# Patient Record
Sex: FEMALE | Race: WHITE | Employment: FULL TIME | URBAN - NONMETROPOLITAN AREA
[De-identification: names, ages, dates, MRNs, and addresses within clinical notes are randomized per-mention and may not be internally consistent; named-entity substitution may affect disease eponyms.]

---

## 2017-05-18 ENCOUNTER — TELEPHONE (OUTPATIENT)
Dept: NEUROLOGY | Age: 27
End: 2017-05-18

## 2017-05-18 ENCOUNTER — OFFICE VISIT (OUTPATIENT)
Dept: NEUROLOGY | Age: 27
End: 2017-05-18
Payer: COMMERCIAL

## 2017-05-18 VITALS
WEIGHT: 184 LBS | DIASTOLIC BLOOD PRESSURE: 68 MMHG | HEART RATE: 77 BPM | BODY MASS INDEX: 29.57 KG/M2 | OXYGEN SATURATION: 96 % | HEIGHT: 66 IN | SYSTOLIC BLOOD PRESSURE: 103 MMHG

## 2017-05-18 DIAGNOSIS — G44.031 INTRACTABLE EPISODIC PAROXYSMAL HEMICRANIA: Primary | ICD-10-CM

## 2017-05-18 DIAGNOSIS — R90.89 ABNORMAL BRAIN MRI: ICD-10-CM

## 2017-05-18 DIAGNOSIS — G43.019 INTRACTABLE MIGRAINE WITHOUT AURA AND WITHOUT STATUS MIGRAINOSUS: ICD-10-CM

## 2017-05-18 DIAGNOSIS — Z82.49 FAMILY HISTORY OF BRAIN ANEURYSM: ICD-10-CM

## 2017-05-18 PROCEDURE — 99203 OFFICE O/P NEW LOW 30 MIN: CPT | Performed by: PHYSICIAN ASSISTANT

## 2017-05-18 RX ORDER — HYDROXYZINE HYDROCHLORIDE 25 MG/1
TABLET, FILM COATED ORAL
Refills: 3 | COMMUNITY
Start: 2017-04-19 | End: 2019-09-19

## 2017-05-18 RX ORDER — SERTRALINE HYDROCHLORIDE 25 MG/1
TABLET, FILM COATED ORAL
Refills: 6 | COMMUNITY
Start: 2017-05-10 | End: 2017-10-09 | Stop reason: ALTCHOICE

## 2017-06-05 ENCOUNTER — HOSPITAL ENCOUNTER (OUTPATIENT)
Dept: MRI IMAGING | Age: 27
Discharge: HOME | End: 2017-06-05
Payer: COMMERCIAL

## 2017-06-05 DIAGNOSIS — R90.89 ABNORMAL BRAIN MRI: ICD-10-CM

## 2017-06-05 DIAGNOSIS — G44.031 EPISODIC PAROXYSMAL HEMICRANIA, INTRACTABLE: ICD-10-CM

## 2017-06-05 DIAGNOSIS — G43.019 INTRACTABLE MIGRAINE WITHOUT AURA AND WITHOUT STATUS MIGRAINOSUS: ICD-10-CM

## 2017-06-05 DIAGNOSIS — G44.031 INTRACTABLE EPISODIC PAROXYSMAL HEMICRANIA: ICD-10-CM

## 2017-06-05 DIAGNOSIS — G43.019 INTRACTABLE COMMON MIGRAINE: ICD-10-CM

## 2017-06-05 PROCEDURE — 70544 MR ANGIOGRAPHY HEAD W/O DYE: CPT

## 2017-06-05 PROCEDURE — 70547 MR ANGIOGRAPHY NECK W/O DYE: CPT

## 2017-06-09 ENCOUNTER — TELEPHONE (OUTPATIENT)
Dept: NEUROLOGY | Age: 27
End: 2017-06-09

## 2017-06-13 RX ORDER — TOPIRAMATE 100 MG/1
TABLET, FILM COATED ORAL
Qty: 30 TABLET | Refills: 3 | Status: SHIPPED | OUTPATIENT
Start: 2017-06-13 | End: 2018-01-09 | Stop reason: SDUPTHER

## 2017-06-13 RX ORDER — TOPIRAMATE 25 MG/1
TABLET ORAL
Qty: 42 TABLET | Refills: 0 | Status: SHIPPED | OUTPATIENT
Start: 2017-06-13 | End: 2017-10-09 | Stop reason: ALTCHOICE

## 2017-06-28 ENCOUNTER — TELEPHONE (OUTPATIENT)
Dept: NEUROLOGY | Age: 27
End: 2017-06-28

## 2017-08-02 ENCOUNTER — TELEPHONE (OUTPATIENT)
Dept: NEUROLOGY | Age: 27
End: 2017-08-02

## 2017-10-09 ENCOUNTER — OFFICE VISIT (OUTPATIENT)
Dept: NEUROLOGY | Age: 27
End: 2017-10-09
Payer: COMMERCIAL

## 2017-10-09 VITALS
SYSTOLIC BLOOD PRESSURE: 106 MMHG | OXYGEN SATURATION: 96 % | HEART RATE: 92 BPM | WEIGHT: 170 LBS | BODY MASS INDEX: 27.44 KG/M2 | DIASTOLIC BLOOD PRESSURE: 72 MMHG

## 2017-10-09 DIAGNOSIS — G43.019 INTRACTABLE MIGRAINE WITHOUT AURA AND WITHOUT STATUS MIGRAINOSUS: Primary | ICD-10-CM

## 2017-10-09 PROBLEM — Z82.49 FAMILY HISTORY OF BRAIN ANEURYSM: Status: RESOLVED | Noted: 2017-05-18 | Resolved: 2017-10-09

## 2017-10-09 PROBLEM — R90.89 ABNORMAL BRAIN MRI: Status: RESOLVED | Noted: 2017-05-18 | Resolved: 2017-10-09

## 2017-10-09 PROCEDURE — 99213 OFFICE O/P EST LOW 20 MIN: CPT | Performed by: PSYCHIATRY & NEUROLOGY

## 2017-10-09 RX ORDER — SUMATRIPTAN 100 MG/1
100 TABLET, FILM COATED ORAL
Qty: 9 TABLET | Refills: 3 | Status: SHIPPED | OUTPATIENT
Start: 2017-10-09 | End: 2018-05-03 | Stop reason: SDUPTHER

## 2017-10-09 NOTE — PROGRESS NOTES
multiple rotating MIP   reformatted images. Findings: The exam is markedly motion degraded. There is no occlusion of the anterior circulation. No gross high-grade   stenotic lesion of the proximal MCA or WILLIAM is evident. Likely artifact   generates a perceived mild/moderate stenosis in the right A1 segment   WILLIAM. Probable artifact also generates a perceived mild/moderate   stenosis of the right MCA origin. A patent anterior communicating   artery is present. Vertebrobasilar system is patent with no gross high-grade stenosis   allowing for artifact. Both proximal posterior cerebral arteries are   patent. There is a prominent right posterior communicating artery with   hypoplastic P1 right PCA. No patent left posterior communicating   artery is evident. No patent aneurysm is identified although sensitivity is lowered given   degree of motion degradation. Impression   Motion degraded exam. Perceived stenoses of the anterior circulation   are likely artifactual particularly given patient age. No definite   aneurysm. Signed by Dr Raul Akers on 6/5/2017 10:43 AM     Narrative   History:   18-year-old female evaluated for migraines and dizziness. Reference    None available. Technique   Routine nonenhanced cervical MRA with multiple rotating MIP   reformatted images. Findings:   Both common carotid arteries and extracranial internal carotid   arteries demonstrate normal flow signal characteristics. Both   vertebral arteries are unremarkable with antegrade flow. Impression   Negative cervical MRA. Signed by Dr Raul Akers on 6/5/2017 11:10 AM     MRI head [facility] 4/20/2017 was normal.    Assessment:       ICD-10-CM ICD-9-CM    1. Intractable migraine without aura and without status migrainosus G43.019 346.11    Improved with topamax    Plan:   1. Continue the Topamax 100 mg at bedtime  2. PRN Imitrex  3. FU in 6 months.     Electronically signed by Cecil Russell MD on 10/9/2017

## 2018-01-10 RX ORDER — TOPIRAMATE 100 MG/1
TABLET, FILM COATED ORAL
Qty: 30 TABLET | Refills: 3 | Status: SHIPPED | OUTPATIENT
Start: 2018-01-10 | End: 2018-05-03 | Stop reason: SDUPTHER

## 2018-05-03 ENCOUNTER — OFFICE VISIT (OUTPATIENT)
Dept: NEUROLOGY | Age: 28
End: 2018-05-03
Payer: COMMERCIAL

## 2018-05-03 VITALS
SYSTOLIC BLOOD PRESSURE: 106 MMHG | BODY MASS INDEX: 30.37 KG/M2 | OXYGEN SATURATION: 98 % | DIASTOLIC BLOOD PRESSURE: 65 MMHG | HEIGHT: 66 IN | WEIGHT: 189 LBS | HEART RATE: 80 BPM

## 2018-05-03 DIAGNOSIS — G44.041 CHRONIC PAROXYSMAL HEMICRANIA, INTRACTABLE: ICD-10-CM

## 2018-05-03 DIAGNOSIS — G43.019 INTRACTABLE MIGRAINE WITHOUT AURA AND WITHOUT STATUS MIGRAINOSUS: Primary | ICD-10-CM

## 2018-05-03 PROCEDURE — 99213 OFFICE O/P EST LOW 20 MIN: CPT | Performed by: PSYCHIATRY & NEUROLOGY

## 2018-05-03 RX ORDER — LAMOTRIGINE 25 MG/1
50 TABLET ORAL NIGHTLY
Refills: 3 | COMMUNITY
Start: 2018-04-14 | End: 2019-07-29

## 2018-05-03 RX ORDER — AMITRIPTYLINE HYDROCHLORIDE 25 MG/1
50 TABLET, FILM COATED ORAL NIGHTLY
Qty: 60 TABLET | Refills: 5 | Status: SHIPPED | OUTPATIENT
Start: 2018-05-03 | End: 2018-11-19 | Stop reason: CLARIF

## 2018-05-03 RX ORDER — NAPROXEN 500 MG/1
500 TABLET ORAL DAILY PRN
Refills: 2 | COMMUNITY
Start: 2018-02-22 | End: 2020-11-23

## 2018-05-03 RX ORDER — TOPIRAMATE 100 MG/1
TABLET, FILM COATED ORAL
Qty: 30 TABLET | Refills: 5 | Status: SHIPPED | OUTPATIENT
Start: 2018-05-03 | End: 2018-11-19 | Stop reason: SDUPTHER

## 2018-11-19 ENCOUNTER — OFFICE VISIT (OUTPATIENT)
Dept: NEUROLOGY | Age: 28
End: 2018-11-19
Payer: COMMERCIAL

## 2018-11-19 VITALS
HEIGHT: 66 IN | DIASTOLIC BLOOD PRESSURE: 68 MMHG | HEART RATE: 82 BPM | WEIGHT: 185 LBS | SYSTOLIC BLOOD PRESSURE: 103 MMHG | RESPIRATION RATE: 16 BRPM | BODY MASS INDEX: 29.73 KG/M2

## 2018-11-19 DIAGNOSIS — G44.221 CHRONIC TENSION-TYPE HEADACHE, INTRACTABLE: ICD-10-CM

## 2018-11-19 DIAGNOSIS — G44.041 CHRONIC PAROXYSMAL HEMICRANIA, INTRACTABLE: Primary | ICD-10-CM

## 2018-11-19 PROCEDURE — 99213 OFFICE O/P EST LOW 20 MIN: CPT | Performed by: PSYCHIATRY & NEUROLOGY

## 2018-11-19 RX ORDER — TOPIRAMATE 50 MG/1
150 TABLET, FILM COATED ORAL NIGHTLY
Qty: 90 TABLET | Refills: 5 | Status: SHIPPED | OUTPATIENT
Start: 2018-11-19 | End: 2019-12-16

## 2018-11-26 ENCOUNTER — TELEPHONE (OUTPATIENT)
Dept: NEUROLOGY | Age: 28
End: 2018-11-26

## 2019-05-11 ENCOUNTER — APPOINTMENT (OUTPATIENT)
Dept: GENERAL RADIOLOGY | Age: 29
End: 2019-05-11
Payer: COMMERCIAL

## 2019-05-11 ENCOUNTER — HOSPITAL ENCOUNTER (EMERGENCY)
Age: 29
Discharge: HOME | End: 2019-05-11
Payer: COMMERCIAL

## 2019-05-11 ENCOUNTER — APPOINTMENT (OUTPATIENT)
Dept: CT IMAGING | Age: 29
End: 2019-05-11
Payer: COMMERCIAL

## 2019-05-11 VITALS
OXYGEN SATURATION: 96 % | DIASTOLIC BLOOD PRESSURE: 70 MMHG | HEART RATE: 78 BPM | SYSTOLIC BLOOD PRESSURE: 114 MMHG | BODY MASS INDEX: 29.89 KG/M2 | RESPIRATION RATE: 18 BRPM | HEIGHT: 66 IN | TEMPERATURE: 97.7 F | WEIGHT: 186 LBS

## 2019-05-11 DIAGNOSIS — N83.202 CYST OF LEFT OVARY: ICD-10-CM

## 2019-05-11 DIAGNOSIS — R05.9 COUGH: ICD-10-CM

## 2019-05-11 DIAGNOSIS — R10.9 FLANK PAIN: ICD-10-CM

## 2019-05-11 DIAGNOSIS — J40 BRONCHITIS: Primary | ICD-10-CM

## 2019-05-11 LAB
ALBUMIN SERPL-MCNC: 4.3 G/DL (ref 3.5–5.2)
ALP BLD-CCNC: 83 U/L (ref 35–104)
ALT SERPL-CCNC: 18 U/L (ref 5–33)
ANION GAP SERPL CALCULATED.3IONS-SCNC: 12 MMOL/L (ref 7–19)
AST SERPL-CCNC: 17 U/L (ref 5–32)
BASOPHILS ABSOLUTE: 0 K/UL (ref 0–0.2)
BASOPHILS RELATIVE PERCENT: 0.3 % (ref 0–1)
BILIRUB SERPL-MCNC: <0.2 MG/DL (ref 0.2–1.2)
BILIRUBIN URINE: NEGATIVE
BLOOD, URINE: NEGATIVE
BUN BLDV-MCNC: 10 MG/DL (ref 6–20)
CALCIUM SERPL-MCNC: 9.4 MG/DL (ref 8.6–10)
CHLORIDE BLD-SCNC: 103 MMOL/L (ref 98–111)
CLARITY: ABNORMAL
CO2: 24 MMOL/L (ref 22–29)
COLOR: YELLOW
CREAT SERPL-MCNC: 0.6 MG/DL (ref 0.5–0.9)
EOSINOPHILS ABSOLUTE: 0.2 K/UL (ref 0–0.6)
EOSINOPHILS RELATIVE PERCENT: 2.3 % (ref 0–5)
GFR NON-AFRICAN AMERICAN: >60
GLUCOSE BLD-MCNC: 154 MG/DL (ref 74–109)
GLUCOSE URINE: NEGATIVE MG/DL
HCG QUALITATIVE: NEGATIVE
HCT VFR BLD CALC: 39.6 % (ref 37–47)
HEMOGLOBIN: 12.9 G/DL (ref 12–16)
KETONES, URINE: NEGATIVE MG/DL
LACTIC ACID: 1.6 MMOL/L (ref 0.5–1.9)
LEUKOCYTE ESTERASE, URINE: NEGATIVE
LIPASE: 26 U/L (ref 13–60)
LYMPHOCYTES ABSOLUTE: 1.4 K/UL (ref 1.1–4.5)
LYMPHOCYTES RELATIVE PERCENT: 15.1 % (ref 20–40)
MCH RBC QN AUTO: 29.9 PG (ref 27–31)
MCHC RBC AUTO-ENTMCNC: 32.6 G/DL (ref 33–37)
MCV RBC AUTO: 91.9 FL (ref 81–99)
MONOCYTES ABSOLUTE: 0.3 K/UL (ref 0–0.9)
MONOCYTES RELATIVE PERCENT: 3.4 % (ref 0–10)
NEUTROPHILS ABSOLUTE: 7.5 K/UL (ref 1.5–7.5)
NEUTROPHILS RELATIVE PERCENT: 78.6 % (ref 50–65)
NITRITE, URINE: NEGATIVE
PDW BLD-RTO: 13.6 % (ref 11.5–14.5)
PH UA: 6 (ref 5–8)
PLATELET # BLD: 220 K/UL (ref 130–400)
PMV BLD AUTO: 11.6 FL (ref 9.4–12.3)
POTASSIUM SERPL-SCNC: 3.8 MMOL/L (ref 3.5–5)
PROTEIN UA: NEGATIVE MG/DL
RBC # BLD: 4.31 M/UL (ref 4.2–5.4)
SODIUM BLD-SCNC: 139 MMOL/L (ref 136–145)
SPECIFIC GRAVITY UA: 1.03 (ref 1–1.03)
TOTAL PROTEIN: 7.3 G/DL (ref 6.6–8.7)
URINE REFLEX TO CULTURE: ABNORMAL
UROBILINOGEN, URINE: 0.2 E.U./DL
WBC # BLD: 9.5 K/UL (ref 4.8–10.8)

## 2019-05-11 PROCEDURE — 74150 CT ABDOMEN W/O CONTRAST: CPT

## 2019-05-11 PROCEDURE — 6360000002 HC RX W HCPCS: Performed by: NURSE PRACTITIONER

## 2019-05-11 PROCEDURE — 84703 CHORIONIC GONADOTROPIN ASSAY: CPT

## 2019-05-11 PROCEDURE — 81003 URINALYSIS AUTO W/O SCOPE: CPT

## 2019-05-11 PROCEDURE — 99284 EMERGENCY DEPT VISIT MOD MDM: CPT

## 2019-05-11 PROCEDURE — 96374 THER/PROPH/DIAG INJ IV PUSH: CPT

## 2019-05-11 PROCEDURE — 96375 TX/PRO/DX INJ NEW DRUG ADDON: CPT

## 2019-05-11 PROCEDURE — 85025 COMPLETE CBC W/AUTO DIFF WBC: CPT

## 2019-05-11 PROCEDURE — 80053 COMPREHEN METABOLIC PANEL: CPT

## 2019-05-11 PROCEDURE — 2580000003 HC RX 258: Performed by: NURSE PRACTITIONER

## 2019-05-11 PROCEDURE — 99285 EMERGENCY DEPT VISIT HI MDM: CPT | Performed by: NURSE PRACTITIONER

## 2019-05-11 PROCEDURE — 83605 ASSAY OF LACTIC ACID: CPT

## 2019-05-11 PROCEDURE — 83690 ASSAY OF LIPASE: CPT

## 2019-05-11 PROCEDURE — 36415 COLL VENOUS BLD VENIPUNCTURE: CPT

## 2019-05-11 PROCEDURE — 71046 X-RAY EXAM CHEST 2 VIEWS: CPT

## 2019-05-11 RX ORDER — PROMETHAZINE HYDROCHLORIDE 25 MG/ML
6.25 INJECTION, SOLUTION INTRAMUSCULAR; INTRAVENOUS ONCE
Status: COMPLETED | OUTPATIENT
Start: 2019-05-11 | End: 2019-05-11

## 2019-05-11 RX ORDER — 0.9 % SODIUM CHLORIDE 0.9 %
1000 INTRAVENOUS SOLUTION INTRAVENOUS ONCE
Status: COMPLETED | OUTPATIENT
Start: 2019-05-11 | End: 2019-05-11

## 2019-05-11 RX ORDER — DEXTROMETHORPHAN HYDROBROMIDE AND PROMETHAZINE HYDROCHLORIDE 15; 6.25 MG/5ML; MG/5ML
5 SYRUP ORAL 4 TIMES DAILY PRN
Qty: 118 ML | Refills: 0 | Status: SHIPPED | OUTPATIENT
Start: 2019-05-11 | End: 2019-05-18

## 2019-05-11 RX ADMIN — HYDROMORPHONE HYDROCHLORIDE 1 MG: 1 INJECTION, SOLUTION INTRAMUSCULAR; INTRAVENOUS; SUBCUTANEOUS at 09:14

## 2019-05-11 RX ADMIN — SODIUM CHLORIDE 1000 ML: 9 INJECTION, SOLUTION INTRAVENOUS at 09:14

## 2019-05-11 RX ADMIN — PROMETHAZINE HYDROCHLORIDE 6.25 MG: 25 INJECTION INTRAMUSCULAR; INTRAVENOUS at 09:14

## 2019-05-11 ASSESSMENT — ENCOUNTER SYMPTOMS
COUGH: 1
ABDOMINAL PAIN: 1
VOMITING: 1
NAUSEA: 1

## 2019-05-11 ASSESSMENT — PAIN DESCRIPTION - LOCATION: LOCATION: ABDOMEN

## 2019-05-11 ASSESSMENT — PAIN SCALES - GENERAL: PAINLEVEL_OUTOF10: 6

## 2019-05-11 NOTE — ED PROVIDER NOTES
file     Relationship status: Not on file    Intimate partner violence:     Fear of current or ex partner: Not on file     Emotionally abused: Not on file     Physically abused: Not on file     Forced sexual activity: Not on file   Other Topics Concern    Not on file   Social History Narrative    Not on file       SCREENINGS             PHYSICAL EXAM    (up to 7 for level 4, 8 or more for level 5)     ED Triage Vitals   BP Temp Temp src Pulse Resp SpO2 Height Weight   -- -- -- -- -- -- -- --     Vitals:    05/11/19 0816 05/11/19 0900 05/11/19 1300   BP: 122/71 138/74 114/70   Pulse: 86 84 78   Resp: 18 18 18   Temp: 97.7 °F (36.5 °C)     SpO2: 96% 96% 96%   Weight: 186 lb (84.4 kg)     Height: 5' 6\" (1.676 m)           Physical Exam   Constitutional: She is oriented to person, place, and time. She appears well-nourished. HENT:   Head: Normocephalic. Right Ear: External ear normal.   Left Ear: External ear normal.   Mouth/Throat: Oropharynx is clear and moist.   Eyes: Pupils are equal, round, and reactive to light. Conjunctivae and EOM are normal.   Neck: Normal range of motion. Cardiovascular: Normal rate, regular rhythm, normal heart sounds and intact distal pulses. Pulmonary/Chest: Effort normal and breath sounds normal.   Abdominal: Soft. Bowel sounds are normal. There is tenderness (mild ttp right flank/abdomen). Musculoskeletal: Normal range of motion. No lower extremity edema   Neurological: She is alert and oriented to person, place, and time. Skin: Skin is warm and dry. Vitals reviewed.       DIAGNOSTIC RESULTS     EKG: All EKG's are interpreted by the Emergency Department Physician who either signs or Co-signs this chart in the absence of acardiologist.        RADIOLOGY:   Non-plain film images such as CT, Ultrasound andMRI are read by the radiologist. Plain radiographic images are visualized and preliminarily interpreted by the emergency physician with the below findings:        Interpretation per the Radiologist below, if available at the time of this note:    CT KIDNEY WO CONTRAST   Final Result   1. Punctate nonobstructing intrarenal stones. No ureteral stones or   hydronephrosis. Right cortical renal scarring. 2. Follicles identified within the ovaries, the largest in the left   ovary measuring 2 cm. Signed by Dr Alfredito Pascal on 5/11/2019 9:25 AM      XR CHEST STANDARD (2 VW)   Final Result   1. No acute radiographic cardiopulmonary process. Signed by Dr Alfredito Pascal on 5/11/2019 9:03 AM            ED BEDSIDE ULTRASOUND:   Performed by ED Physician - none    LABS:  Labs Reviewed   CBC WITH AUTO DIFFERENTIAL - Abnormal; Notable for the following components:       Result Value    MCHC 32.6 (*)     Neutrophils % 78.6 (*)     Lymphocytes % 15.1 (*)     All other components within normal limits   COMPREHENSIVE METABOLIC PANEL - Abnormal; Notable for the following components:    Glucose 154 (*)     All other components within normal limits   URINE RT REFLEX TO CULTURE - Abnormal; Notable for the following components:    Clarity, UA CLOUDY (*)     All other components within normal limits   HCG, SERUM, QUALITATIVE   LIPASE   LACTIC ACID, PLASMA       All other labs were within normal range or not returned as of this dictation. RE-ASSESSMENT     Symptomatic improvement after fluids and medication. EMERGENCY DEPARTMENT COURSE and DIFFERENTIALDIAGNOSIS/MDM:   Vitals:    Vitals:    05/11/19 0816 05/11/19 0900 05/11/19 1300   BP: 122/71 138/74 114/70   Pulse: 86 84 78   Resp: 18 18 18   Temp: 97.7 °F (36.5 °C)     SpO2: 96% 96% 96%   Weight: 186 lb (84.4 kg)     Height: 5' 6\" (1.676 m)         MDM      CONSULTS:  None    PROCEDURES:  Unless otherwise notedbelow, none     Procedures    FINAL IMPRESSION     1. Bronchitis    2. Cough    3. Flank pain    4.  Cyst of left ovary          DISPOSITION/PLAN   DISPOSITION        [facility]

## 2019-06-18 ENCOUNTER — OFFICE VISIT (OUTPATIENT)
Dept: PSYCHIATRY | Age: 29
End: 2019-06-18
Payer: COMMERCIAL

## 2019-06-18 VITALS
OXYGEN SATURATION: 99 % | HEIGHT: 67 IN | WEIGHT: 173.9 LBS | SYSTOLIC BLOOD PRESSURE: 120 MMHG | BODY MASS INDEX: 27.29 KG/M2 | DIASTOLIC BLOOD PRESSURE: 76 MMHG | HEART RATE: 111 BPM

## 2019-06-18 DIAGNOSIS — F33.2 SEVERE EPISODE OF RECURRENT MAJOR DEPRESSIVE DISORDER, WITHOUT PSYCHOTIC FEATURES (HCC): Primary | ICD-10-CM

## 2019-06-18 PROCEDURE — 90791 PSYCH DIAGNOSTIC EVALUATION: CPT | Performed by: COUNSELOR
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
## 2019-06-24 ENCOUNTER — OFFICE VISIT (OUTPATIENT)
Dept: PSYCHIATRY | Age: 29
End: 2019-06-24
Payer: COMMERCIAL

## 2019-06-24 VITALS
HEART RATE: 113 BPM | BODY MASS INDEX: 27.8 KG/M2 | OXYGEN SATURATION: 100 % | SYSTOLIC BLOOD PRESSURE: 122 MMHG | WEIGHT: 173 LBS | DIASTOLIC BLOOD PRESSURE: 70 MMHG | HEIGHT: 66 IN

## 2019-06-24 DIAGNOSIS — F41.9 ANXIETY: ICD-10-CM

## 2019-06-24 DIAGNOSIS — F33.1 MAJOR DEPRESSIVE DISORDER, RECURRENT EPISODE, MODERATE (HCC): Primary | ICD-10-CM

## 2019-06-24 PROCEDURE — 90832 PSYTX W PT 30 MINUTES: CPT | Performed by: COUNSELOR

## 2019-07-08 ENCOUNTER — OFFICE VISIT (OUTPATIENT)
Dept: PSYCHIATRY | Age: 29
End: 2019-07-08
Payer: COMMERCIAL

## 2019-07-08 VITALS
HEART RATE: 68 BPM | OXYGEN SATURATION: 100 % | RESPIRATION RATE: 18 BRPM | TEMPERATURE: 98.3 F | DIASTOLIC BLOOD PRESSURE: 80 MMHG | SYSTOLIC BLOOD PRESSURE: 116 MMHG

## 2019-07-08 DIAGNOSIS — F41.9 ANXIETY: ICD-10-CM

## 2019-07-08 DIAGNOSIS — F33.1 MAJOR DEPRESSIVE DISORDER, RECURRENT EPISODE, MODERATE (HCC): Primary | ICD-10-CM

## 2019-07-08 PROCEDURE — 90832 PSYTX W PT 30 MINUTES: CPT | Performed by: COUNSELOR

## 2019-07-22 ENCOUNTER — OFFICE VISIT (OUTPATIENT)
Dept: PSYCHIATRY | Age: 29
End: 2019-07-22
Payer: COMMERCIAL

## 2019-07-22 DIAGNOSIS — F41.1 GENERALIZED ANXIETY DISORDER: ICD-10-CM

## 2019-07-22 DIAGNOSIS — F33.1 MAJOR DEPRESSIVE DISORDER, RECURRENT EPISODE, MODERATE (HCC): Primary | ICD-10-CM

## 2019-07-22 PROCEDURE — 90832 PSYTX W PT 30 MINUTES: CPT | Performed by: COUNSELOR

## 2019-07-29 ENCOUNTER — OFFICE VISIT (OUTPATIENT)
Dept: PSYCHIATRY | Age: 29
End: 2019-07-29
Payer: COMMERCIAL

## 2019-07-29 VITALS
OXYGEN SATURATION: 99 % | SYSTOLIC BLOOD PRESSURE: 108 MMHG | BODY MASS INDEX: 27.6 KG/M2 | WEIGHT: 171 LBS | HEART RATE: 98 BPM | DIASTOLIC BLOOD PRESSURE: 72 MMHG

## 2019-07-29 DIAGNOSIS — F31.81 MODERATE DEPRESSED BIPOLAR II DISORDER (HCC): Primary | ICD-10-CM

## 2019-07-29 DIAGNOSIS — F99 INSOMNIA DUE TO OTHER MENTAL DISORDER: ICD-10-CM

## 2019-07-29 DIAGNOSIS — F41.0 GENERALIZED ANXIETY DISORDER WITH PANIC ATTACKS: ICD-10-CM

## 2019-07-29 DIAGNOSIS — F41.1 GENERALIZED ANXIETY DISORDER WITH PANIC ATTACKS: ICD-10-CM

## 2019-07-29 DIAGNOSIS — F51.05 INSOMNIA DUE TO OTHER MENTAL DISORDER: ICD-10-CM

## 2019-07-29 PROCEDURE — 90792 PSYCH DIAG EVAL W/MED SRVCS: CPT | Performed by: NURSE PRACTITIONER

## 2019-07-29 RX ORDER — TRAZODONE HYDROCHLORIDE 50 MG/1
50 TABLET ORAL NIGHTLY
Qty: 30 TABLET | Refills: 0 | Status: SHIPPED | OUTPATIENT
Start: 2019-07-29 | End: 2019-09-19 | Stop reason: SDUPTHER

## 2019-07-29 RX ORDER — LAMOTRIGINE 25 MG/1
TABLET ORAL
Qty: 30 TABLET | Refills: 3 | Status: SHIPPED | OUTPATIENT
Start: 2019-07-29 | End: 2019-09-19

## 2019-07-29 RX ORDER — LAMOTRIGINE 25 MG/1
TABLET ORAL
Qty: 30 TABLET | Refills: 3 | Status: SHIPPED | OUTPATIENT
Start: 2019-07-29 | End: 2019-07-29

## 2019-07-29 RX ORDER — BUPROPION HYDROCHLORIDE 150 MG/1
150 TABLET ORAL EVERY MORNING
Qty: 30 TABLET | Refills: 3 | Status: SHIPPED | OUTPATIENT
Start: 2019-07-29 | End: 2019-07-29

## 2019-07-29 RX ORDER — BUPROPION HYDROCHLORIDE 150 MG/1
150 TABLET ORAL EVERY MORNING
Qty: 30 TABLET | Refills: 3 | Status: SHIPPED | OUTPATIENT
Start: 2019-07-29 | End: 2019-09-24 | Stop reason: SDUPTHER

## 2019-07-29 SDOH — HEALTH STABILITY: MENTAL HEALTH: HOW MANY STANDARD DRINKS CONTAINING ALCOHOL DO YOU HAVE ON A TYPICAL DAY?: 1 OR 2

## 2019-07-29 SDOH — HEALTH STABILITY: MENTAL HEALTH: HOW OFTEN DO YOU HAVE A DRINK CONTAINING ALCOHOL?: MONTHLY OR LESS

## 2019-07-29 NOTE — PROGRESS NOTES
ringing, sinus pressure, nosebleed, nasal discharge, sore throat, oral sores, tooth pain, bleeding gums, hoarse voice, neck pain)      Cardiovascular: (HTN, chest pain, palpitations, leg swelling, leg pain with walking)    Respiratory: (cough, wheezing, snoring, SOB with activity (dyspnea), SOB while lying flat (orthopnea), awakening with severe SOB (paroxysmal nocturnal dyspnea))    Gastrointestinal: (NVD, constipation, abdominal pain, bright red stools, black tarry stools, stool incontinence)     Genitourinary:  (pelvic pain, burning or frequency of urination, urinary urgency, blood in urine incomplete bladder emptying, urinary incontinence, STD; MEN: testicular pain or swelling, erectile dysfunction; WOMEN: LMP, heavy menstrual bleeding (menorrhagia), irregular periods, postmenopausal bleeding, menstrual pain (dymenorrhea, vaginal discharge)    Musculoskeletal: (bone pain/fracture, joint pain or swelling, musle pain)    Integumentary: (rashes, non-healing sores, itching, breast lumps, breast pain, nipple discharge, hair loss)    Neurologic: (HA, muscle weakness, paresthesias (numbness, coldness, crawling or prickling), memory loss, seizure, dizziness)    Psychiatric:  (anxiety, sadness, irritability/anger, insomnia, suicidality)    Endocrine: (heat or cold intolerance, excessive thirst (polydipsia), excessive hunger (polyphagia))    Immune/Allergic: (hives, seasonal or environmental allergies, HIV exposure)    Hematologic/Lymphatic: (lymph node enlargement, easy bleeding or bruising)      Prior to Admission medications    Medication Sig Start Date End Date Taking? Authorizing Provider   traZODone (DESYREL) 50 MG tablet Take 1 tablet by mouth nightly 7/29/19  Yes CHRIS Cordero NP   lamoTRIgine (LAMICTAL) 25 MG tablet Take 1 tab, 25mg, daily for 2 weeks, then increase to 50mg (2 tabs).  Stop taking if you develop a rash. 7/29/19  Yes CHRIS Cordero NP   buPROPion (WELLBUTRIN XL) 150 MG extended Dispense:  30 tablet     Refill:  3      No orders of the defined types were placed in this encounter. 9. Additional comments: Will restart Lamictal as she stopped it before because it wasn't effective but it also wasn't at an effective dose. Will also start Wellbutrin and Trazodone to address mood and sleep. Will follow up in about 6 weeks. 10.Over 50% of the total visit time of   60  minutes was spent on counseling and/or coordination of care of:          1. Moderate depressed bipolar II disorder ([facility] [address]    2. Generalized anxiety disorder with panic attacks    3.  Insomnia due to other mental disorder        RHIANNON Feliz-BC

## 2019-07-29 NOTE — PATIENT INSTRUCTIONS
In[employer]ated disclaims any warranty or liability for your use of this information. Patient Education        bupropion  Pronunciation:  byoo PRO pee on  Brand:  Aplenzin, Buproban, Forfivo XL, Wellbutrin SR, Wellbutrin XL, Zyban, Zyban Advantage Pack  What is the most important information I should know about bupropion? You should not take bupropion if you have seizures or an eating disorder, or if you have suddenly stopped using alcohol, seizure medication, or sedatives. If you take Wellbutrin for depression, do not also take Zyban to quit smoking. Do not use bupropion within 14 days before or 14 days after you have used an MAO inhibitor, such as isocarboxazid, linezolid, methylene blue injection, phenelzine, rasagiline, selegiline, or tranylcypromine. Some young people have thoughts about suicide when first taking an antidepressant. Stay alert to changes in your mood or symptoms. Report any new or worsening symptoms to your doctor. What is bupropion? Bupropion is an antidepressant medication used to treat major depressive disorder and seasonal affective disorder. The Zyban brand of bupropion is used to help people stop smoking by reducing cravings and other withdrawal effects. Bupropion may also be used for purposes not listed in this medication guide. What should I discuss with my healthcare provider before taking bupropion? You should not take bupropion if you are allergic to it, or if you have:  · a seizure disorder;  · an eating disorder such as anorexia or bulimia; or  · if you have suddenly stopped using alcohol, seizure medication, or a sedative such as Xanax, Valium, Fiorinal, Klonopin, and others). Do not use an MAO inhibitor within 14 days before or 14 days after you take bupropion. A dangerous drug interaction could occur. MAO inhibitors include isocarboxazid, linezolid, phenelzine, rasagiline, selegiline, and tranylcypromine.   Do not take bupropion to treat more than one condition at a

## 2019-08-05 ENCOUNTER — OFFICE VISIT (OUTPATIENT)
Dept: PSYCHIATRY | Age: 29
End: 2019-08-05
Payer: COMMERCIAL

## 2019-08-05 DIAGNOSIS — F31.81 MODERATE DEPRESSED BIPOLAR II DISORDER (HCC): Primary | ICD-10-CM

## 2019-08-05 DIAGNOSIS — F41.0 GENERALIZED ANXIETY DISORDER WITH PANIC ATTACKS: ICD-10-CM

## 2019-08-05 DIAGNOSIS — F41.1 GENERALIZED ANXIETY DISORDER WITH PANIC ATTACKS: ICD-10-CM

## 2019-08-05 PROCEDURE — 90832 PSYTX W PT 30 MINUTES: CPT | Performed by: COUNSELOR

## 2019-08-05 NOTE — PROGRESS NOTES
Therapy Progress Note  [facility]  8/5/2019  3:10 PM      Time spent with Patient: 25 minutes  This is patient's fifth  Therapy appointment. Reason for Consult:  depression, anxiety and stress  Referring Provider: No referring provider defined for this encounter. Edgar Jesus ,a 26 y.o. female, for initial evaluation visit. Pt provided informed consent for the behavioral health program. Discussed with patient model of service to include the limits of confidentiality (i.e. abuse reporting, suicide intervention, etc.) and short-term intervention focused approach. Discussed no show and late cancellation policy. Pt indicated understanding. S:  Pt has been diagnosed with Bipolar II D/O and she believes it \"makes sense\". She explains family history and how she believes her mother also has Bipolar II D/O and her father Bipolar 1 D/O. Pt requested info for child psychiatric services so therapist spoke to her about this as well. She reports she's been feeling slightly better since starting medications. Denies SI, HI and AVH at this time.     MSE:    Appearance    alert, cooperative; casually dressed  Appetite normal  Sleep disturbance is sleeping through the night with Trazodone  Fatigue No  Loss of pleasure No  Impulsive behavior No  Speech    normal rate and normal volume  Mood    Anxious  Depressed  Affect    normal affect  Thought Content    cognitive distortions  Thought Process    goal directed  Associations    logical connections  Insight    Good  Judgment    Intact  Orientation    oriented to person, place, time, and general circumstances  Memory    recent and remote memory intact  Attention/Concentration    intact  Morbid ideation No  Suicide Assessment    no suicidal ideation      History:  Social History     Socioeconomic History    Marital status:      Spouse name: Jair Hart Number of children: 2    Years of education: 12    Highest education level: Some college, no degree

## 2019-08-19 ENCOUNTER — OFFICE VISIT (OUTPATIENT)
Dept: PSYCHIATRY | Age: 29
End: 2019-08-19
Payer: COMMERCIAL

## 2019-08-19 DIAGNOSIS — F31.81 MODERATE DEPRESSED BIPOLAR II DISORDER (HCC): Primary | ICD-10-CM

## 2019-08-19 DIAGNOSIS — F41.0 GENERALIZED ANXIETY DISORDER WITH PANIC ATTACKS: ICD-10-CM

## 2019-08-19 DIAGNOSIS — F41.1 GENERALIZED ANXIETY DISORDER WITH PANIC ATTACKS: ICD-10-CM

## 2019-08-19 PROCEDURE — 90832 PSYTX W PT 30 MINUTES: CPT | Performed by: COUNSELOR

## 2019-08-19 NOTE — PROGRESS NOTES
when she was teenager, [employer] year in high school, (cut herself). She says that she called a friend to come and help her clean it up and she didn't report it or say anything to anyone else about it. History of cutting (has not done this in a couple of years, says she has a wonderful  who helps her with this). INPATIENT HOSPITALIZATIONS: denies        REHABILITATION:denies       Medications:   Current Outpatient Medications   Medication Sig Dispense Refill    traZODone (DESYREL) 50 MG tablet Take 1 tablet by mouth nightly 30 tablet 0    lamoTRIgine (LAMICTAL) 25 MG tablet Take 1 tab, 25mg, daily for 2 weeks, then increase to 50mg (2 tabs). Stop taking if you develop a rash. 30 tablet 3    buPROPion (WELLBUTRIN XL) 150 MG extended release tablet Take 1 tablet by mouth every morning 30 tablet 3    topiramate (TOPAMAX) 50 MG tablet Take 3 tablets by mouth nightly 1 q hs 90 tablet 5    naproxen (NAPROSYN) 500 MG tablet Take 500 mg by mouth daily as needed  2    hydrOXYzine (ATARAX) 25 MG tablet 50mg BID PRN  3     No current facility-administered medications for this visit.         Social History:   Social History     Socioeconomic History    Marital status:      Spouse name: Melinda Solano Number of children: 2    Years of education: 15    Highest education level: Some college, no degree   Occupational History    Occupation: Behavioral Tech    Social Needs    Financial resource strain: Not on file    Food insecurity:     Worry: Not on file     Inability: Not on file   [employer] needs:     Medical: Not on file     Non-medical: Not on file   Tobacco Use    Smoking status: Current Every Day Smoker     Packs/day: 0.50     Years: 6.00     Pack years: 3.00     Types: Cigarettes    Smokeless tobacco: Never Used   Substance and Sexual Activity    Alcohol use: Yes     Frequency: Monthly or less     Drinks per session: 1 or 2     Binge frequency: Less than monthly     Comment: rarely    Drug use: No    Sexual activity: Yes     Comment: tubal ligation   Lifestyle    Physical activity:     Days per week: Not on file     Minutes per session: Not on file    Stress: Not on file   Relationships    Social connections:     Talks on phone: Not on file     Gets together: Not on file     Attends Christian service: Not on file     Active member of club or organization: Not on file     Attends meetings of clubs or organizations: Not on file     Relationship status: Not on file    Intimate partner violence:     Fear of current or ex partner: Not on file     Emotionally abused: Not on file     Physically abused: Not on file     Forced sexual activity: Not on file   Other Topics Concern    Not on file   Social History Narrative    PREVIOUS MEDICATION TRIALS    Lamictal    Celexa (says it made her worse)            PREVIOUS PSYCHIATRIC HISTORY    She says that she has been treated for anxiety/depression off and on since age 20. FAMILY PSYCHIATRIC HISTORY    Father, PTSD (combat ), alcoholic, rages (has been sober for the last 6 yrs)    Paternal grandparents are alcoholics    Maternal grandfather, alcoholic    Brother, takes Paxil (doesn't know)    Son, ADHD, (possibly ODD, possibly Bipolar)    Two cousins, bipolar disorder    Mother, anxiety, depression, insomnia, mood swings        PREVIOUS SUICIDE ATTEMPTS: Once when she was teenager, [employer]ait [employer] year in high school, (cut herself). She says that she called a friend to come and help her clean it up and she didn't report it or say anything to anyone else about it. History of cutting (has not done this in a couple of years, says she has a wonderful  who helps her with this). INPATIENT HOSPITALIZATIONS: denies        REHABILITATION:denies       TOBACCO:   reports that she has been smoking cigarettes. She has a 3.00 pack-year smoking history. She has never used smokeless tobacco.  ETOH:   reports that she drinks alcohol.     Family History:   Family History   Problem Relation Age of Onset    Diabetes Maternal Aunt     Heart Failure Maternal Grandmother     Diabetes Paternal Grandfather     High Blood Pressure Paternal Grandfather        Diagnosis:         Diagnosis Date    Anxiety     Headache(784.0)     Headache, paroxysmal hemicrania, episodic     Migraine     Tension headache      Problems with primary support group, Problems related to the social environment, Economic problems and Other psychosocial and environmental problems    Plan:  1. Continue medication management  2. CBT to target depression/anxiety  3.  Discuss behavioral activation     Pt interventions:  Discussed and set plan for behavioral activation, Motivational Interviewing to determine importance and readiness for change, Discussed use of imagery, distractions, relaxation, mood management, communication training, questioning unhelpful thinking, problem-solving, and behavioral activation to manage pain, Supportive techniques, Emphasized self-care as important for managing overall health and CBT to target depression/anxiety      [facility]

## 2019-09-18 ENCOUNTER — OFFICE VISIT (OUTPATIENT)
Dept: PSYCHIATRY | Age: 29
End: 2019-09-18
Payer: COMMERCIAL

## 2019-09-18 DIAGNOSIS — F41.0 GENERALIZED ANXIETY DISORDER WITH PANIC ATTACKS: ICD-10-CM

## 2019-09-18 DIAGNOSIS — F41.1 GENERALIZED ANXIETY DISORDER WITH PANIC ATTACKS: ICD-10-CM

## 2019-09-18 DIAGNOSIS — F31.81 MILD DEPRESSED BIPOLAR II DISORDER (HCC): Primary | ICD-10-CM

## 2019-09-18 PROCEDURE — 90834 PSYTX W PT 45 MINUTES: CPT | Performed by: COUNSELOR

## 2019-09-18 NOTE — PROGRESS NOTES
Occupational History    Occupation: Behavioral Tech    Social Needs    Financial resource strain: Not on file    Food insecurity:     Worry: Not on file     Inability: Not on file   [employer] needs:     Medical: Not on file     Non-medical: Not on file   Tobacco Use    Smoking status: Current Every Day Smoker     Packs/day: 0.50     Years: 6.00     Pack years: 3.00     Types: Cigarettes    Smokeless tobacco: Never Used   Substance and Sexual Activity    Alcohol use: Yes     Frequency: Monthly or less     Drinks per session: 1 or 2     Binge frequency: Less than monthly     Comment: rarely    Drug use: No    Sexual activity: Yes     Comment: tubal ligation   Lifestyle    Physical activity:     Days per week: Not on file     Minutes per session: Not on file    Stress: Not on file   Relationships    Social connections:     Talks on phone: Not on file     Gets together: Not on file     Attends Christianity service: Not on file     Active member of club or organization: Not on file     Attends meetings of clubs or organizations: Not on file     Relationship status: Not on file    Intimate partner violence:     Fear of current or ex partner: Not on file     Emotionally abused: Not on file     Physically abused: Not on file     Forced sexual activity: Not on file   Other Topics Concern    Not on file   Social History Narrative    PREVIOUS MEDICATION TRIALS    Lamictal    Celexa (says it made her worse)            PREVIOUS PSYCHIATRIC HISTORY    She says that she has been treated for anxiety/depression off and on since age 20.              FAMILY PSYCHIATRIC HISTORY    Father, PTSD (combat ), alcoholic, rages (has been sober for the last 6 yrs)    Paternal grandparents are alcoholics    Maternal grandfather, alcoholic    Brother, takes Paxil (doesn't know)    Son, ADHD, (possibly ODD, possibly Bipolar)    Two cousins, bipolar disorder    Mother, anxiety, depression, insomnia, mood swings alcohol. Family History:   Family History   Problem Relation Age of Onset    Diabetes Maternal Aunt     Heart Failure Maternal Grandmother     Diabetes Paternal Grandfather     High Blood Pressure Paternal Grandfather        Diagnosis:        Diagnosis Date    Anxiety     Headache(784.0)     Headache, paroxysmal hemicrania, episodic     Migraine     Tension headache      Problems with primary support group, Housing problems and Other psychosocial and environmental problems    Plan:  1. Continue medication management  2. CBT to target depression/anxiety  3.  Therapeutic goals    Pt interventions:  Trained in strategies for increasing balanced thinking, Discussed and set plan for behavioral activation, Motivational Interviewing to determine importance and readiness for change, Supportive techniques, Emphasized self-care as important for managing overall health, CBT to target depression/anxiety and Identified maladaptive thoughts      [address]

## 2019-09-19 ENCOUNTER — OFFICE VISIT (OUTPATIENT)
Dept: PSYCHIATRY | Age: 29
End: 2019-09-19
Payer: COMMERCIAL

## 2019-09-19 VITALS
OXYGEN SATURATION: 95 % | BODY MASS INDEX: 25.71 KG/M2 | HEART RATE: 97 BPM | TEMPERATURE: 97.6 F | WEIGHT: 160 LBS | HEIGHT: 66 IN | DIASTOLIC BLOOD PRESSURE: 66 MMHG | SYSTOLIC BLOOD PRESSURE: 116 MMHG

## 2019-09-19 DIAGNOSIS — F31.81 BIPOLAR 2 DISORDER (HCC): Primary | ICD-10-CM

## 2019-09-19 DIAGNOSIS — F51.05 INSOMNIA DUE TO OTHER MENTAL DISORDER: ICD-10-CM

## 2019-09-19 DIAGNOSIS — F41.0 GENERALIZED ANXIETY DISORDER WITH PANIC ATTACKS: ICD-10-CM

## 2019-09-19 DIAGNOSIS — F99 INSOMNIA DUE TO OTHER MENTAL DISORDER: ICD-10-CM

## 2019-09-19 DIAGNOSIS — F41.1 GENERALIZED ANXIETY DISORDER WITH PANIC ATTACKS: ICD-10-CM

## 2019-09-19 PROCEDURE — 99213 OFFICE O/P EST LOW 20 MIN: CPT | Performed by: NURSE PRACTITIONER

## 2019-09-19 RX ORDER — TRAZODONE HYDROCHLORIDE 50 MG/1
50 TABLET ORAL NIGHTLY
Qty: 30 TABLET | Refills: 3 | Status: SHIPPED | OUTPATIENT
Start: 2019-09-19 | End: 2019-12-16

## 2019-09-19 RX ORDER — LAMOTRIGINE 25 MG/1
50 TABLET ORAL DAILY
Qty: 60 TABLET | Refills: 3 | Status: SHIPPED | OUTPATIENT
Start: 2019-09-19 | End: 2020-02-17

## 2019-09-19 NOTE — PROGRESS NOTES
a friend to come and help her clean it up and she didn't report it or say anything to anyone else about it. History of cutting (has not done this in a couple of years, says she has a wonderful  who helps her with this). INPATIENT HOSPITALIZATIONS: denies        REHABILITATION:denies       MSE:  Patient is  A & O x 4. Appearance:  well-appearing appropriately dressed for season and age. Cognition:  Recent memory intact , remote memory intact , good fund of knowledge, average  intelligence level. Speech:  normal  Language: Naming: intact;  Word Finding: intact  Conversation no evidence of delusions  Behavior:  Cooperative and Good eye contact  Mood: euthymic  Affect: congruent with mood  Thought Content: negative delusions, negative hallucinations, negative obsessions,  negativehomicidal and negative suicidal   Thought Process: linear, goal directed and coherent  Associations: logical connections  Attention Span and concentration: Normal   Judgement Insight:  normal and appropriate  Gait and Station:normal gait and station   Sleep: avg 6-7 hrs   Appetite: ok      Lab Results   Component Value Date     05/11/2019    K 3.8 05/11/2019     05/11/2019    CO2 24 05/11/2019    BUN 10 05/11/2019    CREATININE 0.6 05/11/2019    GLUCOSE 154 (H) 05/11/2019    CALCIUM 9.4 05/11/2019    PROT 7.3 05/11/2019    LABALBU 4.3 05/11/2019    BILITOT <0.2 05/11/2019    ALKPHOS 83 05/11/2019    AST 17 05/11/2019    ALT 18 05/11/2019    LABGLOM >60 05/11/2019     Lab Results   Component Value Date     05/11/2019    K 3.8 05/11/2019     05/11/2019    CO2 24 05/11/2019    BUN 10 05/11/2019    CREATININE 0.6 05/11/2019    GLUCOSE 154 05/11/2019    CALCIUM 9.4 05/11/2019      No results found for: CHOL  No results found for: TRIG  No results found for: HDL  No results found for: LDLCHOLESTEROL, LDLCALC  No results found for: LABVLDL, VLDL  No results found for: CHOLHDLRATIO  No results found for: LABA1C  No results found for: EAG  No results found for: TSHFT4, TSH  No results found for: VITD25    Assessments Administered:    PHQ9: 4, normal  HAM-A: 11, normal    Assessment:   1. Bipolar 2 disorder ([facility]    2. Insomnia due to other mental disorder    3. Generalized anxiety disorder with panic attacks        Plan:  Continue:  Lamictal, 50mg, daily  Trazodone, 50mg, nightly  Wellbutrin XL, 150mg, daily      Follow up: Return in about 3 months (around 12/19/2019). 1. The risks, benefits, side effects, indications, contraindications, and adverse effects of the medications have been discussed. Yes.  2. The pt has verbalized understanding and has capacity to give informed consent. 3. The Jorge Izquierdo report has been reviewed according to [facility] regulations. 4. Supportive therapy offered. 5. Follow up: Return in about 3 months (around 12/19/2019). 6. The patient has been advised to call with any problems. 7. Controlled substance Treatment Plan: none. 8. The above listed medications have been continued, modifications in meds and other orders/labs as follows:      Orders Placed This Encounter   Medications    lamoTRIgine (LAMICTAL) 25 MG tablet     Sig: Take 2 tablets by mouth daily     Dispense:  60 tablet     Refill:  3     Please discontinue all other scripts for this medication.  traZODone (DESYREL) 50 MG tablet     Sig: Take 1 tablet by mouth nightly     Dispense:  30 tablet     Refill:  3      No orders of the defined types were placed in this encounter. 9. Additional comments: Her medications seem to be working AEB her improved PQH9 and HAMA scores (last visit 16 and 37, respectively). She is very pleased with her sleep. She is doing well on 50mg of Lamictal so will not increase the dose at this time. Will follow up in about 3 months. 10.Over 50% of the total visit time of   20  minutes was spent on counseling and/or coordination of care of:                        1. Bipolar 2 disorder ([facility]    2.

## 2019-09-24 ENCOUNTER — TELEPHONE (OUTPATIENT)
Dept: PSYCHIATRY | Age: 29
End: 2019-09-24

## 2019-09-24 RX ORDER — BUPROPION HYDROCHLORIDE 150 MG/1
TABLET ORAL
Qty: 30 TABLET | Refills: 3 | Status: SHIPPED | OUTPATIENT
Start: 2019-09-24 | End: 2020-02-17 | Stop reason: SDUPTHER

## 2019-10-03 ENCOUNTER — OFFICE VISIT (OUTPATIENT)
Dept: NEUROLOGY | Age: 29
End: 2019-10-03
Payer: COMMERCIAL

## 2019-10-03 VITALS
SYSTOLIC BLOOD PRESSURE: 107 MMHG | BODY MASS INDEX: 25.55 KG/M2 | WEIGHT: 159 LBS | HEIGHT: 66 IN | HEART RATE: 84 BPM | DIASTOLIC BLOOD PRESSURE: 69 MMHG

## 2019-10-03 DIAGNOSIS — G43.009 MIGRAINE WITHOUT AURA AND WITHOUT STATUS MIGRAINOSUS, NOT INTRACTABLE: ICD-10-CM

## 2019-10-03 DIAGNOSIS — G44.221 CHRONIC TENSION-TYPE HEADACHE, INTRACTABLE: Primary | ICD-10-CM

## 2019-10-03 PROCEDURE — 99213 OFFICE O/P EST LOW 20 MIN: CPT | Performed by: PSYCHIATRY & NEUROLOGY

## 2019-10-03 RX ORDER — TOPIRAMATE 100 MG/1
100 TABLET, FILM COATED ORAL 2 TIMES DAILY
Qty: 60 TABLET | Refills: 5 | Status: SHIPPED | OUTPATIENT
Start: 2019-10-03 | End: 2021-01-15

## 2019-10-14 ENCOUNTER — OFFICE VISIT (OUTPATIENT)
Dept: PSYCHIATRY | Age: 29
End: 2019-10-14
Payer: COMMERCIAL

## 2019-10-14 DIAGNOSIS — F41.0 GENERALIZED ANXIETY DISORDER WITH PANIC ATTACKS: ICD-10-CM

## 2019-10-14 DIAGNOSIS — F31.81 BIPOLAR 2 DISORDER (HCC): Primary | ICD-10-CM

## 2019-10-14 DIAGNOSIS — F41.1 GENERALIZED ANXIETY DISORDER WITH PANIC ATTACKS: ICD-10-CM

## 2019-10-14 PROCEDURE — 90832 PSYTX W PT 30 MINUTES: CPT | Performed by: COUNSELOR

## 2019-10-31 ENCOUNTER — OFFICE VISIT (OUTPATIENT)
Dept: PSYCHIATRY | Age: 29
End: 2019-10-31
Payer: COMMERCIAL

## 2019-10-31 DIAGNOSIS — F41.0 GENERALIZED ANXIETY DISORDER WITH PANIC ATTACKS: ICD-10-CM

## 2019-10-31 DIAGNOSIS — F31.81 BIPOLAR 2 DISORDER (HCC): Primary | ICD-10-CM

## 2019-10-31 DIAGNOSIS — F41.1 GENERALIZED ANXIETY DISORDER WITH PANIC ATTACKS: ICD-10-CM

## 2019-10-31 PROCEDURE — 90834 PSYTX W PT 45 MINUTES: CPT | Performed by: COUNSELOR

## 2019-12-16 ENCOUNTER — OFFICE VISIT (OUTPATIENT)
Dept: PSYCHIATRY | Age: 29
End: 2019-12-16
Payer: COMMERCIAL

## 2019-12-16 VITALS
SYSTOLIC BLOOD PRESSURE: 117 MMHG | OXYGEN SATURATION: 96 % | BODY MASS INDEX: 23.3 KG/M2 | HEART RATE: 68 BPM | TEMPERATURE: 97.6 F | HEIGHT: 66 IN | DIASTOLIC BLOOD PRESSURE: 72 MMHG | WEIGHT: 145 LBS

## 2019-12-16 DIAGNOSIS — F41.1 GENERALIZED ANXIETY DISORDER WITH PANIC ATTACKS: ICD-10-CM

## 2019-12-16 DIAGNOSIS — F41.0 GENERALIZED ANXIETY DISORDER WITH PANIC ATTACKS: ICD-10-CM

## 2019-12-16 DIAGNOSIS — F51.05 INSOMNIA DUE TO OTHER MENTAL DISORDER: ICD-10-CM

## 2019-12-16 DIAGNOSIS — F99 INSOMNIA DUE TO OTHER MENTAL DISORDER: ICD-10-CM

## 2019-12-16 DIAGNOSIS — F31.81 BIPOLAR 2 DISORDER, MAJOR DEPRESSIVE EPISODE (HCC): Primary | ICD-10-CM

## 2019-12-16 PROCEDURE — 99214 OFFICE O/P EST MOD 30 MIN: CPT | Performed by: NURSE PRACTITIONER

## 2019-12-16 RX ORDER — TRAZODONE HYDROCHLORIDE 50 MG/1
TABLET ORAL
Qty: 60 TABLET | Refills: 3 | Status: SHIPPED | OUTPATIENT
Start: 2019-12-16 | End: 2021-03-03 | Stop reason: ALTCHOICE

## 2019-12-19 ENCOUNTER — TELEPHONE (OUTPATIENT)
Dept: FAMILY MEDICINE CLINIC | Age: 29
End: 2019-12-19

## 2020-01-20 ENCOUNTER — OFFICE VISIT (OUTPATIENT)
Dept: PSYCHIATRY | Age: 30
End: 2020-01-20
Payer: COMMERCIAL

## 2020-01-20 PROCEDURE — 90791 PSYCH DIAGNOSTIC EVALUATION: CPT | Performed by: SOCIAL WORKER
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
## 2020-02-10 ENCOUNTER — OFFICE VISIT (OUTPATIENT)
Dept: URGENT CARE | Age: 30
End: 2020-02-10
Payer: COMMERCIAL

## 2020-02-10 VITALS
WEIGHT: 146 LBS | HEART RATE: 93 BPM | OXYGEN SATURATION: 99 % | SYSTOLIC BLOOD PRESSURE: 118 MMHG | DIASTOLIC BLOOD PRESSURE: 70 MMHG | TEMPERATURE: 98.4 F | HEIGHT: 66 IN | BODY MASS INDEX: 23.46 KG/M2

## 2020-02-10 LAB
INFLUENZA A ANTIBODY: NEGATIVE
INFLUENZA B ANTIBODY: POSITIVE
S PYO AG THROAT QL: NORMAL

## 2020-02-10 PROCEDURE — 87804 INFLUENZA ASSAY W/OPTIC: CPT | Performed by: SPECIALIST

## 2020-02-10 PROCEDURE — 87880 STREP A ASSAY W/OPTIC: CPT | Performed by: SPECIALIST

## 2020-02-10 PROCEDURE — 99213 OFFICE O/P EST LOW 20 MIN: CPT | Performed by: SPECIALIST

## 2020-02-10 ASSESSMENT — ENCOUNTER SYMPTOMS
SORE THROAT: 1
SINUS PRESSURE: 1
COUGH: 1

## 2020-02-10 NOTE — PROGRESS NOTES
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 Lieutenant Hearn is c/o of Fever; Cough; and Chest Congestion        HPI:     Fever    This is a new problem. The current episode started in the past 7 days. The problem has been gradually worsening. Associated symptoms include congestion, coughing, muscle aches and a sore throat. She has tried acetaminophen for the symptoms. The treatment provided mild relief. Cough   Associated symptoms include a fever and a sore throat. Past Medical History:   Diagnosis Date    Anxiety     Headache(784.0)     Headache, paroxysmal hemicrania, episodic     Migraine     Tension headache      Past Surgical History:   Procedure Laterality Date    ADENOIDECTOMY      CHOLECYSTECTOMY, LAPAROSCOPIC  11/5/12    TUBAL LIGATION         Family History   Problem Relation Age of Onset    Diabetes Maternal Aunt     Heart Failure Maternal Grandmother     Diabetes Paternal Grandfather     High Blood Pressure Paternal Grandfather        Social History     Tobacco Use    Smoking status: Current Every Day Smoker     Packs/day: 1.00     Years: 6.00     Pack years: 6.00     Types: Cigarettes    Smokeless tobacco: Never Used   Substance Use Topics    Alcohol use: Yes     Frequency: Monthly or less     Drinks per session: 1 or 2     Binge frequency: Less than monthly     Comment: rarely      Current Outpatient Medications   Medication Sig Dispense Refill    traZODone (DESYREL) 50 MG tablet Take 1-2 tabs as needed for sleep at night.  60 tablet 3    topiramate (TOPAMAX) 100 MG tablet Take 1 tablet by mouth 2 times daily 60 tablet 5    buPROPion (WELLBUTRIN XL) 150 MG extended release tablet TAKE 1 TABLET BY MOUTH ONE TIME A DAY IN THE MORNING 30 tablet 3    lamoTRIgine

## 2020-02-10 NOTE — LETTER
[address]  [address] [place] [id number]  Phone: [phone]    CHRIS Castano NP        February 10, 2020     Patient: Lupe Altamirano   YOB: 1990   Date of Visit: 2/10/2020       To Whom it May Concern:    Lupe Altamirano was seen in my clinic on 2/10/2020. She may return to work on 02/11/2020. If you have any questions or concerns, please don't hesitate to call.     Sincerely,         CHRIS Castano NP

## 2020-02-17 ENCOUNTER — OFFICE VISIT (OUTPATIENT)
Dept: PSYCHIATRY | Age: 30
End: 2020-02-17
Payer: COMMERCIAL

## 2020-02-17 VITALS
OXYGEN SATURATION: 99 % | WEIGHT: 144 LBS | BODY MASS INDEX: 23.14 KG/M2 | HEART RATE: 103 BPM | HEIGHT: 66 IN | DIASTOLIC BLOOD PRESSURE: 70 MMHG | SYSTOLIC BLOOD PRESSURE: 103 MMHG

## 2020-02-17 PROCEDURE — 99214 OFFICE O/P EST MOD 30 MIN: CPT | Performed by: NURSE PRACTITIONER

## 2020-02-17 RX ORDER — LAMOTRIGINE 150 MG/1
150 TABLET ORAL DAILY
Qty: 30 TABLET | Refills: 3 | Status: SHIPPED | OUTPATIENT
Start: 2020-02-17 | End: 2020-06-01 | Stop reason: SDUPTHER

## 2020-02-17 RX ORDER — BUPROPION HYDROCHLORIDE 150 MG/1
TABLET ORAL
Qty: 30 TABLET | Refills: 3 | Status: SHIPPED | OUTPATIENT
Start: 2020-02-17 | End: 2020-06-01

## 2020-02-17 RX ORDER — HYDROXYZINE HYDROCHLORIDE 25 MG/1
TABLET, FILM COATED ORAL
Qty: 60 TABLET | Refills: 3 | Status: SHIPPED | OUTPATIENT
Start: 2020-02-17 | End: 2020-06-01

## 2020-02-17 NOTE — PROGRESS NOTES
2/17/2020 2:00 PM   Progress Note    IN:  4497  OUT: 1015      Kesha Loyd 1990      Chief Complaint   Patient presents with    Follow-up    Anxiety         Subjective:  Patient is a 31 y.o. female diagnosed with Bipolar 2 Disorder, BRETT, Insomnia and presents today for follow-up. Last seen in clinic on 9/19/19 and prior records were reviewed. Today patient states, \"The \"up\" on Lamictal was better, but the \"up\" on the Trazodone gave me a hangover. \" She reports that she went back on the dose on Trazodone. She says she is not sleeping. She says that her marriage is at a stalemate. She reports that some things are better and other things are not better. She says she continues to have panic attacks, in the last month about 5 (which is an increase from last visit). Patient reports side effects as follows: none. No evidence of EPS, no cogwheeling or abnormal motor movements. Absent  suicidal ideation. Reports compliance with medications as good .      Current Substance Use:  See history    BP: /70 (Site: Right Upper Arm, Position: Sitting, Cuff Size: Medium Adult)   Pulse 103   Ht 5' 6\" (1.676 m)   Wt 144 lb (65.3 kg)   SpO2 99%   Breastfeeding No   BMI 23.24 kg/m²       Review of Systems - 14 point review:  Negative except being treated for: migraines (since age 8), anxiety, depression, insomnia        Constitutional: (fevers, chills, night sweats, wt loss/gain, change in appetite, fatigue, somnolence)    HEENT: (ear pain or discharge, hearing loss, ear ringing, sinus pressure, nosebleed, nasal discharge, sore throat, oral sores, tooth pain, bleeding gums, hoarse voice, neck pain)      Cardiovascular: (HTN, chest pain, elevated cholesterol/lipids, palpitations, leg swelling, leg pain with walking)    Respiratory: (cough, wheezing, snoring, SOB with activity (dyspnea), SOB while lying flat (orthopnea), awakening with severe SOB (paroxysmal nocturnal dyspnea))    Gastrointestinal: (NVD, Brother, takes Paxil (doesn't know)    Son, ADHD, (possibly ODD, possibly Bipolar)    Two cousins, bipolar disorder    Mother, anxiety, depression, insomnia, mood swings        PREVIOUS SUICIDE ATTEMPTS: Once when she was teenager, [employer] year in high school, (cut herself). She says that she called a friend to come and help her clean it up and she didn't report it or say anything to anyone else about it. History of cutting (has not done this in a couple of years, says she has a wonderful  who helps her with this). INPATIENT HOSPITALIZATIONS: denies        REHABILITATION:denies       MSE:  Patient is  A & O x 4. Appearance:  well-appearing appropriately dressed for season and age. Cognition:  Recent memory intact , remote memory intact , good fund of knowledge, average  intelligence level. Speech:  normal  Language: Naming: intact;  Word Finding: intact  Conversation no evidence of delusions  Behavior:  Cooperative and Good eye contact  Mood: depressed  Affect: congruent with mood  Thought Content: negative delusions, negative hallucinations, negative obsessions,  negativehomicidal and negative suicidal   Thought Process: linear, goal directed and coherent  Associations: logical connections  Attention Span and concentration: Normal   Judgement Insight:  normal and appropriate  Gait and Station:normal gait and station   Sleep: avg 4-5 hrs  Appetite: normal      Lab Results   Component Value Date     05/11/2019    K 3.8 05/11/2019     05/11/2019    CO2 24 05/11/2019    BUN 10 05/11/2019    CREATININE 0.6 05/11/2019    GLUCOSE 154 (H) 05/11/2019    CALCIUM 9.4 05/11/2019    PROT 7.3 05/11/2019    LABALBU 4.3 05/11/2019    BILITOT <0.2 05/11/2019    ALKPHOS 83 05/11/2019    AST 17 05/11/2019    ALT 18 05/11/2019    LABGLOM >60 05/11/2019     Lab Results   Component Value Date     05/11/2019    K 3.8 05/11/2019     05/11/2019    CO2 24 05/11/2019    BUN 10 05/11/2019    CREATININE 0.6 05/11/2019    GLUCOSE 154 05/11/2019    CALCIUM 9.4 05/11/2019      No results found for: CHOL  No results found for: TRIG  No results found for: HDL  No results found for: LDLCHOLESTEROL, LDLCALC  No results found for: LABVLDL, VLDL  No results found for: CHOLHDLRATIO  No results found for: LABA1C  No results found for: EAG  No results found for: TSHFT4, TSH  No results found for: VITD25    Assessments Administered:    PHQ9: 6, mild  GAD7: 17, severe    Assessment:   1. Bipolar II disorder, mild, depressed, with anxious distress ([facility]    2. Generalized anxiety disorder with panic attacks    3. Insomnia due to other mental disorder        Plan:  Continue:  Trazodone, 50mg, take 1 tab nightly as needed for sleep  Wellbutrin XL, 150mg, daily    Increase:  Lamictal, 150mg, daily (in the morning)    Start:  Melatonin, 3mg, take 1-2 tabs   Hydroxyzine, 25mg, take 1-2 tabs up to 3 times daily as needed for anxiety/panic attacks    Continue therapy with CHAU Giang      Follow up: Return in about 2 months (around 4/17/2020). 1. The risks, benefits, side effects, indications, contraindications, and adverse effects of the medications have been discussed. Yes.  2. The pt has verbalized understanding and has capacity to give informed consent. 3. The Mickey Araujo report has been reviewed according to [facility] regulations. 4. Supportive therapy offered. 5. Follow up: Return in about 2 months (around 4/17/2020). 6. The patient has been advised to call with any problems. 7. Controlled substance Treatment Plan: none. 8. The above listed medications have been continued, modifications in meds and other orders/labs as follows:      Orders Placed This Encounter   Medications    lamoTRIgine (LAMICTAL) 150 MG tablet     Sig: Take 1 tablet by mouth daily     Dispense:  30 tablet     Refill:  3     Please discontinue all other scripts for this medication.     buPROPion (WELLBUTRIN XL) 150 MG extended release tablet     Sig: TAKE 1 TABLET BY

## 2020-02-18 ENCOUNTER — OFFICE VISIT (OUTPATIENT)
Dept: PSYCHIATRY | Age: 30
End: 2020-02-18
Payer: COMMERCIAL

## 2020-02-18 PROCEDURE — 90837 PSYTX W PT 60 MINUTES: CPT | Performed by: SOCIAL WORKER

## 2020-02-18 NOTE — PROGRESS NOTES
education level: Some college, no degree   Occupational History    Occupation: Behavioral Tech    Social Needs    Financial resource strain: Not on file    Food insecurity:     Worry: Not on file     Inability: Not on file   [employer] needs:     Medical: Not on file     Non-medical: Not on file   Tobacco Use    Smoking status: Current Every Day Smoker     Packs/day: 1.00     Years: 6.00     Pack years: 6.00     Types: Cigarettes    Smokeless tobacco: Never Used   Substance and Sexual Activity    Alcohol use: Yes     Frequency: Monthly or less     Drinks per session: 1 or 2     Binge frequency: Less than monthly     Comment: rarely    Drug use: No    Sexual activity: Yes     Comment: tubal ligation   Lifestyle    Physical activity:     Days per week: Not on file     Minutes per session: Not on file    Stress: Not on file   Relationships    Social connections:     Talks on phone: Not on file     Gets together: Not on file     Attends Jehovah's witness service: Not on file     Active member of club or organization: Not on file     Attends meetings of clubs or organizations: Not on file     Relationship status: Not on file    Intimate partner violence:     Fear of current or ex partner: Not on file     Emotionally abused: Not on file     Physically abused: Not on file     Forced sexual activity: Not on file   Other Topics Concern    Not on file   Social History Narrative    PREVIOUS MEDICATION TRIALS    Lamictal    Celexa (says it made her worse)            PREVIOUS PSYCHIATRIC HISTORY    She says that she has been treated for anxiety/depression off and on since age 20.              FAMILY PSYCHIATRIC HISTORY    Father, PTSD (combat ), alcoholic, rages (has been sober for the last 6 yrs)    Paternal grandparents are alcoholics    Maternal grandfather, alcoholic    Brother, takes Paxil (doesn't know)    Son, ADHD, (possibly ODD, possibly Bipolar)    Two cousins, bipolar disorder    Mother, anxiety, depression, insomnia, mood swings        PREVIOUS SUICIDE ATTEMPTS: Once when she was teenager, [employer] year in high school, (cut herself). She says that she called a friend to come and help her clean it up and she didn't report it or say anything to anyone else about it. History of cutting (has not done this in a couple of years, says she has a wonderful  who helps her with this). INPATIENT HOSPITALIZATIONS: denies        REHABILITATION:denies       Medications:   Current Outpatient Medications   Medication Sig Dispense Refill    lamoTRIgine (LAMICTAL) 150 MG tablet Take 1 tablet by mouth daily 30 tablet 3    buPROPion (WELLBUTRIN XL) 150 MG extended release tablet TAKE 1 TABLET BY MOUTH ONE TIME A DAY IN THE MORNING 30 tablet 3    hydrOXYzine (ATARAX) 25 MG tablet Take 1-2 tabs up to 3 times daily as needed for anxiety/panic attacks. 60 tablet 3    traZODone (DESYREL) 50 MG tablet Take 1-2 tabs as needed for sleep at night. 60 tablet 3    topiramate (TOPAMAX) 100 MG tablet Take 1 tablet by mouth 2 times daily 60 tablet 5    naproxen (NAPROSYN) 500 MG tablet Take 500 mg by mouth daily as needed  2     No current facility-administered medications for this visit.         Social History:   Social History     Socioeconomic History    Marital status:      Spouse name: Rose Mary Cisse Number of children: 2    Years of education: 15    Highest education level: Some college, no degree   Occupational History    Occupation: Behavioral Tech    Social Needs    Financial resource strain: Not on file    Food insecurity:     Worry: Not on file     Inability: Not on file   [employer] needs:     Medical: Not on file     Non-medical: Not on file   Tobacco Use    Smoking status: Current Every Day Smoker     Packs/day: 1.00     Years: 6.00     Pack years: 6.00     Types: Cigarettes    Smokeless tobacco: Never Used   Substance and Sexual Activity    Alcohol use: Yes     Frequency: Monthly or less Drinks per session: 1 or 2     Binge frequency: Less than monthly     Comment: rarely    Drug use: No    Sexual activity: Yes     Comment: tubal ligation   Lifestyle    Physical activity:     Days per week: Not on file     Minutes per session: Not on file    Stress: Not on file   Relationships    Social connections:     Talks on phone: Not on file     Gets together: Not on file     Attends Taoism service: Not on file     Active member of club or organization: Not on file     Attends meetings of clubs or organizations: Not on file     Relationship status: Not on file    Intimate partner violence:     Fear of current or ex partner: Not on file     Emotionally abused: Not on file     Physically abused: Not on file     Forced sexual activity: Not on file   Other Topics Concern    Not on file   Social History Narrative    PREVIOUS MEDICATION TRIALS    Lamictal    Celexa (says it made her worse)            PREVIOUS PSYCHIATRIC HISTORY    She says that she has been treated for anxiety/depression off and on since age 20. FAMILY PSYCHIATRIC HISTORY    Father, PTSD (combat ), alcoholic, rages (has been sober for the last 6 yrs)    Paternal grandparents are alcoholics    Maternal grandfather, alcoholic    Brother, takes Paxil (doesn't know)    Son, ADHD, (possibly ODD, possibly Bipolar)    Two cousins, bipolar disorder    Mother, anxiety, depression, insomnia, mood swings        PREVIOUS SUICIDE ATTEMPTS: Once when she was teenager, [employer] year in high school, (cut herself). She says that she called a friend to come and help her clean it up and she didn't report it or say anything to anyone else about it. History of cutting (has not done this in a couple of years, says she has a wonderful  who helps her with this). INPATIENT HOSPITALIZATIONS: denies        REHABILITATION:denies       TOBACCO:   reports that she has been smoking cigarettes. She has a 6.00 pack-year smoking history.  She has

## 2020-03-16 ENCOUNTER — OFFICE VISIT (OUTPATIENT)
Dept: PSYCHIATRY | Age: 30
End: 2020-03-16
Payer: COMMERCIAL

## 2020-03-16 PROCEDURE — 90837 PSYTX W PT 60 MINUTES: CPT | Performed by: SOCIAL WORKER

## 2020-03-16 NOTE — PROGRESS NOTES
Therapy Progress Note  Ramon Ramos MSW, LCSW  3/16/2020  9:55 AM  11:00 AM      Time spent with Patient: 65 minutes  This is patient's third  Therapy appointment. Reason for Consult:  depression and anxiety  Referring Provider: No referring provider defined for this encounter. Bakari Kirk ,a 31 y.o. female, for initial evaluation visit. Pt provided informed consent for the behavioral health program. Discussed with patient model of service to include the limits of confidentiality (i.e. abuse reporting, suicide intervention, etc.) and short-term intervention focused approach. Discussed no show and late cancellation policy. Pt indicated understanding. S:  Pt discussed the Coronia Virus and how people are thinking and behaving. Pt reported her 11y/o \"raged\" and  tried to throw his TV out the window, after pt disciplined him for not sharing a erasmo system. Pt reported she punched a wall and went to sit down and her  was talking to their son stated, \"I have to deal with this\" pointing at pt. Pt reported this hurt her feelings. Pt shared multiple fights and situations. Discussed utilizing \"I\" statements and connecting with each other on an emotional level. Pt verbalized she is focused on problem solving and misses identifying his vulnerabilities. Pt reported she has been with her  since she was 16 y/o and believes she has tried everything to keep their marriage going while he continuously talks about her  him. Discussed bringing him into the sessions, however pt verbalized the session would be focused on her and not her . Pt denies Suicidal Ideations, Homicidal Ideation, Auditory Hallucinations, Visual Hallucinations, Tactical Hallucinations.         MSE:    Appearance    alert, cooperative, moderate distress  Appetite normal  Sleep disturbance Yes  Fatigue Yes  Loss of pleasure Yes  Impulsive behavior Yes  Speech    normal rate and normal volume  Mood

## 2020-04-06 ENCOUNTER — TELEMEDICINE (OUTPATIENT)
Dept: NEUROLOGY | Age: 30
End: 2020-04-06
Payer: COMMERCIAL

## 2020-04-06 PROCEDURE — 99213 OFFICE O/P EST LOW 20 MIN: CPT | Performed by: PSYCHIATRY & NEUROLOGY

## 2020-04-06 RX ORDER — ERENUMAB-AOOE 140 MG/ML
140 INJECTION, SOLUTION SUBCUTANEOUS
Qty: 3 PEN | Refills: 1 | Status: SHIPPED | OUTPATIENT
Start: 2020-04-06 | End: 2021-05-04

## 2020-04-06 NOTE — PATIENT INSTRUCTIONS
INSTRUCTIONS:  1. Start Aimovig 140 mg SQ every month. Check out www.aimovig. com for 2 month free sample and for access card for $5 copay card. 2. Continue Topamax for now. If Aimovig helps, will consider tapering the Topamax.

## 2020-04-06 NOTE — PROGRESS NOTES
G44.221    2. Migraine without aura and without status migrainosus, not intractable G43.009    She had been doing reasonably well on Topamax. Unfortunately she has significant head pains lately. Plan:   1. Start Aimovig 140 mg SQ monthly  2. For now continue Topamax but consider tapering if the Aimovig helps. 3. FU in 6 months    Pursuant to the emergency declaration under the [facility] waiver authority and the [employer] Act, this Virtual  Visit was conducted, with patient's consent, to reduce the patient's risk of exposure to COVID-19 and provide continuity of care for an established patient. Services were provided through a video synchronous discussion virtually to substitute for in-person clinic visit.      Electronically signed by Makayla Gan MD on 4/6/20

## 2020-04-08 ENCOUNTER — TELEPHONE (OUTPATIENT)
Dept: PSYCHIATRY | Age: 30
End: 2020-04-08

## 2020-05-20 ENCOUNTER — OFFICE VISIT (OUTPATIENT)
Dept: URGENT CARE | Age: 30
End: 2020-05-20
Payer: COMMERCIAL

## 2020-05-20 VITALS
WEIGHT: 146.6 LBS | BODY MASS INDEX: 23.66 KG/M2 | DIASTOLIC BLOOD PRESSURE: 70 MMHG | RESPIRATION RATE: 18 BRPM | SYSTOLIC BLOOD PRESSURE: 120 MMHG | HEART RATE: 97 BPM | TEMPERATURE: 98.8 F | OXYGEN SATURATION: 97 %

## 2020-05-20 PROCEDURE — 99213 OFFICE O/P EST LOW 20 MIN: CPT | Performed by: NURSE PRACTITIONER

## 2020-05-20 RX ORDER — AMOXICILLIN AND CLAVULANATE POTASSIUM 875; 125 MG/1; MG/1
1 TABLET, FILM COATED ORAL 2 TIMES DAILY
Qty: 20 TABLET | Refills: 0 | Status: SHIPPED | OUTPATIENT
Start: 2020-05-20 | End: 2020-05-30

## 2020-05-20 ASSESSMENT — ENCOUNTER SYMPTOMS: FACIAL SWELLING: 1

## 2020-05-20 NOTE — PATIENT INSTRUCTIONS
you feel better. You need to take the full course of antibiotics. To prevent tooth abscess  · Brush and floss every day. Have regular dental checkups. · Eat a healthy diet. Avoid sugary foods and drinks. · Do not smoke or vape with nicotine. And don't use spit tobacco. Tobacco and nicotine slow your ability to heal. They increase your risk for gum disease and cancer of the mouth and throat. If you need help quitting, talk to your doctor about stop-smoking programs and medicines. These can increase your chances of quitting for good. When should you call for help? Call 911 anytime you think you may need emergency care. For example, call if:    · You have trouble breathing.    Call your doctor now or seek immediate medical care if:    · You have new or worse symptoms of infection, such as:  ? Increased pain, swelling, warmth, or redness. ? Red streaks leading from the area. ? Pus draining from the area. ? A fever.    Watch closely for changes in your health, and be sure to contact your doctor if:    · You do not get better as expected. Where can you learn more? Go to https://[employer]. org and sign in to your [employer] account. Enter C337 in the [facility] box to learn more about \"Abscessed Tooth: Care Instructions. \"     If you do not have an account, please click on the \"Sign Up Now\" link. Current as of: July 28, 2019Content Version: 12.4  © 0475-7144 Healthwise, Incorporated. Care instructions adapted under license by [facility] ([facility]). If you have questions about a medical condition or this instruction, always ask your healthcare professional. [place] [address] any warranty or liability for your use of this information.

## 2020-05-20 NOTE — PROGRESS NOTES
BY MOUTH ONE TIME A DAY IN THE MORNING 30 tablet 3    hydrOXYzine (ATARAX) 25 MG tablet Take 1-2 tabs up to 3 times daily as needed for anxiety/panic attacks. 60 tablet 3    traZODone (DESYREL) 50 MG tablet Take 1-2 tabs as needed for sleep at night. 60 tablet 3    topiramate (TOPAMAX) 100 MG tablet Take 1 tablet by mouth 2 times daily 60 tablet 5    naproxen (NAPROSYN) 500 MG tablet Take 500 mg by mouth daily as needed  2     No current facility-administered medications for this visit. Allergies   Allergen Reactions    Morphine Other (See Comments)     Produces headaches for about 4-5 days. Health Maintenance   Topic Date Due    Varicella vaccine (1 of 2 - 2-dose childhood series) 01/29/1990    Pneumococcal 0-64 years Vaccine (1 of 1 - PPSV23) 01/29/1995    HIV screen  01/29/2004    DTaP/Tdap/Td vaccine (1 - Tdap) 01/29/2008    Cervical cancer screen  01/29/2010    Flu vaccine (Season Ended) 09/01/2020    Hepatitis A vaccine  Aged Out    Hepatitis B vaccine  Aged Out    Hib vaccine  Aged Out    Meningococcal (ACWY) vaccine  Aged Out       Subjective:     Review of Systems   Constitutional: Negative for activity change, appetite change, chills, fatigue and fever. HENT: Positive for dental problem and facial swelling. Objective:     Physical Exam  Vitals signs and nursing note reviewed. Constitutional:       Appearance: She is well-developed. HENT:      Head: Normocephalic and atraumatic. Right Ear: Hearing normal.      Left Ear: Hearing normal.      Mouth/Throat:      Lips: Pink. Mouth: Mucous membranes are moist.      Dentition: Abnormal dentition. Dental tenderness, gingival swelling, dental caries and dental abscesses present. Eyes:      General: Lids are normal.      Conjunctiva/sclera: Conjunctivae normal.      Pupils: Pupils are equal, round, and reactive to light. Cardiovascular:      Rate and Rhythm: Normal rate and regular rhythm.       Heart sounds: Normal heart sounds. Pulmonary:      Effort: Pulmonary effort is normal.      Breath sounds: Normal breath sounds. Skin:     General: Skin is warm and dry. Neurological:      Mental Status: She is alert and oriented to person, place, and time. Psychiatric:         Speech: Speech normal.         Behavior: Behavior normal.         Thought Content: Thought content normal.       /70   Pulse 97   Temp 98.8 °F (37.1 °C) (Temporal)   Resp 18   Wt 146 lb 9.6 oz (66.5 kg)   SpO2 97%   BMI 23.66 kg/m²     Assessment:       Diagnosis Orders   1. Dental abscess  amoxicillin-clavulanate (AUGMENTIN) 875-125 MG per tablet       Plan:    No orders of the defined types were placed in this encounter. No follow-ups on file. Orders Placed This Encounter   Medications    amoxicillin-clavulanate (AUGMENTIN) 875-125 MG per tablet     Sig: Take 1 tablet by mouth 2 times daily for 10 days     Dispense:  20 tablet     Refill:  0      Pt is aware that at any point her condition worsens or she develops any systemic symptoms she is to go to ER. Patient given educational materials- see patient instructions. Discussed use, benefit, and side effects of prescribedmedications. All patient questions answered. Pt voiced understanding. Reviewedhealth maintenance. Instructed to continue current medications, diet and exercise. Patient agreed with treatment plan. Follow up as directed. Patient Instructions       Patient Education        Abscessed Tooth: Care Instructions  Your Care Instructions    An abscessed tooth is a tooth that has a pocket of pus in the tissues around it. Pus forms when the body tries to fight an infection caused by bacteria. If the pus cannot drain, it forms an abscess. An abscessed tooth can cause red, swollen gums and throbbing pain, especially when you chew. You may have a bad taste in your mouth and a fever, and your jaw may swell.   Damage to the tooth, untreated tooth decay, or gum disease can cause an abscessed tooth. An abscessed tooth needs to be treated by a dental professional right away. If it is not treated, the infection could spread to other parts of your body. Your dentist will give you antibiotics to stop the infection. He or she may make a hole in the tooth or cut open (charlette) the abscess inside your mouth so that the infection can drain, which should relieve your pain. You may need to have a root canal treatment, which tries to save your tooth by taking out the infected pulp and replacing it with a healing medicine and/or a filling. If these treatments do not work, your tooth may have to be removed. Follow-up care is a key part of your treatment and safety. Be sure to make and go to all appointments, and call your doctor if you are having problems. It's also a good idea to know your test results and keep a list of the medicines you take. How can you care for yourself at home? · Reduce pain and swelling in your face and jaw by putting ice or a cold pack on the outside of your cheek. Do this for 10 to 20 minutes at a time. Put a thin cloth between the ice and your skin. · Take pain medicines exactly as directed. ? If the doctor gave you a prescription medicine for pain, take it as prescribed. ? If you are not taking a prescription pain medicine, ask your doctor if you can take an over-the-counter medicine. · Take antibiotics as directed. Do not stop taking them just because you feel better. You need to take the full course of antibiotics. To prevent tooth abscess  · Brush and floss every day. Have regular dental checkups. · Eat a healthy diet. Avoid sugary foods and drinks. · Do not smoke or vape with nicotine. And don't use spit tobacco. Tobacco and nicotine slow your ability to heal. They increase your risk for gum disease and cancer of the mouth and throat. If you need help quitting, talk to your doctor about stop-smoking programs and medicines.  These can increase your

## 2020-06-01 ENCOUNTER — OFFICE VISIT (OUTPATIENT)
Dept: PSYCHIATRY | Age: 30
End: 2020-06-01
Payer: COMMERCIAL

## 2020-06-01 VITALS
TEMPERATURE: 97.8 F | HEART RATE: 96 BPM | BODY MASS INDEX: 22.98 KG/M2 | OXYGEN SATURATION: 100 % | DIASTOLIC BLOOD PRESSURE: 72 MMHG | WEIGHT: 143 LBS | SYSTOLIC BLOOD PRESSURE: 106 MMHG | HEIGHT: 66 IN

## 2020-06-01 PROCEDURE — 99214 OFFICE O/P EST MOD 30 MIN: CPT | Performed by: NURSE PRACTITIONER

## 2020-06-01 RX ORDER — HYDROXYZINE HYDROCHLORIDE 10 MG/1
TABLET, FILM COATED ORAL
Qty: 90 TABLET | Refills: 3 | Status: SHIPPED | OUTPATIENT
Start: 2020-06-01 | End: 2021-03-03 | Stop reason: ALTCHOICE

## 2020-06-01 RX ORDER — LAMOTRIGINE 150 MG/1
150 TABLET ORAL DAILY
Qty: 90 TABLET | Refills: 1 | Status: SHIPPED | OUTPATIENT
Start: 2020-06-01 | End: 2022-08-04 | Stop reason: SDUPTHER

## 2020-06-01 RX ORDER — BUPROPION HYDROCHLORIDE 150 MG/1
TABLET ORAL
Qty: 90 TABLET | Refills: 1 | Status: SHIPPED | OUTPATIENT
Start: 2020-06-01 | End: 2022-08-04 | Stop reason: SDUPTHER

## 2020-06-01 RX ORDER — QUETIAPINE FUMARATE 25 MG/1
25 TABLET, FILM COATED ORAL DAILY
Qty: 30 TABLET | Refills: 3 | Status: SHIPPED | OUTPATIENT
Start: 2020-06-01 | End: 2021-02-26

## 2020-06-01 NOTE — PROGRESS NOTES
6/1/2020 9:38 AM   Progress Note    IN:  9015  OUT: 56      Kesha Loyd 1990      Chief Complaint   Patient presents with    Follow-up    Anxiety    Depression    Insomnia    Panic Attack         Subjective:  Patient is a 31 y.o. female diagnosed with Bipolar 2 Disorder, BRETT, Insomnia and presents today for follow-up. Last seen in clinic on 2/17/20 and prior records were reviewed. Today patient states, \"The Lamictal is working and the Wellbutrin, but I am not getting enough sleep. \" She reports that more than 50mg of Trazodone gives her a hangover effect. She says that Hydroxyzine with Trazodone. She reports that she is having problems getting to sleep because her mind won't shut off. She says that she has had about 3 panic attacks in the last month. She says that she has a lot of stress with her  and that this is causing a lot of her stress. She says that she hasn't had one at work \"in a while. \"       Patient reports side effects as follows: none. No evidence of EPS, no cogwheeling or abnormal motor movements. Absent  suicidal ideation. Reports compliance with medications as good .      Current Substance Use:  See history    BP: /72 (Site: Right Upper Arm, Position: Sitting, Cuff Size: Medium Adult)   Pulse 96   Temp 97.8 °F (36.6 °C)   Ht 5' 6\" (1.676 m)   Wt 143 lb (64.9 kg)   SpO2 100%   BMI 23.08 kg/m²       Review of Systems - 14 point review:  Negative except being treated for: migraines (since age 8), anxiety, depression, insomnia        Constitutional: (fevers, chills, night sweats, wt loss/gain, change in appetite, fatigue, somnolence)    HEENT: (ear pain or discharge, hearing loss, ear ringing, sinus pressure, nosebleed, nasal discharge, sore throat, oral sores, tooth pain, bleeding gums, hoarse voice, neck pain)      Cardiovascular: (HTN, chest pain, elevated cholesterol/lipids, palpitations, leg swelling, leg pain with walking)    Respiratory: (cough, wheezing, (H) 05/11/2019    CALCIUM 9.4 05/11/2019    PROT 7.3 05/11/2019    LABALBU 4.3 05/11/2019    BILITOT <0.2 05/11/2019    ALKPHOS 83 05/11/2019    AST 17 05/11/2019    ALT 18 05/11/2019    LABGLOM >60 05/11/2019     Lab Results   Component Value Date     05/11/2019    K 3.8 05/11/2019     05/11/2019    CO2 24 05/11/2019    BUN 10 05/11/2019    CREATININE 0.6 05/11/2019    GLUCOSE 154 05/11/2019    CALCIUM 9.4 05/11/2019      No results found for: CHOL  No results found for: TRIG  No results found for: HDL  No results found for: LDLCHOLESTEROL, LDLCALC  No results found for: LABVLDL, VLDL  No results found for: CHOLHDLRATIO  No results found for: LABA1C  No results found for: EAG  No results found for: TSHFT4, TSH  No results found for: VITD25    Assessments Administered:    PHQ9: 8, mild  GAD7: 6, mild    COGNITION SCREEN:    Can spell the word, \"world,\" backwards: Yes  Can do serial 7's: Yes    Assessment:   1. Mild mixed bipolar II disorder ([address]    2. Insomnia due to other mental disorder    3. Generalized anxiety disorder with panic attacks        Plan:  Continue:  Trazodone, 50mg, take 1 tab nightly as needed for sleep  Wellbutrin XL, 150mg, daily  Topamax, 100mg, twice daily (given by Dr. Karmen Reynolds for headaches)  Lamictal, 150mg, daily (in the morning)    Stop:  Melatonin, 3mg, take 1-2 tabs    Decrease:   Hydroxyzine, 10mg, take 1-2 tabs up to 3 times daily as needed for anxiety/panic attacks    Start:  Seroquel, 25mg, daily for anxiety/racing thoughts    Continue therapy with CHAU Giang      Follow up: Return in about 3 months (around 9/1/2020). 1. The risks, benefits, side effects, indications, contraindications, and adverse effects of the medications have been discussed. Yes.  2. The pt has verbalized understanding and has capacity to give informed consent. 3. The Genie [facility] report has been reviewed according to [facility] regulations. 4. Supportive therapy offered.   5. Follow up: Return in about 3 months

## 2020-07-08 ENCOUNTER — OFFICE VISIT (OUTPATIENT)
Dept: PSYCHIATRY | Age: 30
End: 2020-07-08
Payer: COMMERCIAL

## 2020-07-08 PROCEDURE — 90837 PSYTX W PT 60 MINUTES: CPT | Performed by: SOCIAL WORKER
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
## 2020-10-12 ENCOUNTER — OFFICE VISIT (OUTPATIENT)
Dept: PSYCHIATRY | Age: 30
End: 2020-10-12
Payer: COMMERCIAL

## 2020-10-12 PROCEDURE — 90837 PSYTX W PT 60 MINUTES: CPT | Performed by: SOCIAL WORKER
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
## 2020-11-23 ENCOUNTER — OFFICE VISIT (OUTPATIENT)
Dept: NEUROLOGY | Age: 30
End: 2020-11-23
Payer: COMMERCIAL

## 2020-11-23 VITALS
BODY MASS INDEX: 24.11 KG/M2 | HEIGHT: 66 IN | WEIGHT: 150 LBS | DIASTOLIC BLOOD PRESSURE: 67 MMHG | SYSTOLIC BLOOD PRESSURE: 102 MMHG | RESPIRATION RATE: 16 BRPM | HEART RATE: 86 BPM

## 2020-11-23 PROCEDURE — 99213 OFFICE O/P EST LOW 20 MIN: CPT | Performed by: PSYCHIATRY & NEUROLOGY

## 2020-11-23 RX ORDER — FREMANEZUMAB-VFRM 225 MG/1.5ML
225 INJECTION SUBCUTANEOUS
Qty: 1 PEN | Refills: 5 | Status: SHIPPED | OUTPATIENT
Start: 2020-11-23 | End: 2021-05-03

## 2020-11-23 NOTE — PATIENT INSTRUCTIONS
INSTRUCTIONS:  1. Start Ajovy 225 mg SQ monthly. Can get coupon for copay reduction at www. ajovy.[employer].

## 2020-11-23 NOTE — PROGRESS NOTES
[facility]  [address] [place] [address] Drive, [address]  [place], [place]  Phone [phone]  Fax [phone]     [facility] Follow Up Encounter  20 10:48 AM CST    Information:   Patient Name: Moose Castro  :   1990  Age:   31 y.o. MRN:   [id number]  Account #:  [de-identified]  Today:  20    Provider: Lucila Farooq M.D. Chief Complaint:   Chief Complaint   Patient presents with    6 Month Follow-Up       Subjective:   Mooes Castro is a 31 y.o. woman with a history of tension headaches and migraines who is following up. Last visit, Topamax was helping little and Aimovig was started. She never started it due to insurance reasons. She has nearly daily headaches. They are bifrontal and bilateral occipital.  They are associated with fatigue. Sometimes lying down helps. Objective:     Past Medical History:  Past Medical History:   Diagnosis Date    Anxiety     Headache(784.0)     Headache, paroxysmal hemicrania, episodic     Migraine     Tension headache        Past Surgical History:   Procedure Laterality Date    ADENOIDECTOMY      CHOLECYSTECTOMY, LAPAROSCOPIC  12    TUBAL LIGATION         Recent Hospitalizations  · None    Significant Injuries  · None    Habits  Moose Castro reports that she has been smoking cigarettes. She has a 6.00 pack-year smoking history. She has never used smokeless tobacco. She reports current alcohol use. She reports that she does not use drugs.     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    Medications:  Current Outpatient Medications   Medication Sig Dispense Refill    butalbital-APAP-caffeine -40 MG CAPS per capsule Take 1 capsule by mouth every 6 hours as needed for Headaches 40 capsule 1    Fremanezumab-vfrm (AJOVY) 225 MG/1.5ML SOAJ Inject 225 mg into the skin every 30 days 1 pen 5    hydrOXYzine (ATARAX) 10 MG tablet Take 1-2 tabs up to 3 times daily as needed for anxiety/panic attacks. 90 tablet 3    buPROPion (WELLBUTRIN XL) 150 MG extended release tablet TAKE 1 TABLET BY MOUTH ONE TIME A DAY IN THE MORNING 90 tablet 1    lamoTRIgine (LAMICTAL) 150 MG tablet Take 1 tablet by mouth daily 90 tablet 1    QUEtiapine (SEROQUEL) 25 MG tablet Take 1 tablet by mouth daily 30 tablet 3    traZODone (DESYREL) 50 MG tablet Take 1-2 tabs as needed for sleep at night. 60 tablet 3    topiramate (TOPAMAX) 100 MG tablet Take 1 tablet by mouth 2 times daily 60 tablet 5    Erenumab-aooe (AIMOVIG) 140 MG/ML SOAJ Inject 140 mg into the skin every 30 days (Patient not taking: Reported on 11/23/2020) 3 pen 1     No current facility-administered medications for this visit. Allergies:   Allergies as of 11/23/2020 - Review Complete 11/23/2020   Allergen Reaction Noted    Morphine Other (See Comments) 05/18/2017       Review of Systems:  Constitutional: negative for - chills and fever  Eyes:  negative for - visual disturbance and photophobia  HENMT: positive for - headaches and sinus pain  Respiratory: negative for - cough, hemoptysis, and shortness of breath  Cardiovascular: negative for - chest pain and palpitations  Gastrointestinal: negative for - blood in stools, constipation, diarrhea, nausea, and vomiting  Genito-Urinary: negative for - hematuria, urinary frequency, urinary urgency, and urinary retention  Musculoskeletal: negative for - joint pain, joint stiffness, and joint swelling  Hematological and Lymphatic: negative for - bleeding problems, abnormal bruising, and swollen lymph nodes  Endocrine:  negative for - polydipsia and polyphagia  Allergy/Immunology:  negative for - rhinorrhea, sinus congestion, hives  Integument: negative for - negative for - rash, change in moles, new or changing lesions  Psychological: negative for - anxiety and depression  Neurological: negative for - memory loss, numbness/tingling, and weakness     PHYSICAL EXAMINATION:  Vitals:  /67   Pulse 86   Resp 16   Ht 5' 6\" (1.676 m)   Wt 150 lb (68 kg)   BMI 24.21 kg/m²   General appearance:  Alert, well developed, well nourished, in no distress  HEENT:  normocephalic, atraumatic, sclera appear normal, no nasal abnormalities, no rhinorrhea, Ears appear normal, oral mucous membranes are moist without erythema, trachea midline, thyroid is normal, no lymphadenopathy or neck mass. Cardiovascular:  Regular rate and rhythm without murmer. No peripheral edema, No cyanosis or clubbing. No carotid bruits. Pulmonary:  Lungs are clear to auscultation. Breathing appears normal, good expansion, normal effort without use of accessory muscles  Musculoskeletal:  Joints are normal.  No splints, slings, or casts. Spine appears normal with normal posture and range of motion. Integument:  No rash, erythema, or pallor  Psychiatric:  Mood, affect, and behavior appear normal      NEUROLOGIC EXAMINATION:  Mental Status:  alert, oriented to person, place, and time. Speech:  Clear without dysarthria or dysphonia  Language:  Fluent without aphasia  Cranial Nerves:   II Visual fields are full to confrontation   III,IV, VI Extraocular movements are full   VII Facial movements are symmetrical without weakness   VIII Hearing is intact   IX,X Shoulder shrug and head rotation strength are intact   XII No tongue atrophy or fasciculations. Normal tongue protrusion. No tongue weakness  Motor:  Normal strength in both upper and lower extremities. Normal muscle tone and bulk. Deep tendon reflexes are intact and symmetrical in both upper and lower extremities. Martin's signs are absent bilaterally. There is no ankle clonus on either side.    Sensation:  Sensation is intact to light touch, temperature, and vibration in all extremities. Coordination:  Rapid alternating movements are normal in both upper and lower extremities. Finger to nose testing is unimpaired bilaterally. Gait:  Normal station and gait. Assessment:       ICD-10-CM    1. Intractable migraine without aura and without status migrainosus  G43.019 butalbital-APAP-caffeine -40 MG CAPS per capsule   2. Chronic tension-type headache, intractable  G44.221 butalbital-APAP-caffeine -40 MG CAPS per capsule   I discussed the above with her. Plan:   1. Start Ajovy 225 mg SQ monthly. Can get coupon for copay reduction at [email or web address].  2. PRN Fioricet  3.  FU in 6 months    Electronically signed by Mateus Pagan MD on 11/23/20

## 2020-11-24 RX ORDER — BUTALBITAL, ACETAMINOPHEN AND CAFFEINE 300; 40; 50 MG/1; MG/1; MG/1
1 CAPSULE ORAL EVERY 6 HOURS PRN
Qty: 40 CAPSULE | Refills: 1 | Status: SHIPPED | OUTPATIENT
Start: 2020-11-24 | End: 2021-06-23

## 2020-12-14 ENCOUNTER — OFFICE VISIT (OUTPATIENT)
Dept: PSYCHIATRY | Age: 30
End: 2020-12-14
Payer: COMMERCIAL

## 2020-12-14 PROCEDURE — 90837 PSYTX W PT 60 MINUTES: CPT | Performed by: SOCIAL WORKER

## 2020-12-14 NOTE — PATIENT INSTRUCTIONS
10 tips for reaching out and building relationships  1. Talk to one person about your feelings  2. Help someone else by volunteering  3. Have lunch or coffee with a friend  4. Ask a loved one to check in with you regularly  5. Accompany someone to the movies, a concert, or a small get-together  6. Call or email an old friend  9. Go for a walk with a workout aleksandr  8. Schedule a weekly dinner date  9. Meet new people by taking a class or joining a club  10. Confide in a , teacher, or sports       30 Signs of Emotional Abuse    Nothing is more damaging to your confidence and self-esteem than being in an emotionally abusive relationship. Unlike physical abuse which rears its ugly head in dramatic outbursts, emotional abuse can be more insidious and elusive. In some cases, neither the abuser nor the victim are fully aware its happening. The most obvious scenario for emotional abuse is in an intimate relationship in which a man is the abuser and the woman is the victim. However, a variety of studies show that men and women abuse each other at equal rates. In fact, emotional abuse can occur in any relationship -- between parent and child, in friendships, and with relatives. So what is emotional abuse? It involves a regular pattern of verbal offense, threatening, bullying, and constant criticism, as well as more subtle tactics like intimidation, shaming and manipulation. Emotional abuse is used to control and subjugate the other person, and quite often it occurs because the abuser has childhood wounds and insecurities they havent dealt with -- perhaps as a result of being abused themselves. They didnt learn healthy coping mechanisms or how to have positive, healthy relationships. Instead, they feel angry, hurt, fearful and powerless.     Male and female abusers tend to have high rates of personality disorders including borderline personality disorder, narcissistic personality disorder, and antisocial personality disorder. Although emotional abuse doesnt always lead to physical abuse, physical abuse is almost always preceded and accompanied by emotional abuse. The victim of the abuse quite often doesnt see the mistreatment as abusive. They develop coping mechanisms of denial and minimizing in order to deal with the stress. But the effects of long-term emotional abuse can cause severe emotional trauma in the victim, including depression, anxiety, and post traumatic stress disorder. Here are 30 signs of emotional abuse. 1. They humiliate you, put you down, or make fun of you in front of other people. 2. They regularly demean or disregard your opinions, ideas, suggestions, or needs. 3. They use sarcasm or teasing to put you down or make you feel bad about yourself. 4. They accuse you of being too sensitive in order to deflect their abusive remarks. 5. They try to control you and treat you like a child. 6. They correct or chastise you for your behavior. 7. You feel like you need permission to make decisions or go out somewhere. 8. They try to control the finances and how you spend money. 9. They belittle and trivialize you, your accomplishments, or your hopes and dreams. 10. They try to make you feel as though they are always right, and you are wrong. 11. They give you disapproving or contemptuous looks or body language. 12. They regularly point out your flaws, mistakes, or shortcomings. 13. They accuse or blame you of things you know arent true. 14. They have an inability to laugh at themselves and cant tolerate others laughing at them. 15. They are intolerant of any seeming lack of respect. 16. They make excuses for their behavior, try to blame others, and have difficulty apologizing. 17. The repeatedly cross your boundaries and ignore your requests. 18. They blame you for their problems, life difficulties, or unhappiness.   19. They call you names, give you unpleasant labels, or make cutting remarks under their breath. 20. They are emotionally distant or emotionally unavailable most of the time. 21. They resort to pouting or withdrawal to get attention or attain what they want. 22. They dont show you empathy or compassion. 23. They play the victim and try to deflect blame to you rather than taking personal responsibility. 24. They disengage or use neglect or abandonment to punish or frighten you. 25. They dont seem to notice or care about your feelings. 26. They view you as an extension of themselves rather than as an individual.  27. They withhold sex as a way to manipulate and control. 28. They share personal information about you with others. 29. They invalidate or deny their emotionally abusive behavior when confronted. 30. They make subtle threats or negative remarks with the intent to frighten or control you. The first step for those being emotionally abused is recognizing its happening. If you recognize any of the signs of emotional abuse in your relationship, you need to be honest with yourself so you can regain power over your own life, stop the abuse, and begin to heal. For those whove been minimizing, denying, and hiding the abuse, this can be a painful and frightening first step. The stress of emotional abuse will eventually catch up with you in the form of illness, emotional trauma, depression, or anxiety. You simply cant allow it to continue, even if it means ending the relationship. A licensed counselor who is trained in abusive relationships can help you navigate the pain and fears of leaving the relationship and work with you to rebuild your self-esteem.
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
## 2020-12-16 ENCOUNTER — OFFICE VISIT (OUTPATIENT)
Dept: PSYCHIATRY | Age: 30
End: 2020-12-16
Payer: COMMERCIAL

## 2020-12-16 VITALS
OXYGEN SATURATION: 97 % | WEIGHT: 153 LBS | TEMPERATURE: 98 F | DIASTOLIC BLOOD PRESSURE: 65 MMHG | HEART RATE: 82 BPM | SYSTOLIC BLOOD PRESSURE: 104 MMHG | BODY MASS INDEX: 24.69 KG/M2

## 2020-12-16 PROCEDURE — 99215 OFFICE O/P EST HI 40 MIN: CPT | Performed by: PSYCHIATRY & NEUROLOGY

## 2020-12-16 RX ORDER — PRAZOSIN HYDROCHLORIDE 1 MG/1
1 CAPSULE ORAL NIGHTLY
Qty: 30 CAPSULE | Refills: 1 | Status: SHIPPED | OUTPATIENT
Start: 2020-12-16 | End: 2021-04-28 | Stop reason: SINTOL

## 2020-12-16 RX ORDER — LAMOTRIGINE 25 MG/1
25 TABLET ORAL DAILY
Qty: 30 TABLET | Refills: 1 | Status: SHIPPED | OUTPATIENT
Start: 2020-12-16 | End: 2021-05-03
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
## 2020-12-21 ENCOUNTER — TELEPHONE (OUTPATIENT)
Dept: PSYCHIATRY | Age: 30
End: 2020-12-21
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
## 2021-01-05 DIAGNOSIS — Z79.899 HIGH RISK MEDICATION USE: Primary | ICD-10-CM

## 2021-01-06 ENCOUNTER — TELEPHONE (OUTPATIENT)
Dept: PSYCHIATRY | Age: 31
End: 2021-01-06

## 2021-01-06 NOTE — TELEPHONE ENCOUNTER
Contacted the pt as requested by Dr Rj Pizano to see how she was doing as pt rescheduled her appt with her today. Pt reports that she rescheduled her appt due to work but then was called off of work. Pt was asked if she wanted to reschedule for this afternoon if Dr had an opeining and pt said no. Pt stated that she has an appt in 2 weeks. \"I am ok and I will call if I need to before the appt\". Pt verbalized feeling frustrated due to her child having a dropped grade on their report card today. Pt was encouraged to call the office if needed. Dr Rj Pizano provided the above info.

## 2021-01-11 ENCOUNTER — OFFICE VISIT (OUTPATIENT)
Dept: PSYCHIATRY | Age: 31
End: 2021-01-11
Payer: COMMERCIAL

## 2021-01-11 DIAGNOSIS — F31.81 BIPOLAR 2 DISORDER (HCC): Primary | ICD-10-CM

## 2021-01-11 PROCEDURE — 90837 PSYTX W PT 60 MINUTES: CPT | Performed by: SOCIAL WORKER

## 2021-01-11 NOTE — PATIENT INSTRUCTIONS
When someone you care about suffers from post-traumatic stress disorder (PTSD), it can leave you feeling overwhelmed. The changes in your loved one can be worrying or even frightening. You may feel angry about whats happening to your family and relationship, or hurt by your loved ones distance and moodiness. But its important to know is that youre not helpless. Your support can make all the difference in your partner, friend, or family members recovery. With your help, your loved one can overcome PTSD and move on with his or her life. How does PTSD affect relationships? PTSD can take a heavy toll on relationships. It can be hard to understand your loved ones behavior--why they are less affectionate and more volatile. You may feel like youre walking on eggshells or living with a stranger. You may have to take on a bigger share of household tasks, deal with the frustration of a loved one who wont open up, or even deal with anger or disturbing behavior. The symptoms of PTSD can also lead to job loss, substance abuse, and other problems that affect the whole family. Its hard not to take the symptoms of PTSD personally, but its important to remember that a person with PTSD may not always have control over their behavior. Your loved ones nervous system is stuck in a state of constant alert, making them continually feel vulnerable and unsafe. This can lead to anger, irritability, depression, mistrust, and other PTSD symptoms that your loved one cant simply choose to turn off. With the right support from friends and family, though, your loved ones nervous system can become \"unstuck\" and he or she can finally move on from the traumatic event. Helping someone with PTSD tip 1: Provide social support  Its common for people with PTSD to withdraw from friends and family.  While its important to respect your loved ones boundaries, your comfort and support can help the person with PTSD overcome feelings of helplessness, grief, and despair. In fact, trauma experts believe that face-to-face support from others is the most important factor in PTSD recovery. How to support your loved one  Knowing how to best demonstrate your love and support for someone with PTSD isnt always easy. You cant force your loved one to get better, but you can play a major role in the healing process by simply spending time together. Dont pressure your loved one into talking. It can be very difficult for people with PTSD to talk about their traumatic experiences. For some, it can even make things worse. Instead, let them know youre willing to listen when they want to talk, or just hang out when they dont. Comfort for someone with PTSD comes from feeling engaged and accepted by you, not necessarily from talking. Do normal things with your loved one, things that have nothing to do with PTSD or the traumatic experience. Encourage your loved one to participate in rhythmic exercise, seek out friends, and pursue hobbies that bring pleasure. Take a fitness class together, go dancing, or set a regular lunch date with friends and family. Let your loved one take the lead, rather than telling him or her what to do. Everyone with PTSD is different but most people instinctively know what makes them feel calm and safe. Take cues from your loved one as to how you can best provide support and companionship. Manage your own stress. The more calm, relaxed, and focused you are, the better youll be able to help your loved one. Be patient. Recovery is a process that takes time and often involves setbacks. The important thing is to stay positive and maintain support for your loved one. Educate yourself about PTSD. The more you know about the symptoms, effects, and treatment options, the better equipped you'll be to help your loved one, understand what he or she is going through, and keep things in perspective. Accept (and expect) mixed feelings.  As you go through the emotional wringer, be prepared for a complicated mix of feelings--some of which youll never want to admit. Just remember, having negative feelings toward your family member doesnt mean you dont love them. Tip 2: Be a good listener  While you shouldnt push a person with PTSD to talk, if they do choose to share, try to listen without expectations or judgments. Make it clear that youre interested and that you care, but dont worry about giving advice. Its the act of listening attentively that is helpful to your loved one, not what you say. A person with PTSD may need to talk about the traumatic event over and over again. This is part of the healing process, so avoid the temptation to tell your loved one to stop rehashing the past and move on. Some of the things your loved one tells you might be very hard to listen to, but its important to respect their feelings and reactions. If you come across as disapproving or judgmental, they are unlikely to open up to you again. Communication pitfalls to avoid  Don't. .. Give easy answers or blithely tell your loved one everything is going to be okay   Stop your loved one from talking about their feelings or fears   Offer unsolicited advice or tell your loved one what they \"should\" do   Blame all of your relationship or family problems on your loved one's PTSD   Invalidate, minimize, or deny your loved one's traumatic experience   Give ultimatums or make threats or demands   Make your loved one feel weak because they aren't coping as well as others   Tell your loved one they were peggy it wasn't worse   Take over with your own personal experiences or feelings  Tip 3: Rebuild trust and safety  Trauma alters the way a person sees the world, making it seem like a perpetually dangerous and frightening place. It also damages peoples ability to trust others and themselves.  Anything you can do to rebuild your loved ones sense of security will contribute to recovery. Express your commitment to the relationship. Let the person know youre here for the long haul so he or she feels loved and supported. Create routines. Structure and predictable schedules can restore a sense of stability and security to people with PTSD, both adults and children. Creating routines could mean getting your loved one to help with groceries or housework, for example, maintaining regular times for meals, or simply being there for the person. Minimize stress at home. Try to make sure your loved one has space and time for rest and relaxation. Speak of the future and make plans. This can help counteract the common feeling among people with PTSD that their future is limited. Keep your promises. Help rebuild trust by being trustworthy. Be consistent and follow through on the things you say youre going to do. Emphasize your loved ones strengths. Tell your loved one you believe he or she is capable of recovery and point out all your loved ones positive qualities and successes. Encourage your loved one to join a support group. Getting involved with others who have gone through similar traumatic experiences can help some people with PTSD feel less damaged and alone. Tip 4: Anticipate and manage triggers  A trigger is anything--a person, place, thing, or situation--that reminds your loved one of the trauma and sets off a PTSD symptom, such as a flashback. Sometimes, triggers are obvious. For example, a   might be triggered by seeing his combat buddies or by the loud noises that sound like gunfire. Others may take some time to identify and understand, such as hearing a song that was playing when the traumatic event happened, for example, so now that song or even others in the same musical genre are triggers. Similarly, triggers dont have to be external. Internal feelings and sensations can also trigger PTSD symptoms.    Common external PTSD triggers  Sights, sounds, or smells associated with the trauma   People, locations, or things that recall the trauma   Significant dates or times, such as anniversaries or a specific time of day   [employer] (certain types of weather, seasons, etc.)   Conversations or media coverage about trauma or negative news events   Situations that feel confining (stuck in traffic, at the doctor's office, in a crowd)   Relationship, family, school, work, or money pressures or arguments   Funerals, hospitals, or medical treatment  Common internal PTSD triggers  Physical discomfort, such as hunger, thirst, fatigue, sickness, and sexual frustration   Any bodily sensation that recalls the trauma, including pain, old wounds and scars, or a similar injury   Strong emotions, especially feeling helpless, out of control, or trapped   Feelings toward family members, including mixed feelings of love, vulnerability, and resentment  Talking to your loved one about triggers  Ask your loved one about things he or she did in the past in response to a trigger that seemed to help (as well as those that didnt). Then you can come up with a joint game plan for how you will respond in future. Ask what your loved one would like you to do during a nightmare, flashback, or panic attack. Having a plan in place will make the situation less scary for both of you. [employer] also be in a much better position to help your loved one calm down. How to help in the middle of a flashback or panic attack  During a flashback, people often feel a sense of disassociation, as if theyre detached from their own body. Anything you can do to ground them will help.   Tell them theyre having a flashback and that even though it feels real, its not actually happening again   Help remind them of their surroundings (for example, ask them to look around the room and describe out loud what they see)   Encourage them to take deep, slow breaths (hyperventilating will increase feelings of panic)   Avoid sudden movements or anything that might startle them   Ask before you touch them. Touching or putting your arms around the person might make him or her feel trapped, which can lead to greater agitation and even violence  Tip 5: Deal with volatility and anger  PTSD can lead to difficulties managing emotions and impulses. In your loved one, this may manifest as extreme irritability, moodiness, or explosions of rage. Understanding anger in PTSD  People suffering from PTSD live in a constant state of physical and emotional stress. Since they usually have trouble sleeping, it means theyre constantly exhausted, on edge, and physically strung out--increasing the likelihood that theyll overreact to day-to-day stressors. For many people with PTSD, anger can also be a cover for other feelings such as grief, helplessness, or guilt. Anger makes them feel powerful, instead of weak and vulnerable. For others, they try to suppress their anger until it erupts when you least expect it. Watch for signs that your loved one is angry such as clenching jaw or fists, talking louder, or getting agitated. Take steps to defuse the situation as soon as you see the initial warning signs. Try to remain calm. During an emotional outburst, do your best to stay calm. This will communicate to your loved one that you are safe and prevent the situation from escalating. Give the person space. Avoid crowding or grabbing the person. This can make a traumatized person feel threatened. Ask how you can help. For example: What can I do to help you right now?  You can also suggest a time out or change of scenery. Put safety first. If the person gets more upset despite your attempts to calm him or her down, leave the house or lock yourself in a room. Call 911 if you fear that your loved one may hurt himself or others.   Learning how to control anger  Anger is a normal, healthy emotion, but when chronic, explosive anger spirals out of control, it can have serious consequences on a persons relationships, health, and state of mind. Your loved one can get anger under control by exploring the root issues and learning healthier ways to express their feelings. See: Anger Management  Tip 6: Take care of yourself  Letting your family members PTSD dominate your life while ignoring your own needs is a surefire recipe for burnout. In order to have the strength to be there for your loved one over the long haul, you have to nurture and care for yourself. Take care of your physical needs: get enough sleep, exercise regularly, eat properly, and look after any medical issues. Cultivate your own support system. Lean on other family members, trusted friends, your own therapist or support group, or your hector community. Talking about your feelings and what youre going through can be very cathartic. Make time for your own life. Dont give up friends, hobbies, or activities that make you happy. Its important to have things in your life that you look forward to. Spread the responsibility. Ask other family members and friends for assistance so you can take a break. You may also want to seek out respite services in your community. Set boundaries. Be realistic about what youre capable of giving. Know your limits, communicate them to your family member and others involved, and stick to them. Trauma can be \"contagious\"  Caring for someone with PTSD can lead to the potential for secondary traumatization. You can develop your own symptoms from listening to trauma stories or being exposed to disturbing symptoms like flashbacks. The more depleted and overwhelmed you feel, the greater the risk that you may become traumatized. Support for people taking care of veterans  If the person youre caring for is a [address], financial and caregiving support may be available. Visit VA Caregiver Support to explore your options, or call Coaching into Care at ([phone]) 429-8064.  For  veterans in other countries, see the Resources section below for helplines. Related articles    Emotional and Psychological Trauma: Healing from Trauma and Moving On    PTSD in [place]: Symptoms, Treatment, and Self-Help    Alcoholism and Alcohol Abuse: Recognizing the Signs and Symptoms of a Drinking Problem  Resources and references  General help for family members  [facility] - This 24-hour [place] hotline for anyone in emotional distress: 1-034-594-TALK (6087). IASP - Find crisis centers and suicide prevention helplines around the world. (International Association for Suicide Prevention). [facility] organization that helps people understand, recover from, and treat traumatic stress. Includes a referral list of therapists for PTSD. [employer] on Mental Illness ([facility]) - Call the Helpline at 2-134-586-TDCZ (3798) or check out the Tray for caregivers of people with severe mental illness in the [place]  Help for [place] veterans' family members  [facility] - Call [phone]) [phone] for free, confidential coaching designed to help family members learn how to talk to their  about their concerns and about treatment options. [address] [place] [address] confidential, free hotline for veterans and their families and friends. Call 5-735.870.7709 (Press 1) or connect via chat or text (729009). Help for Veterans with PTSD - Learn how to earn how to earn how to enroll for [facility] and get an assessment. [facility] for PTSD)  Give an [address]  organization that offers free mental health services to [employer] personnel and their families affected by the ongoing conflicts in [place] and [place]. 24/7 Stew [place] for [address] [facility] Brain Injury - Get help for traumatic brain injury and other psychological health issues. Call 2-713.354.7336 or connect through chat or email. ([address])  A Guide to [employer] (PDF) - Comprehensive guide to VA mental health services, including programs and resources for PTSD. StartYourRecovery. org is a free, confidential tool that helps individuals take steps toward a healthy relationship with drugs and alcohol. It was developed with the input of leading clinicians, experts from the [address] [place] [address], and people in recovery themselves. Help for veterans and their families in other countries  [place] veterans: visit Operational Stress Injury Social Support ([address]) or call 0-719.623.8599 to talk to a peer who has been through similar experiences. [facility] veterans: visit Combat Stress or call the 24-hour helpline 0309.538.4843. [facility] veterans: visit [address] [facility] ([facility]) or call [phone] information about PTSD in veterans and the family  Effects of PTSD on Family - When someone in the family has PTSD, everyone feels the effects. Learn about common feelings and reactions among family members. [facility] for PTSD)  About Face - Hear the stories of veterans who live with PTSD. Listen to personal experiences about how PTSD affected their families and how treatment turned things around. Returning from the War Zone (PDF) - Learn about issues families face when a spouse returns from war and what can be done to prepare for the reunion and cope with the transition to civilian life. Find PTSD treatment and support groups in the [place] Find Support - Call the [facility] helpline at 7-214-296-HFHH to find a support group near you. ([employer] [place] on Mental Illness)  Trauma Treatment Programs (PDF) - Directory of trauma resources listed by state. [employer])  Find PTSD treatment and support groups in other countries  Where to Get Help - Find different treatment and support options in the [facility].  (PTSD [facility])  Find Help - Find PTSD recovery resources in [facility]. ([facility])  Find Your Local [facility] - Contact your local [facility] division to find support and treatment options in [place] ([facility]). ([address])  Was this article helpful? Yes No     Authors: Joe Bateman M.A., and Daya Briscoe. Last updated: October 2017.

## 2021-01-11 NOTE — PROGRESS NOTES
225 mg into the skin every 30 days 11/23/20   Rosa Young MD   hydrOXYzine (ATARAX) 10 MG tablet Take 1-2 tabs up to 3 times daily as needed for anxiety/panic attacks. 6/1/20   CHRIS Borden NP   buPROPion (WELLBUTRIN XL) 150 MG extended release tablet TAKE 1 TABLET BY MOUTH ONE TIME A DAY IN THE MORNING 6/1/20   CHRIS Borden NP   lamoTRIgine (LAMICTAL) 150 MG tablet Take 1 tablet by mouth daily 6/1/20   CHRIS Borden NP   QUEtiapine (SEROQUEL) 25 MG tablet Take 1 tablet by mouth daily 6/1/20   CHRIS Borden NP   Migel Alosa (AIMOVIG) 140 MG/ML SOAJ Inject 140 mg into the skin every 30 days  Patient not taking: Reported on 11/23/2020 4/6/20   Rosa Young MD   traZODone (DESYREL) 50 MG tablet Take 1-2 tabs as needed for sleep at night. 12/16/19   CHRIS Borden NP       MSE:  Appearance: Appropriately groomed. Made good eye contact. Behavior: Calm, cooperative. No psychomotor retardation/agitation appreciated. Gait unremarkable. No abnormal movements or tremor. Speech: Normal in tone, volume, and quality. No slurring, dysarthria or pressured speech noted. Mood: \"So much better\"   Affect: Mood congruent. Thought Process: Appears linear, logical and goal oriented. Causality appears intact. Thought Content: Denies active suicidal and homicidal ideations. No overt delusions or paranoia appreciated. Perceptions: Denies auditory or visual hallucinations at present time. Not responding to internal stimuli. Concentration: Intact. Orientation: to person, place, date, and situation. Language: Intact. Fund of information: Intact. Memory: Recent and remote appear intact. Impulsivity: Limited. Neurovegitative: Improved appetite and sleep. Insight: Fair. Judgment: Fair.       Lab Results   Component Value Date     05/11/2019    K 3.8 05/11/2019     05/11/2019    CO2 24 05/11/2019    BUN 10 05/11/2019    CREATININE 0.6 05/11/2019 GLUCOSE 154 (H) 05/11/2019    CALCIUM 9.4 05/11/2019    PROT 7.3 05/11/2019    LABALBU 4.3 05/11/2019    BILITOT <0.2 05/11/2019    ALKPHOS 83 05/11/2019    AST 17 05/11/2019    ALT 18 05/11/2019    LABGLOM >60 05/11/2019     Lab Results   Component Value Date     05/11/2019    K 3.8 05/11/2019     05/11/2019    CO2 24 05/11/2019    BUN 10 05/11/2019    CREATININE 0.6 05/11/2019    GLUCOSE 154 05/11/2019    CALCIUM 9.4 05/11/2019      No results found for: CHOL  No results found for: TRIG  No results found for: HDL  No results found for: LDLCHOLESTEROL, LDLCALC  No results found for: LABVLDL, VLDL  No results found for: CHOLHDLRATIO  No results found for: LABA1C  No results found for: EAG  No results found for: TSHFT4, TSH  No results found for: VITD25    Assessments Administered:      Assessment:   1. Bipolar II disorder, mild, depressed, with anxious distress ([facility] [address]    2. Chronic post-traumatic stress disorder (PTSD)    3. Panic disorder      No evidence of acute suicidality, homicidality or psychosis observed today. Notable improvement in mood and affect. Plan:  1. Continue on current medication regimen. The risks, benefits, side effects, indications, contraindications, and adverse effects of the medications have been discussed. Yes.  2. The pt has verbalized understanding and has capacity to give informed consent. 3. The Luis Dewitt report has been reviewed according to [facility] regulations. 4. Supportive therapy offered. Patient will continue seeing her therapist at the clinic. 5. Follow up: Return in about 4 weeks (around 2/17/2021). 6. The patient has been advised to call with any problems. 7. Controlled substance Treatment Plan: plan to taper  8. The above listed medications have been continued, modifications in meds and other orders/labs as follows: No orders of the defined types were placed in this encounter. No orders of the defined types were placed in this encounter.       9. Additional comments: Consider checking thyroid function and vitamin B12 and D levels next time. 10.Over 50% of the total visit time of  30  minutes was spent on counseling and/or coordination of care of:                        1. Bipolar II disorder, mild, depressed, with anxious distress ([facility] [address]    2. Chronic post-traumatic stress disorder (PTSD)    3.  Panic disorder        Eduardo Lees MD
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
## 2021-01-14 ENCOUNTER — TELEPHONE (OUTPATIENT)
Dept: PSYCHIATRY | Age: 31
End: 2021-01-14

## 2021-01-15 RX ORDER — TOPIRAMATE 100 MG/1
TABLET, FILM COATED ORAL
Qty: 60 TABLET | Refills: 5 | Status: SHIPPED | OUTPATIENT
Start: 2021-01-15 | End: 2021-12-23

## 2021-01-15 NOTE — TELEPHONE ENCOUNTER
Requested Prescriptions     Pending Prescriptions Disp Refills    topiramate (TOPAMAX) 100 MG tablet [Pharmacy Med Name: TOPIRAMATE 100MG TABS] 60 tablet 5     Sig: TAKE 1 TABLET BY MOUTH TWO TIMES A DAY       Last Office Visit: 11/23/2020  Next Office Visit: 5/24/2021  Last Medication Refill: 10/3/2019 5 refills

## 2021-01-20 ENCOUNTER — OFFICE VISIT (OUTPATIENT)
Dept: PSYCHIATRY | Age: 31
End: 2021-01-20
Payer: COMMERCIAL

## 2021-01-20 VITALS
TEMPERATURE: 98 F | WEIGHT: 140.8 LBS | BODY MASS INDEX: 22.73 KG/M2 | OXYGEN SATURATION: 100 % | SYSTOLIC BLOOD PRESSURE: 111 MMHG | HEART RATE: 75 BPM | DIASTOLIC BLOOD PRESSURE: 72 MMHG

## 2021-01-20 DIAGNOSIS — F41.0 PANIC DISORDER: ICD-10-CM

## 2021-01-20 DIAGNOSIS — F43.12 CHRONIC POST-TRAUMATIC STRESS DISORDER (PTSD): ICD-10-CM

## 2021-01-20 DIAGNOSIS — F31.81 BIPOLAR II DISORDER, MILD, DEPRESSED, WITH ANXIOUS DISTRESS (HCC): Primary | ICD-10-CM

## 2021-01-20 DIAGNOSIS — Z79.899 HIGH RISK MEDICATION USE: ICD-10-CM

## 2021-01-20 PROCEDURE — 99214 OFFICE O/P EST MOD 30 MIN: CPT | Performed by: PSYCHIATRY & NEUROLOGY

## 2021-01-24 LAB
AMOBARBITAL, URINE: NEGATIVE
AMPHETAMINES, URINE: NEGATIVE NG/ML
BARBITURATES, URINE: ABNORMAL NG/ML
BARBITURATES, URINE: POSITIVE
BENZODIAZEPINES, URINE: NEGATIVE NG/ML
BUTALBITAL, GC/MS URINE: 1866 NG/ML
BUTALBITAL, URINE: POSITIVE
CANNABINOIDS, URINE: NEGATIVE NG/ML
COCAINE METABOLITE, URINE: NEGATIVE NG/ML
CREATININE, URINE: 53.3 MG/DL (ref 20–300)
FENTANYL URINE: NEGATIVE PG/ML
MEPERIDINE, UR: NEGATIVE NG/ML
METHADONE SCREEN, URINE: NEGATIVE NG/ML
OPIATES, URINE: NEGATIVE NG/ML
OXYCODONE/OXYMORPHONE, UR: NEGATIVE NG/ML
PENTOBARBITAL, UR: NEGATIVE
PH, URINE: 6.7 (ref 4.5–8.9)
PHENCYCLIDINE, URINE: NEGATIVE NG/ML
PHENOBARBITAL, UR: NEGATIVE
PROPOXYPHENE, URINE: NEGATIVE NG/ML
SECOBARBITAL, UR: NEGATIVE

## 2021-01-25 ENCOUNTER — OFFICE VISIT (OUTPATIENT)
Dept: PSYCHIATRY | Age: 31
End: 2021-01-25
Payer: COMMERCIAL

## 2021-01-25 DIAGNOSIS — F31.81 BIPOLAR 2 DISORDER (HCC): Primary | ICD-10-CM

## 2021-01-25 PROCEDURE — 90837 PSYTX W PT 60 MINUTES: CPT | Performed by: SOCIAL WORKER

## 2021-01-25 ASSESSMENT — ANXIETY QUESTIONNAIRES
1. FEELING NERVOUS, ANXIOUS, OR ON EDGE: 0-NOT AT ALL
6. BECOMING EASILY ANNOYED OR IRRITABLE: 0-NOT AT ALL
3. WORRYING TOO MUCH ABOUT DIFFERENT THINGS: 0-NOT AT ALL
GAD7 TOTAL SCORE: 2

## 2021-01-25 ASSESSMENT — PATIENT HEALTH QUESTIONNAIRE - PHQ9
SUM OF ALL RESPONSES TO PHQ QUESTIONS 1-9: 4
SUM OF ALL RESPONSES TO PHQ QUESTIONS 1-9: 4
SUM OF ALL RESPONSES TO PHQ9 QUESTIONS 1 & 2: 1
1. LITTLE INTEREST OR PLEASURE IN DOING THINGS: 1
3. TROUBLE FALLING OR STAYING ASLEEP: 1
4. FEELING TIRED OR HAVING LITTLE ENERGY: 0

## 2021-01-25 NOTE — PATIENT INSTRUCTIONS
THINKING ERRORS    1. Mind reading: You assume that you know what people think without having sufficient evidence of their thoughts. \"He thinks I'm a loser. \"    2. Fortune telling: You predict the future- that things will get worse or that there is danger ahead. \"I'll fail that exam\" and \"I won't get the job. \"    3. Catastrophizing: You believe that what has happened or will happen will be so awful and unbearable that you won't be able to stand it. \"It would be terrible if I failed. \"    4. Labeling: You assign global negative traits to yourself and others. \"Im a failure\" or \"He's a rotten person. \"    5. Discounting positives: You claim that the positives that you or others attain are trivial. \"That's what wives are supposed to do--so it doesn't count when she's nice to me. \" \"Those successes were easy, so they don't matter. \"    6. Negative filter: You focus almost exclusively on the negatives and seldom notice the positives. \"Look at all of the people who don't like me. \"    7. Overgeneralizing: You perceive a global pattern of negatives on the basis of a single incident. \"This generally happens to me. I seem to fail at a lot of things. \"    8. Dichotomous thinking: You view events, or people, in all-or-nothing terms. \"I get rejected by everyone\" or \"It was a waste of time. \"    9. Shoulds: You interpret events in terms of how things should be or should have gone rather than simply focusing on what is. \"I should do well. If I don't, then I'm a failure. \"    10. Personalizing: You attribute a disproportionate amount of the blame to yourself for negative events and fail to see that certain events are also caused by others. \"The marriage ended because I failed\"    6. Blaming: You focus on the other person as the source of your negative feelings and you refuse to take responsibility for changing yourself. \"She's to blame for the way I feel now\" or \"My parents caused all my problems. \"    12. Unfair comparisons:  You interpret events in terms of standards that are unrealistic-for example, you focus primarily on others who do better than you and find yourself inferior in the comparison. \"She's more successful than I am\" or \"Others did better than I did on the test.\"    13. Regret orientation: You focus on the idea that you could have done better in the past, rather on what you can do better now. \"I could have had a better job if I had tried\". (best friends with the Shoulds)     15. What if?: You keep asking a series of questions about \"What if\" something happens and fail to be satisfied with any of the answers. \"Yeah, but what if I get anxious? Or what if I can't catch my breath? \"    15. Emotional reasoning: You let your feelings guide your interpretation of reality-for example, \"I feel depressed, therefore my marriage is not working out. \"    16. Inability to disconfirm: You reject any evidence or arguments that might contradict your negative thoughts. For example, when you have the thought \"I'm unlovable\", you reject as irrelevant any evidence that people like you. Consequently, your thought cannot be refuted. \"That's not the real issue. There are deeper problems. There are other factors. \"    17. Judgment Focus: You view yourself, others and events in terms of evaluations of good, bad or superior-inferior, rather than simply describing, accepting, or understanding. You are continually measuring yourself and others according to arbitrary standards, finding that you and others fall short. You are focused on the judgments of others as well as your own judgments of yourself. \"I didn't perform well in college\" or \"If I take up tennis, I won't do well\" or \"Look how successful she is. I'm not successful\". 10 tips for reaching out and building relationships  1. Talk to one person about your feelings  2. Help someone else by volunteering  3. Have lunch or coffee with a friend  4.  Ask a loved one to check in with you regularly  5. Accompany someone to the movies, a concert, or a small get-together  6. Call or email an old friend  9. Go for a walk with a workout [email or web address]  8. Schedule a weekly dinner date  9. Meet new people by taking a class or joining a club  10.  Confide in a , teacher, or sports
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
## 2021-01-25 NOTE — LETTER
[address] [facility]  [address] [place] [address] [facility]  [address] [address]  Phone: [phone]  Fax: [phone]    Reg Hanedwardo        January 25, 2021     Patient: Marilin Howard   YOB: 1990   Date of Visit: 1/25/2021       To Whom it May Concern:    Marilin Howard was seen in my clinic on 1/25/2021 for therapy and saw medication management on 1/20/21. She began in this clinic June 2019. On 7/29/19 she had a PHQ9 score of 16 (moderatly severe) and a HAM-A score 37 (severe). Today PHQ9- Score: 4 ~ 04 Minimal and BRETT-7 Score: 2 ~ 04: minimal anxiety.     Sincerely,         BRENDA Mclean

## 2021-01-27 ENCOUNTER — APPOINTMENT (OUTPATIENT)
Dept: CT IMAGING | Age: 31
End: 2021-01-27
Payer: COMMERCIAL

## 2021-01-27 ENCOUNTER — HOSPITAL ENCOUNTER (EMERGENCY)
Age: 31
Discharge: HOME | End: 2021-01-27
Attending: EMERGENCY MEDICINE
Payer: COMMERCIAL

## 2021-01-27 ENCOUNTER — APPOINTMENT (OUTPATIENT)
Dept: ULTRASOUND IMAGING | Age: 31
End: 2021-01-27
Payer: COMMERCIAL

## 2021-01-27 VITALS
RESPIRATION RATE: 16 BRPM | HEART RATE: 46 BPM | DIASTOLIC BLOOD PRESSURE: 57 MMHG | SYSTOLIC BLOOD PRESSURE: 86 MMHG | OXYGEN SATURATION: 100 % | TEMPERATURE: 98.4 F

## 2021-01-27 DIAGNOSIS — R93.5 ABNORMAL ABDOMINAL CT SCAN: ICD-10-CM

## 2021-01-27 DIAGNOSIS — N39.0 URINARY TRACT INFECTION WITHOUT HEMATURIA, SITE UNSPECIFIED: Primary | ICD-10-CM

## 2021-01-27 DIAGNOSIS — N83.202 CYST OF LEFT OVARY: ICD-10-CM

## 2021-01-27 LAB
ABO/RH: NORMAL
ALBUMIN SERPL-MCNC: 4 G/DL (ref 3.5–5.2)
ALP BLD-CCNC: 72 U/L (ref 35–104)
ALT SERPL-CCNC: 7 U/L (ref 5–33)
ANION GAP SERPL CALCULATED.3IONS-SCNC: 11 MMOL/L (ref 7–19)
ANTIBODY SCREEN: NORMAL
AST SERPL-CCNC: 10 U/L (ref 5–32)
BACTERIA: ABNORMAL /HPF
BASOPHILS ABSOLUTE: 0 K/UL (ref 0–0.2)
BASOPHILS RELATIVE PERCENT: 0.6 % (ref 0–1)
BILIRUB SERPL-MCNC: <0.2 MG/DL (ref 0.2–1.2)
BILIRUBIN URINE: NEGATIVE
BLOOD, URINE: ABNORMAL
BUN BLDV-MCNC: 10 MG/DL (ref 6–20)
CALCIUM SERPL-MCNC: 8.9 MG/DL (ref 8.6–10)
CHLORIDE BLD-SCNC: 108 MMOL/L (ref 98–111)
CLARITY: ABNORMAL
CO2: 19 MMOL/L (ref 22–29)
COLOR: YELLOW
CREAT SERPL-MCNC: 0.6 MG/DL (ref 0.5–0.9)
CRYSTALS, UA: ABNORMAL /HPF
EKG P AXIS: 63 DEGREES
EKG P-R INTERVAL: 156 MS
EKG Q-T INTERVAL: 422 MS
EKG QRS DURATION: 98 MS
EKG QTC CALCULATION (BAZETT): 401 MS
EKG T AXIS: 68 DEGREES
EOSINOPHILS ABSOLUTE: 0 K/UL (ref 0–0.6)
EOSINOPHILS RELATIVE PERCENT: 0 % (ref 0–5)
EPITHELIAL CELLS, UA: 1 /HPF (ref 0–5)
GFR AFRICAN AMERICAN: >59
GFR NON-AFRICAN AMERICAN: >60
GLUCOSE BLD-MCNC: 116 MG/DL (ref 74–109)
GLUCOSE URINE: NEGATIVE MG/DL
HCG QUALITATIVE: NEGATIVE
HCT VFR BLD CALC: 40.8 % (ref 37–47)
HEMOGLOBIN: 13 G/DL (ref 12–16)
HYALINE CASTS: 59 /HPF (ref 0–8)
IMMATURE GRANULOCYTES #: 0 K/UL
INR BLD: 1.09 (ref 0.88–1.18)
KETONES, URINE: ABNORMAL MG/DL
LEUKOCYTE ESTERASE, URINE: ABNORMAL
LIPASE: 29 U/L (ref 13–60)
LYMPHOCYTES ABSOLUTE: 2.6 K/UL (ref 1.1–4.5)
LYMPHOCYTES RELATIVE PERCENT: 39.6 % (ref 20–40)
MCH RBC QN AUTO: 29.9 PG (ref 27–31)
MCHC RBC AUTO-ENTMCNC: 31.9 G/DL (ref 33–37)
MCV RBC AUTO: 93.8 FL (ref 81–99)
MONOCYTES ABSOLUTE: 0.4 K/UL (ref 0–0.9)
MONOCYTES RELATIVE PERCENT: 6.4 % (ref 0–10)
NEUTROPHILS ABSOLUTE: 3.5 K/UL (ref 1.5–7.5)
NEUTROPHILS RELATIVE PERCENT: 53.1 % (ref 50–65)
NITRITE, URINE: POSITIVE
PDW BLD-RTO: 14.6 % (ref 11.5–14.5)
PH UA: 6 (ref 5–8)
PLATELET # BLD: 212 K/UL (ref 130–400)
PMV BLD AUTO: 11.4 FL (ref 9.4–12.3)
POTASSIUM SERPL-SCNC: 3.7 MMOL/L (ref 3.5–5)
PROTEIN UA: 30 MG/DL
PROTHROMBIN TIME: 14.1 SEC (ref 12–14.6)
RBC # BLD: 4.35 M/UL (ref 4.2–5.4)
RBC UA: 5 /HPF (ref 0–4)
SODIUM BLD-SCNC: 138 MMOL/L (ref 136–145)
SPECIFIC GRAVITY UA: 1.02 (ref 1–1.03)
TOTAL PROTEIN: 6.7 G/DL (ref 6.6–8.7)
UROBILINOGEN, URINE: 1 E.U./DL
WBC # BLD: 6.6 K/UL (ref 4.8–10.8)
WBC UA: 145 /HPF (ref 0–5)

## 2021-01-27 PROCEDURE — 86901 BLOOD TYPING SEROLOGIC RH(D): CPT

## 2021-01-27 PROCEDURE — 6360000004 HC RX CONTRAST MEDICATION: Performed by: EMERGENCY MEDICINE

## 2021-01-27 PROCEDURE — 96376 TX/PRO/DX INJ SAME DRUG ADON: CPT

## 2021-01-27 PROCEDURE — 36415 COLL VENOUS BLD VENIPUNCTURE: CPT

## 2021-01-27 PROCEDURE — 99282 EMERGENCY DEPT VISIT SF MDM: CPT

## 2021-01-27 PROCEDURE — 96366 THER/PROPH/DIAG IV INF ADDON: CPT

## 2021-01-27 PROCEDURE — 6360000002 HC RX W HCPCS: Performed by: EMERGENCY MEDICINE

## 2021-01-27 PROCEDURE — 87086 URINE CULTURE/COLONY COUNT: CPT

## 2021-01-27 PROCEDURE — 74177 CT ABD & PELVIS W/CONTRAST: CPT

## 2021-01-27 PROCEDURE — 83690 ASSAY OF LIPASE: CPT

## 2021-01-27 PROCEDURE — 87186 SC STD MICRODIL/AGAR DIL: CPT

## 2021-01-27 PROCEDURE — 93005 ELECTROCARDIOGRAM TRACING: CPT | Performed by: EMERGENCY MEDICINE

## 2021-01-27 PROCEDURE — 86850 RBC ANTIBODY SCREEN: CPT

## 2021-01-27 PROCEDURE — 80053 COMPREHEN METABOLIC PANEL: CPT

## 2021-01-27 PROCEDURE — 96365 THER/PROPH/DIAG IV INF INIT: CPT

## 2021-01-27 PROCEDURE — 85025 COMPLETE CBC W/AUTO DIFF WBC: CPT

## 2021-01-27 PROCEDURE — 2580000003 HC RX 258: Performed by: EMERGENCY MEDICINE

## 2021-01-27 PROCEDURE — 86900 BLOOD TYPING SEROLOGIC ABO: CPT

## 2021-01-27 PROCEDURE — 84703 CHORIONIC GONADOTROPIN ASSAY: CPT

## 2021-01-27 PROCEDURE — 85610 PROTHROMBIN TIME: CPT

## 2021-01-27 PROCEDURE — 76830 TRANSVAGINAL US NON-OB: CPT

## 2021-01-27 PROCEDURE — 96375 TX/PRO/DX INJ NEW DRUG ADDON: CPT

## 2021-01-27 PROCEDURE — 81001 URINALYSIS AUTO W/SCOPE: CPT

## 2021-01-27 RX ORDER — NAPROXEN 500 MG/1
500 TABLET ORAL 2 TIMES DAILY WITH MEALS
Qty: 60 TABLET | Refills: 0 | Status: SHIPPED | OUTPATIENT
Start: 2021-01-27 | End: 2021-05-03

## 2021-01-27 RX ORDER — AMOXICILLIN AND CLAVULANATE POTASSIUM 875; 125 MG/1; MG/1
1 TABLET, FILM COATED ORAL 2 TIMES DAILY
Qty: 14 TABLET | Refills: 0 | Status: SHIPPED | OUTPATIENT
Start: 2021-01-27 | End: 2021-02-03

## 2021-01-27 RX ORDER — PROMETHAZINE HYDROCHLORIDE 25 MG/ML
12.5 INJECTION, SOLUTION INTRAMUSCULAR; INTRAVENOUS ONCE
Status: COMPLETED | OUTPATIENT
Start: 2021-01-27 | End: 2021-01-27

## 2021-01-27 RX ORDER — HYDROCODONE BITARTRATE AND ACETAMINOPHEN 5; 325 MG/1; MG/1
1 TABLET ORAL EVERY 6 HOURS PRN
Qty: 10 TABLET | Refills: 0 | Status: SHIPPED | OUTPATIENT
Start: 2021-01-27 | End: 2021-01-30

## 2021-01-27 RX ORDER — SODIUM CHLORIDE, SODIUM LACTATE, POTASSIUM CHLORIDE, CALCIUM CHLORIDE 600; 310; 30; 20 MG/100ML; MG/100ML; MG/100ML; MG/100ML
1000 INJECTION, SOLUTION INTRAVENOUS ONCE
Status: COMPLETED | OUTPATIENT
Start: 2021-01-27 | End: 2021-01-27

## 2021-01-27 RX ORDER — FENTANYL CITRATE 50 UG/ML
25 INJECTION, SOLUTION INTRAMUSCULAR; INTRAVENOUS ONCE
Status: COMPLETED | OUTPATIENT
Start: 2021-01-27 | End: 2021-01-27

## 2021-01-27 RX ORDER — ONDANSETRON 2 MG/ML
4 INJECTION INTRAMUSCULAR; INTRAVENOUS ONCE
Status: COMPLETED | OUTPATIENT
Start: 2021-01-27 | End: 2021-01-27

## 2021-01-27 RX ORDER — KETOROLAC TROMETHAMINE 30 MG/ML
15 INJECTION, SOLUTION INTRAMUSCULAR; INTRAVENOUS ONCE
Status: COMPLETED | OUTPATIENT
Start: 2021-01-27 | End: 2021-01-27

## 2021-01-27 RX ORDER — ONDANSETRON 4 MG/1
4 TABLET, ORALLY DISINTEGRATING ORAL EVERY 8 HOURS PRN
Qty: 30 TABLET | Refills: 0 | Status: SHIPPED | OUTPATIENT
Start: 2021-01-27 | End: 2021-05-03

## 2021-01-27 RX ADMIN — FENTANYL CITRATE 25 MCG: 50 INJECTION, SOLUTION INTRAMUSCULAR; INTRAVENOUS at 07:17

## 2021-01-27 RX ADMIN — PROMETHAZINE HYDROCHLORIDE 12.5 MG: 25 INJECTION INTRAMUSCULAR; INTRAVENOUS at 07:45

## 2021-01-27 RX ADMIN — KETOROLAC TROMETHAMINE 15 MG: 30 INJECTION, SOLUTION INTRAMUSCULAR at 07:16

## 2021-01-27 RX ADMIN — SODIUM CHLORIDE, POTASSIUM CHLORIDE, SODIUM LACTATE AND CALCIUM CHLORIDE 1000 ML: 600; 310; 30; 20 INJECTION, SOLUTION INTRAVENOUS at 07:17

## 2021-01-27 RX ADMIN — FENTANYL CITRATE 25 MCG: 50 INJECTION, SOLUTION INTRAMUSCULAR; INTRAVENOUS at 07:44

## 2021-01-27 RX ADMIN — CEFTRIAXONE 1000 MG: 1 INJECTION, POWDER, FOR SOLUTION INTRAMUSCULAR; INTRAVENOUS at 09:25

## 2021-01-27 RX ADMIN — IOPAMIDOL 90 ML: 755 INJECTION, SOLUTION INTRAVENOUS at 10:20

## 2021-01-27 RX ADMIN — SODIUM CHLORIDE, POTASSIUM CHLORIDE, SODIUM LACTATE AND CALCIUM CHLORIDE 1000 ML: 600; 310; 30; 20 INJECTION, SOLUTION INTRAVENOUS at 09:25

## 2021-01-27 RX ADMIN — ONDANSETRON HYDROCHLORIDE 4 MG: 2 SOLUTION INTRAMUSCULAR; INTRAVENOUS at 07:17

## 2021-01-27 ASSESSMENT — ENCOUNTER SYMPTOMS
ABDOMINAL PAIN: 1
COUGH: 0
NAUSEA: 1
BACK PAIN: 1
SHORTNESS OF BREATH: 0

## 2021-01-27 ASSESSMENT — PAIN SCALES - GENERAL
PAINLEVEL_OUTOF10: 9
PAINLEVEL_OUTOF10: 9

## 2021-01-27 NOTE — ED PROVIDER NOTES
[facility]  eMERGENCY dEPARTMENT eNCOUnter      Pt Name: Alexis Traylor  MRN: [id number]  [place] 1990  Date of evaluation: 1/27/2021  Provider: Sirisha Mccallum MD    CHIEF COMPLAINT       Chief Complaint   Patient presents with    Dizziness     presents with dizzy and low blood pressure    Pelvic Pain     also cramps from period         HISTORY OF PRESENT ILLNESS   (Location/Symptom, Timing/Onset,Context/Setting, Quality, Duration, Modifying Factors, Severity)  Note limiting factors. Alexis Traylor is a 31 y.o. female who presents to the emergency department with severe lower abdominal pain seems more right than left it came on rather suddenly this morning as she was walking to work. She states she has been on her menses since Monday. Patient takes a bunch of medication she took her medications as prescribed last night nothing this morning. Patient states her blood pressure usually runs around 100 she has had a little low blood pressure in the 80s in the 90s now. She works on the 6 floor here at the hospital.  The patient states that she has severe crampy pain she is had vaginal bleeding no history of STDs her tubes have been tied and she denies being pregnant. The patient has nausea now as well. She denies any Covid symptoms. She was fine up until this morning she does still have an appendix however she did not feel ill last night the pain this morning was sudden and she had severe cramping in her lower abdomen worse than her usual period cramps. There is been no trauma. She has not had true syncope. The history is provided by the patient. NursingNotes were reviewed. REVIEW OF SYSTEMS    (2-9 systems for level 4, 10 or more for level 5)     Review of Systems   Constitutional: Negative for fever. Respiratory: Negative for cough and shortness of breath. Gastrointestinal: Positive for abdominal pain and nausea. Genitourinary: Positive for pelvic pain and vaginal bleeding. Musculoskeletal: Positive for back pain. Skin: Negative for rash and wound. Neurological: Positive for light-headedness. Negative for seizures. Psychiatric/Behavioral: Negative for confusion. The patient is nervous/anxious. A complete review of systems was performed and is negative except as noted above in the HPI. PAST MEDICAL HISTORY     Past Medical History:   Diagnosis Date    Anxiety     Bipolar 2 disorder ([facility] 1/25/2021    Headache(784.0)     Headache, paroxysmal hemicrania, episodic     Migraine     Tension headache          SURGICAL HISTORY       Past Surgical History:   Procedure Laterality Date    ADENOIDECTOMY      CHOLECYSTECTOMY, LAPAROSCOPIC  11/5/12    TUBAL LIGATION           CURRENT MEDICATIONS       Previous Medications    BUPROPION (WELLBUTRIN XL) 150 MG EXTENDED RELEASE TABLET    TAKE 1 TABLET BY MOUTH ONE TIME A DAY IN THE MORNING    BUTALBITAL-APAP-CAFFEINE -40 MG CAPS PER CAPSULE    Take 1 capsule by mouth every 6 hours as needed for Headaches    ERENUMAB-AOOE (AIMOVIG) 140 MG/ML SOAJ    Inject 140 mg into the skin every 30 days    FREMANEZUMAB-VFRM (AJOVY) 225 MG/1.5ML SOAJ    Inject 225 mg into the skin every 30 days    HYDROXYZINE (ATARAX) 10 MG TABLET    Take 1-2 tabs up to 3 times daily as needed for anxiety/panic attacks. LAMOTRIGINE (LAMICTAL) 150 MG TABLET    Take 1 tablet by mouth daily    LAMOTRIGINE (LAMICTAL) 25 MG TABLET    Take 1 tablet by mouth daily    PRAZOSIN (MINIPRESS) 1 MG CAPSULE    Take 1 capsule by mouth nightly    QUETIAPINE (SEROQUEL) 25 MG TABLET    Take 1 tablet by mouth daily    TOPIRAMATE (TOPAMAX) 100 MG TABLET    TAKE 1 TABLET BY MOUTH TWO TIMES A DAY    TRAZODONE (DESYREL) 50 MG TABLET    Take 1-2 tabs as needed for sleep at night.        ALLERGIES     Morphine    FAMILY HISTORY       Family History   Problem Relation Age of Onset    Diabetes Maternal Aunt     Heart Failure Maternal Grandmother     Diabetes Paternal Grandfather     High Blood Pressure Paternal Grandfather           SOCIAL HISTORY       Social History     Socioeconomic History    Marital status:      Spouse name: Woo Goodrich Number of children: 2    Years of education: 15    Highest education level: Some college, no degree   Occupational History    Occupation: Behavioral Tech      Comment: [employer]   Social Needs    Financial resource strain: None    Food insecurity     Worry: None     Inability: None    Transportation needs     Medical: None     Non-medical: None   Tobacco Use    Smoking status: Current Every Day Smoker     Packs/day: 1.00     Years: 6.00     Pack years: 6.00     Types: Cigarettes    Smokeless tobacco: Never Used   Substance and Sexual Activity    Alcohol use: Yes     Frequency: Monthly or less     Drinks per session: 1 or 2     Binge frequency: Less than monthly     Comment: rarely    Drug use: No    Sexual activity: Yes     Comment: tubal ligation   Lifestyle    Physical activity     Days per week: None     Minutes per session: None    Stress: None   Relationships    Social connections     Talks on phone: None     Gets together: None     Attends Holiness service: None     Active member of club or organization: None     Attends meetings of clubs or organizations: None     Relationship status: None    Intimate partner violence     Fear of current or ex partner: None     Emotionally abused: None     Physically abused: None     Forced sexual activity: None   Other Topics Concern    None   Social History Narrative    PREVIOUS MEDICATION TRIALS    Lamictal    Celexa (says it made her worse)    Trazodone        PREVIOUS PSYCHIATRIC HISTORY    She says that she has been treated for anxiety/depression off and on since age 20.              FAMILY PSYCHIATRIC HISTORY    Father, PTSD (combat ), alcoholic, rages (has been sober for the last 6 yrs)    Paternal grandparents are alcoholics    Maternal grandfather, alcoholic    Brother, takes Paxil (doesn't know)    Son, ADHD, (possibly ODD, possibly Bipolar)    Two cousins, bipolar disorder    Mother, anxiety, depression, insomnia, mood swings        PREVIOUS SUICIDE ATTEMPTS: Once when she was teenager, [employer] year in high school, (cut herself). She says that she called a friend to come and help her clean it up and she didn't report it or say anything to anyone else about it. History of cutting (has not done this in a couple of years, says she has a wonderful  who helps her with this). INPATIENT HOSPITALIZATIONS: denies        REHABILITATION:denies       SCREENINGS             PHYSICAL EXAM    (up to 7 for level 4, 8 or more for level 5)     ED Triage Vitals [01/27/21 0659]   BP Temp Temp src Pulse Resp SpO2 Height Weight   (!) 93/54 98.4 °F (36.9 °C) -- 73 16 98 % -- --       Physical Exam  Vitals signs and nursing note reviewed. Constitutional:       Appearance: She is not diaphoretic. Comments: In pain laying in bed    Thin, anxious   HENT:      Head: Normocephalic and atraumatic. Eyes:      Extraocular Movements: Extraocular movements intact. Pupils: Pupils are equal, round, and reactive to light. Neck:      Musculoskeletal: Normal range of motion and neck supple. Cardiovascular:      Rate and Rhythm: Normal rate and regular rhythm. Comments: Weak radial pulses  Pulmonary:      Effort: Pulmonary effort is normal. No respiratory distress. Breath sounds: Normal breath sounds. Abdominal:      General: Abdomen is flat. Comments: Tenderness in the lower abdomen and suprapubic area right greater than left   Musculoskeletal: Normal range of motion. General: No swelling or tenderness. Skin:     General: Skin is warm and dry. Neurological:      General: No focal deficit present. Mental Status: She is alert and oriented to person, place, and time. GCS: GCS eye subscore is 4. GCS verbal subscore is 5.  GCS motor subscore is 6. Motor: Motor function is intact. Psychiatric:      Comments: Very pleasant but anxious         DIAGNOSTIC RESULTS     EKG: All EKG's are interpreted by the Emergency Department Physician who either signs or Co-signs this chart in the absence of a cardiologist.        RADIOLOGY:   Non-plain film images such as CT, Ultrasound and MRI are read by the radiologist. Toyin Moralesuro images are visualized and preliminarily interpreted by the emergency physician with the below findings:        Interpretation per the Radiologist below, if available at the time of this note:    CT ABDOMEN PELVIS W IV CONTRAST Additional Contrast? None   Final Result   The changes in the ascending colon is present acute   colitis. The Cystic changes in the adnexa bilaterally. A dominant cyst in the   left ovary. It measures over 3 cm. A follow-up is recommended. The diffuse increased vascularity around the uterus and adnexa and   small bowel amount of free fluid in the pelvis is probably   physiological/cyclical.   The changes of the liver described above are nonspecific and clinical   significance is not certain. Signed by Dr George Rachel on 1/27/2021 10:38 AM      US NON OB TRANSVAGINAL   Final Result   A normal uterus and endometrium. A thick-walled cystic nodule in the left ovary. This may represent a   dominant follicle. A follow-up examination in 2 weeks may be obtained   to ensure stability/resolution.    Signed by Dr George Rachel on 1/27/2021 8:01 AM            ED BEDSIDE ULTRASOUND:   Performed by ED Physician - none    LABS:  Labs Reviewed   COMPREHENSIVE METABOLIC PANEL - Abnormal; Notable for the following components:       Result Value    CO2 19 (*)     Glucose 116 (*)     All other components within normal limits   CBC WITH AUTO DIFFERENTIAL - Abnormal; Notable for the following components:    MCHC 31.9 (*)     RDW 14.6 (*)     All other components within normal limits   URINE RT REFLEX TO CULTURE - Abnormal; Notable for the following components:    Clarity, UA CLOUDY (*)     Ketones, Urine TRACE (*)     Blood, Urine SMALL (*)     Protein, UA 30 (*)     Nitrite, Urine POSITIVE (*)     Leukocyte Esterase, Urine LARGE (*)     All other components within normal limits   MICROSCOPIC URINALYSIS - Abnormal; Notable for the following components:    Bacteria, UA 4+ (*)     Crystals, UA NEG (*)     Hyaline Casts, UA 59 (*)     WBC,  (*)     RBC, UA 5 (*)     All other components within normal limits   CULTURE, URINE   LIPASE   PROTIME-INR   HCG, SERUM, QUALITATIVE   TYPE AND SCREEN       All other labs were within normal range or not returned as of this dictation. EMERGENCY DEPARTMENT COURSE and DIFFERENTIALDIAGNOSIS/MDM:   Vitals:    Vitals:    01/27/21 0659 01/27/21 0826 01/27/21 0934   BP: (!) 93/54 (!) 110/56 (!) 80/52   Pulse: 73 (!) 46    Resp: 16 16    Temp: 98.4 °F (36.9 °C)     SpO2: 98% 100%        MDM  Number of Diagnoses or Management Options  Cyst of left ovary: new and requires workup  Urinary tract infection without hematuria, site unspecified: new and requires workup  Diagnosis management comments: Differential includes ectopic although very low given her tubes being tied. Will send a pregnancy test anyways. Patient is on her menses. This may just be vasovagal and or pain related. The patient could have which I think is most likely a ruptured hemorrhagic or cyst.  Rule out torsion as well. Lower on the differential appendicitis and or kidney stone. Given presentation. And time course. IV fluid bolus pain control nausea control. Reassess. Obtain ultrasound. Labs as above. 9:49 AM  Pain has eased    BP still low    Has UTI treat as well, denies symptoms    Pt will get ct scan too given not impressive US read    I still think ruptured an ovarian / hemorrhagic cyst    eval for ff on ct as well.     HR 66 sat 100 % RR 19    PT BP has been between  SBP    Currently at 1100 is now 86/57    Discussed admitting her    She feels MUCH better has good color and would like to go home and her parents are medical she tells me    Admit offered    BP usually runs around 100 she is skinny    HR 71    Pt in no distress or pain now and has good color and feels better wants to eat    ? Colitis based on CT read    I still think she ruptured a cyst today causing presentation    Found to have UTI    Start abx    She is on other meds will not do cipro flagyl but rather augmentin, treat urine and ? Colitis    Worry about med interactions with cipro in her and side effects    Rocephin given urine culture pending    Pt asking to go will return if worsens     Discussed follow up in a couple weeks for US again        Amount and/or Complexity of Data Reviewed  Clinical lab tests: ordered and reviewed  Tests in the radiology section of CPT®: ordered and reviewed  Decide to obtain previous medical records or to obtain history from someone other than the patient: yes    Patient Progress  Patient progress: improved        CONSULTS:  None    PROCEDURES:  Unless otherwise notedbelow, none     Procedures    FINAL IMPRESSION     1. Urinary tract infection without hematuria, site unspecified    2. Cyst of left ovary    3. Abnormal abdominal CT scan          DISPOSITION/PLAN   DISPOSITION        PATIENT REFERRED TO:  Shreyas Chang MD  [facility] [address] [phone]    Schedule an appointment as soon as possible for a visit in 1 week  to get a PCP for follow up and go thru your scans and test in detail for any incidental findings and recheck      DISCHARGE MEDICATIONS:  New Prescriptions    AMOXICILLIN-CLAVULANATE (AUGMENTIN) 875-125 MG PER TABLET    Take 1 tablet by mouth 2 times daily for 7 days    HYDROCODONE-ACETAMINOPHEN (NORCO) 5-325 MG PER TABLET    Take 1 tablet by mouth every 6 hours as needed for Pain for up to 3 days. Intended supply: 3 days.  Take lowest dose possible

## 2021-01-27 NOTE — PROGRESS NOTES
CLINICAL PHARMACY NOTE: MEDS TO [address] Select Patient?: Yes  Total # of Prescriptions Filled: 4   The following medications were delivered to the patient:  · Naproxen 500 mg  · Ondansetron 4 mg ODT  · Augmentin 875 mg  · Norco 5/325 mg  Total # of Interventions Completed: 0  Time Spent (min): 30    Additional Documentation:

## 2021-01-30 LAB
ORGANISM: ABNORMAL
ORGANISM: ABNORMAL
URINE CULTURE, ROUTINE: ABNORMAL

## 2021-02-08 ENCOUNTER — OFFICE VISIT (OUTPATIENT)
Dept: PSYCHIATRY | Age: 31
End: 2021-02-08
Payer: COMMERCIAL

## 2021-02-08 DIAGNOSIS — F31.81 BIPOLAR 2 DISORDER (HCC): Primary | ICD-10-CM

## 2021-02-08 PROCEDURE — 90837 PSYTX W PT 60 MINUTES: CPT | Performed by: SOCIAL WORKER
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
## 2021-02-16 ENCOUNTER — TELEPHONE (OUTPATIENT)
Dept: PSYCHIATRY | Age: 31
End: 2021-02-16

## 2021-02-17 NOTE — TELEPHONE ENCOUNTER
Call placed to patient, spoke with patient informing pt of appt for 2/17/21 cancelled r/t weather conditions, appt would be rescheduled.

## 2021-02-24 ENCOUNTER — TELEPHONE (OUTPATIENT)
Dept: PSYCHIATRY | Age: 31
End: 2021-02-24

## 2021-02-24 NOTE — TELEPHONE ENCOUNTER
Call placed to patient to reschedule appt with Dr. Loretha Cheadle, unable to leave VM at this time.

## 2021-02-26 RX ORDER — QUETIAPINE FUMARATE 25 MG/1
TABLET, FILM COATED ORAL
Qty: 30 TABLET | Refills: 3 | Status: SHIPPED | OUTPATIENT
Start: 2021-02-26 | End: 2021-03-03 | Stop reason: SDUPTHER
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
## 2021-03-03 ENCOUNTER — OFFICE VISIT (OUTPATIENT)
Dept: PSYCHIATRY | Age: 31
End: 2021-03-03
Payer: COMMERCIAL

## 2021-03-03 VITALS
WEIGHT: 145.2 LBS | TEMPERATURE: 97.9 F | BODY MASS INDEX: 23.44 KG/M2 | HEART RATE: 89 BPM | SYSTOLIC BLOOD PRESSURE: 101 MMHG | DIASTOLIC BLOOD PRESSURE: 62 MMHG | OXYGEN SATURATION: 99 %

## 2021-03-03 DIAGNOSIS — F43.12 CHRONIC POST-TRAUMATIC STRESS DISORDER (PTSD): ICD-10-CM

## 2021-03-03 DIAGNOSIS — F41.0 GENERALIZED ANXIETY DISORDER WITH PANIC ATTACKS: ICD-10-CM

## 2021-03-03 DIAGNOSIS — F41.1 GENERALIZED ANXIETY DISORDER WITH PANIC ATTACKS: ICD-10-CM

## 2021-03-03 DIAGNOSIS — F31.81 BIPOLAR 2 DISORDER (HCC): Primary | ICD-10-CM

## 2021-03-03 PROCEDURE — 99214 OFFICE O/P EST MOD 30 MIN: CPT | Performed by: PSYCHIATRY & NEUROLOGY

## 2021-03-03 RX ORDER — QUETIAPINE FUMARATE 25 MG/1
25 TABLET, FILM COATED ORAL NIGHTLY
Qty: 30 TABLET | Refills: 5 | Status: SHIPPED | OUTPATIENT
Start: 2021-03-03 | End: 2021-06-30 | Stop reason: SDUPTHER

## 2021-03-03 NOTE — PROGRESS NOTES
3/3/2021 3:26 PM   Progress Note        Meggan Pedersen 1990      Chief Complaint   Patient presents with    Medication Check         Subjective:    54-year-old white female with history of bipolar disorder, anxiety, panic attacks, presents for follow-up. Currently kept on Lamictal, Wellbutrin, Seroquel, Prazosin. She is out of PRN Klonopin and is not willing to continue on it. Patient reports compliance. She had 2 episodes of hypotension during menstrual cycle when she bled heavily. Holding Prazosin. Bright affect today. Doing well overall. Stable mood. No PAs. Planning to move to a new house with kids. Continues to work. Planning to work on nursing degree. Children are doing well. Current Substance Use: Denies. Never had any issues with a stimulant, opioid or benzodiazepine abuse. Drank too much in the past.  Currently avoiding alcohol.     BP: /62   Pulse 89   Temp 97.9 °F (36.6 °C)   Wt 145 lb 3.2 oz (65.9 kg)   SpO2 99%   BMI 23.44 kg/m²       Review of Systems - 14 point review:  All negative:     Constitutional: (fevers, chills, night sweats, wt loss/gain, change in appetite, fatigue, somnolence)    HEENT: (ear pain or discharge, hearing loss, ear ringing, sinus pressure, nosebleed, nasal discharge, sore throat, oral sores, tooth pain, bleeding gums, hoarse voice, neck pain)      Cardiovascular: (HTN, chest pain, elevated cholesterol/lipids, palpitations, leg swelling, leg pain with walking)    Respiratory: (cough, wheezing, snoring, SOB with activity (dyspnea), SOB while lying flat (orthopnea), awakening with severe SOB (paroxysmal nocturnal dyspnea))    Gastrointestinal: (NVD, constipation, abdominal pain, bright red stools, black tarry stools, stool incontinence)     Genitourinary:  (pelvic pain, burning or frequency of urination, urinary urgency, blood in urine incomplete bladder emptying, urinary incontinence, STD; MEN: testicular pain or swelling, erectile dysfunction; WOMEN: days 11/23/20   Marcos Cartwright MD   buPROPion (WELLBUTRIN XL) 150 MG extended release tablet TAKE 1 TABLET BY MOUTH ONE TIME A DAY IN THE MORNING 6/1/20   CHRIS Ramon NP   lamoTRIgine (LAMICTAL) 150 MG tablet Take 1 tablet by mouth daily 6/1/20   CHRIS Ramon NP   Fouzia Tavarez (AIMOVIG) 140 MG/ML SOAJ Inject 140 mg into the skin every 30 days  Patient not taking: Reported on 11/23/2020 4/6/20   Marcos Cartwright MD       MSE:  Appearance: Appropriately groomed. Made good eye contact. Behavior: Calm, cooperative. No psychomotor retardation/agitation appreciated. Gait unremarkable. No abnormal movements or tremor. Speech: Normal in tone, volume, and quality. No slurring, dysarthria or pressured speech noted. Mood: \"Good\"   Affect: Mood congruent. Thought Process: Appears linear, logical and goal oriented. Causality appears intact. Thought Content: Denies active suicidal and homicidal ideations. No overt delusions or paranoia appreciated. Perceptions: Denies auditory or visual hallucinations at present time. Not responding to internal stimuli. Concentration: Intact. Orientation: to person, place, date, and situation. Language: Intact. Fund of information: Intact. Memory: Recent and remote appear intact. Impulsivity: Limited. Neurovegitative: Improved appetite and sleep. Insight: Fair. Judgment: Fair.       Lab Results   Component Value Date     01/27/2021    K 3.7 01/27/2021     01/27/2021    CO2 19 (L) 01/27/2021    BUN 10 01/27/2021    CREATININE 0.6 01/27/2021    GLUCOSE 116 (H) 01/27/2021    CALCIUM 8.9 01/27/2021    PROT 6.7 01/27/2021    LABALBU 4.0 01/27/2021    BILITOT <0.2 01/27/2021    ALKPHOS 72 01/27/2021    AST 10 01/27/2021    ALT 7 01/27/2021    LABGLOM >60 01/27/2021    GFRAA >59 01/27/2021     Lab Results   Component Value Date     01/27/2021    K 3.7 01/27/2021     01/27/2021    CO2 19 01/27/2021    BUN 10 01/27/2021 CREATININE 0.6 01/27/2021    GLUCOSE 116 01/27/2021    CALCIUM 8.9 01/27/2021      No results found for: CHOL  No results found for: TRIG  No results found for: HDL  No results found for: LDLCHOLESTEROL, LDLCALC  No results found for: LABVLDL, VLDL  No results found for: CHOLHDLRATIO  No results found for: LABA1C  No results found for: EAG  No results found for: TSHFT4, TSH  No results found for: VITD25    Assessments Administered:      Assessment:   1. Bipolar 2 disorder ([facility]    2. Chronic post-traumatic stress disorder (PTSD)    3. Generalized anxiety disorder with panic attacks         No evidence of acute suicidality, homicidality or psychosis observed today. Continues to improve. Plan:  1. Stop Prazosin due to hypotension. Stop Klonopin. The risks, benefits, side effects, indications, contraindications, and adverse effects of the medications have been discussed. Yes.  2. The pt has verbalized understanding and has capacity to give informed consent. 3. The Elizabeth Couch report has been reviewed according to [facility] regulations. 4. Supportive therapy offered. Patient will continue seeing her therapist at the clinic. 5. Follow up: Return in about 3 months (around 6/3/2021). 6. The patient has been advised to call with any problems. 7. Controlled substance Treatment Plan: NA  8. The above listed medications have been continued, modifications in meds and other orders/labs as follows:      Orders Placed This Encounter   Medications    QUEtiapine (SEROQUEL) 25 MG tablet     Sig: Take 1 tablet by mouth nightly     Dispense:  30 tablet     Refill:  5      No orders of the defined types were placed in this encounter. 9. Additional comments: Consider checking thyroid function and vitamin B12 and D levels next time. 10.Over 50% of the total visit time of  30  minutes was spent on counseling and/or coordination of care of:                        1. Bipolar 2 disorder ([facility]    2.  Chronic post-traumatic stress

## 2021-04-16 ENCOUNTER — OFFICE VISIT (OUTPATIENT)
Dept: PSYCHIATRY | Age: 31
End: 2021-04-16
Payer: COMMERCIAL

## 2021-04-16 DIAGNOSIS — F31.81 BIPOLAR 2 DISORDER (HCC): Primary | ICD-10-CM

## 2021-04-16 PROCEDURE — 90837 PSYTX W PT 60 MINUTES: CPT | Performed by: SOCIAL WORKER

## 2021-04-16 NOTE — PROGRESS NOTES
Therapy Progress Note  Abbie Gitelman MSW, [facility]  4/16/2021  8:46 AM  9:50 AM    Patient location  : [address] [place] [address]  [facility] location : [address] [place] [address]      Time spent with Patient: 64 minutes  This is patient's tenth  Therapy appointment. Reason for Consult:  depression and anxiety  Referring Provider: No referring provider defined for this encounter. Jomar Everett ,a 27 y.o. female, for initial evaluation visit. Pt provided informed consent for the behavioral health program. Discussed with patient model of service to include the limits of confidentiality (i.e. abuse reporting, suicide intervention, etc.) and short-term intervention focused approach. Discussed no show and late cancellation policy. Pt indicated understanding. S:  Pt reported relationship with  has improved,  has begun to address his mental health issues. Pt reported struggles with self-care, setting boundaries, and finishing her new home remodel. Pt reported she is being pressured by her  for a date she is will move back in to their home. Discussed healthy boundaries, self-care, and healthy relationships. Pt reported she would like to continue the 4 weeks appointments and verbalized she would call sooner if needed. Pt denies Suicidal Ideations, Homicidal Ideation, Auditory Hallucinations, Visual Hallucinations, Tactical Hallucinations.     MSE:    Appearance    alert, cooperative, mild distress  Appetite normal  Sleep disturbance Yes  Fatigue No  Loss of pleasure No  Impulsive behavior No  Speech    normal rate and normal volume  Mood    Neutral   Affect    normal affect  Thought Content    intact  Thought Process    goal directed  Associations    logical connections  Insight    Fair  Judgment    Intact  Orientation    oriented to person, place, time, and general circumstances  Memory    recent and remote memory intact  Attention/Concentration    intact  Morbid ideation No  Suicide Assessment no suicidal ideation      History:  Social History     Socioeconomic History    Marital status:      Spouse name: Justin Dallas Number of children: 2    Years of education: 15    Highest education level: Some college, no degree   Occupational History    Occupation: Behavioral Tech      Comment: [employer]   Social Needs    Financial resource strain: Not on file    Food insecurity     Worry: Not on file     Inability: Not on file   [employer] needs     Medical: Not on file     Non-medical: Not on file   Tobacco Use    Smoking status: Current Every Day Smoker     Packs/day: 1.00     Years: 6.00     Pack years: 6.00     Types: Cigarettes    Smokeless tobacco: Never Used   Substance and Sexual Activity    Alcohol use: Yes     Frequency: Monthly or less     Drinks per session: 1 or 2     Binge frequency: Less than monthly     Comment: rarely    Drug use: No    Sexual activity: Yes     Comment: tubal ligation   Lifestyle    Physical activity     Days per week: Not on file     Minutes per session: Not on file    Stress: Not on file   Relationships    Social connections     Talks on phone: Not on file     Gets together: Not on file     Attends Anabaptism service: Not on file     Active member of club or organization: Not on file     Attends meetings of clubs or organizations: Not on file     Relationship status: Not on file    Intimate partner violence     Fear of current or ex partner: Not on file     Emotionally abused: Not on file     Physically abused: Not on file     Forced sexual activity: Not on file   Other Topics Concern    Not on file   Social History Narrative    PREVIOUS MEDICATION TRIALS    Lamictal    Celexa (says it made her worse)    Trazodone        PREVIOUS PSYCHIATRIC HISTORY    She says that she has been treated for anxiety/depression off and on since age 20.              FAMILY PSYCHIATRIC HISTORY    Father, PTSD (combat ), alcoholic, rages (has been sober for the last 6 yrs)    Paternal grandparents are alcoholics    Maternal grandfather, alcoholic    Brother, takes Paxil (doesn't know)    Son, ADHD, (possibly ODD, possibly Bipolar)    Two cousins, bipolar disorder    Mother, anxiety, depression, insomnia, mood swings        PREVIOUS SUICIDE ATTEMPTS: Once when she was teenager, [employer] year in high school, (cut herself). She says that she called a friend to come and help her clean it up and she didn't report it or say anything to anyone else about it. History of cutting (has not done this in a couple of years, says she has a wonderful  who helps her with this).         INPATIENT HOSPITALIZATIONS: denies        REHABILITATION:denies       Medications:   Current Outpatient Medications   Medication Sig Dispense Refill    QUEtiapine (SEROQUEL) 25 MG tablet Take 1 tablet by mouth nightly 30 tablet 5    ondansetron (ZOFRAN ODT) 4 MG disintegrating tablet Take 1 tablet by mouth every 8 hours as needed for Nausea or Vomiting 30 tablet 0    naproxen (NAPROSYN) 500 MG tablet Take 1 tablet by mouth 2 times daily (with meals) 60 tablet 0    topiramate (TOPAMAX) 100 MG tablet TAKE 1 TABLET BY MOUTH TWO TIMES A DAY 60 tablet 5    lamoTRIgine (LAMICTAL) 25 MG tablet Take 1 tablet by mouth daily 30 tablet 1    prazosin (MINIPRESS) 1 MG capsule Take 1 capsule by mouth nightly 30 capsule 1    butalbital-APAP-caffeine -40 MG CAPS per capsule Take 1 capsule by mouth every 6 hours as needed for Headaches 40 capsule 1    Fremanezumab-vfrm (AJOVY) 225 MG/1.5ML SOAJ Inject 225 mg into the skin every 30 days 1 pen 5    buPROPion (WELLBUTRIN XL) 150 MG extended release tablet TAKE 1 TABLET BY MOUTH ONE TIME A DAY IN THE MORNING 90 tablet 1    lamoTRIgine (LAMICTAL) 150 MG tablet Take 1 tablet by mouth daily 90 tablet 1    Erenumab-aooe (AIMOVIG) 140 MG/ML SOAJ Inject 140 mg into the skin every 30 days (Patient not taking: Reported on 11/23/2020) 3 pen 1     No current facility-administered medications for this visit. Social History:   Social History     Socioeconomic History    Marital status:      Spouse name: Desiree Simpson Number of children: 2    Years of education: 15    Highest education level: Some college, no degree   Occupational History    Occupation: Behavioral Tech      Comment: [employer]   Social Needs    Financial resource strain: Not on file    Food insecurity     Worry: Not on file     Inability: Not on file   [employer] needs     Medical: Not on file     Non-medical: Not on file   Tobacco Use    Smoking status: Current Every Day Smoker     Packs/day: 1.00     Years: 6.00     Pack years: 6.00     Types: Cigarettes    Smokeless tobacco: Never Used   Substance and Sexual Activity    Alcohol use: Yes     Frequency: Monthly or less     Drinks per session: 1 or 2     Binge frequency: Less than monthly     Comment: rarely    Drug use: No    Sexual activity: Yes     Comment: tubal ligation   Lifestyle    Physical activity     Days per week: Not on file     Minutes per session: Not on file    Stress: Not on file   Relationships    Social connections     Talks on phone: Not on file     Gets together: Not on file     Attends Sikhism service: Not on file     Active member of club or organization: Not on file     Attends meetings of clubs or organizations: Not on file     Relationship status: Not on file    Intimate partner violence     Fear of current or ex partner: Not on file     Emotionally abused: Not on file     Physically abused: Not on file     Forced sexual activity: Not on file   Other Topics Concern    Not on file   Social History Narrative    PREVIOUS MEDICATION TRIALS    Lamictal    Celexa (says it made her worse)    Trazodone        PREVIOUS PSYCHIATRIC HISTORY    She says that she has been treated for anxiety/depression off and on since age 20.              FAMILY PSYCHIATRIC HISTORY    Father, PTSD (combat ), alcoholic, rages (has been sober for the last 6 yrs)    Paternal grandparents are alcoholics    Maternal grandfather, alcoholic    Brother, takes Paxil (doesn't know)    Son, ADHD, (possibly ODD, possibly Bipolar)    Two cousins, bipolar disorder    Mother, anxiety, depression, insomnia, mood swings        PREVIOUS SUICIDE ATTEMPTS: Once when she was teenager, [employer] year in high school, (cut herself). She says that she called a friend to come and help her clean it up and she didn't report it or say anything to anyone else about it. History of cutting (has not done this in a couple of years, says she has a wonderful  who helps her with this). INPATIENT HOSPITALIZATIONS: denies        REHABILITATION:denies       TOBACCO:   reports that she has been smoking cigarettes. She has a 6.00 pack-year smoking history. She has never used smokeless tobacco.  ETOH:   reports current alcohol use. Family History:   Family History   Problem Relation Age of Onset    Diabetes Maternal Aunt     Heart Failure Maternal Grandmother     Diabetes Paternal Grandfather     High Blood Pressure Paternal Grandfather        Diagnosis:    The encounter diagnosis was Bipolar 2 disorder ([facility] [address] Diagnosis Date    Anxiety     Bipolar 2 disorder ([facility] [address] 1/25/2021    Headache(784.0)     Headache, paroxysmal hemicrania, episodic     Migraine     Tension headache      no problems    Plan:  1. Continue medication management  2. CBT to target cognitive distortions  3.  Discuss therapeutic goals    Pt interventions:  Trained in strategies for increasing balanced thinking, Provided education, Discussed self-care (sleep, nutrition, rewarding activities, social support, exercise), Supportive techniques, Emphasized self-care as important for managing overall health and Identified maladaptive thoughts      Roberto Graves MSW, LCSW

## 2021-04-16 NOTE — PATIENT INSTRUCTIONS
Call [facility] for son:   [address], [place], [place]  [phone]                            What are Personal Boundaries? © 2016 [employer] [facility] 1 Provided by [employer]. com      Personal boundaries are the limits and rules we set for ourselves within relationships. A person with healthy boundaries can say no to others when they want to, but they are also comfortable opening themselves up to intimacy and close relationships. A person who always keeps others at a distance (whether emotionally, physically, or  otherwise) is said to have rigid boundaries. Alternatively, someone who tends to get too  involved with others has porous boundaries. Common traits of rigid, porous, and healthy boundaries    Rigid Boundaries    Avoids intimacy and close relationships. Unlikely to ask for help. Has few close relationships. Very protective of personal information. May seem detached, even with romantic partners. Keeps others at a distance to avoid the possibility of Rejection. Porous Boundaries  Overshares personal information. Difficulty saying no to the requests of others. Overinvolved with others problems. Dependent on the opinions of others. Accepting of abuse or disrespect. Fears rejection if they do not comply with others. Healthy Boundaries  Values own opinions. Doesnt compromise values for others. Shares personal information in an appropriate way (does not  over or under share). Knows personal wants and needs, and can communicate them. Accepting when others say no to them. Most people have a mix of different boundary types. For example,  someone could have healthy boundaries at work, porous boundaries in  romantic relationships, and a mix of all three types with their family. One  size does not fit all! The appropriateness of boundaries depends heavily on setting.  Whats  appropriate to say when youre out with friends might not be appropriate  when

## 2021-04-26 ENCOUNTER — NURSE TRIAGE (OUTPATIENT)
Dept: OTHER | Facility: CLINIC | Age: 31
End: 2021-04-26

## 2021-04-26 NOTE — TELEPHONE ENCOUNTER
Reason for Disposition   Numbness in one foot (i.e., loss of sensation)   Swollen foot (Exceptions: localized bump from bunions, calluses, insect bite, sting)    Answer Assessment - Initial Assessment Questions  1. ONSET: \"When did the pain start? \"       Yesterday     2. LOCATION: \"Where is the pain located? \"       Left ankle    3. PAIN: \"How bad is the pain? \"    (Scale 1-10; or mild, moderate, severe)    -  MILD (1-3): doesn't interfere with normal activities     -  MODERATE (4-7): interferes with normal activities (e.g., work or school) or awakens from sleep, limping     -  SEVERE (8-10): excruciating pain, unable to do any normal activities, unable to walk      2/10 when not on it, when putting weight 6-7/10    4. WORK OR EXERCISE: \"Has there been any recent work or exercise that involved this part of the body? \"      Unsure, thought she may have bumped     5. CAUSE: \"What do you think is causing the foot pain? \"      Unsure     6. OTHER SYMPTOMS: \"Do you have any other symptoms? \" (e.g., leg pain, rash, fever, numbness)      Numbness in toes     7. PREGNANCY: \"Is there any chance you are pregnant? \" \"When was your last menstrual period? \"      No    Protocols used: FOOT PAIN-ADULT-[place]    Brief description of triage: foot pain, ankle swelling and occasional numbness in toes starting yesterday. Triage indicates for patient to be seen today. I recommended U[facility] if unable to get into PCP. Care advice provided, patient verbalizes understanding; denies any other questions or concerns; instructed to call back for any new or worsening symptoms. Attention Provider: Thank you for allowing me to participate in the care of your patient. The patient was connected to triage in response to symptoms provided. Please do not respond through this encounter as the response is not directed to a shared pool.

## 2021-04-28 ENCOUNTER — OFFICE VISIT (OUTPATIENT)
Dept: PSYCHIATRY | Age: 31
End: 2021-04-28
Payer: COMMERCIAL

## 2021-04-28 VITALS
HEIGHT: 66 IN | SYSTOLIC BLOOD PRESSURE: 107 MMHG | OXYGEN SATURATION: 97 % | WEIGHT: 147.4 LBS | BODY MASS INDEX: 23.69 KG/M2 | HEART RATE: 79 BPM | TEMPERATURE: 98.2 F | DIASTOLIC BLOOD PRESSURE: 67 MMHG

## 2021-04-28 DIAGNOSIS — F51.05 INSOMNIA DUE TO OTHER MENTAL DISORDER: ICD-10-CM

## 2021-04-28 DIAGNOSIS — F43.12 CHRONIC POST-TRAUMATIC STRESS DISORDER (PTSD): ICD-10-CM

## 2021-04-28 DIAGNOSIS — F99 INSOMNIA DUE TO OTHER MENTAL DISORDER: ICD-10-CM

## 2021-04-28 DIAGNOSIS — F31.81 BIPOLAR II DISORDER, MILD, DEPRESSED, WITH ANXIOUS DISTRESS (HCC): Primary | ICD-10-CM

## 2021-04-28 DIAGNOSIS — F41.0 GENERALIZED ANXIETY DISORDER WITH PANIC ATTACKS: ICD-10-CM

## 2021-04-28 DIAGNOSIS — F41.1 GENERALIZED ANXIETY DISORDER WITH PANIC ATTACKS: ICD-10-CM

## 2021-04-28 PROCEDURE — 99214 OFFICE O/P EST MOD 30 MIN: CPT | Performed by: PSYCHIATRY & NEUROLOGY

## 2021-04-28 NOTE — PROGRESS NOTES
4/28/2021 1:41 PM   Progress Note        Garrison Phillips 1990      Chief Complaint   Patient presents with    Medication Check         Subjective:    28-year-old white female with history of bipolar disorder, anxiety, panic attacks, presents for follow-up. Currently kept on Lamictal, Wellbutrin, Seroquel. Prazosin was stopped due to hypotension. Patient reports compliance. Bright affect today. Doing well overall, however, occasional mood swings. She would like to increase Lamictal.  Some issues with parents and moving causing anxiety. Also had to put her beloved dog down. Had a few PAs at work. Sleeping ok. Good appetite. Continues to work. Planning to work on nursing degree. Children are doing well. Current Substance Use: Denies. Never had any issues with a stimulant, opioid or benzodiazepine abuse. Drank too much in the past.  Currently avoiding alcohol.     BP: /67   Pulse 79   Temp 98.2 °F (36.8 °C)   Ht 5' 6\" (1.676 m)   Wt 147 lb 6.4 oz (66.9 kg)   SpO2 97%   BMI 23.79 kg/m²       Review of Systems - 14 point review:  All negative:     Constitutional: (fevers, chills, night sweats, wt loss/gain, change in appetite, fatigue, somnolence)    HEENT: (ear pain or discharge, hearing loss, ear ringing, sinus pressure, nosebleed, nasal discharge, sore throat, oral sores, tooth pain, bleeding gums, hoarse voice, neck pain)      Cardiovascular: (HTN, chest pain, elevated cholesterol/lipids, palpitations, leg swelling, leg pain with walking)    Respiratory: (cough, wheezing, snoring, SOB with activity (dyspnea), SOB while lying flat (orthopnea), awakening with severe SOB (paroxysmal nocturnal dyspnea))    Gastrointestinal: (NVD, constipation, abdominal pain, bright red stools, black tarry stools, stool incontinence)     Genitourinary:  (pelvic pain, burning or frequency of urination, urinary urgency, blood in urine incomplete bladder emptying, urinary incontinence, STD; MEN: testicular pain or swelling, erectile dysfunction; WOMEN: LMP, heavy menstrual bleeding (menorrhagia), irregular periods, postmenopausal bleeding, menstrual pain (dymenorrhea, vaginal discharge)    Musculoskeletal: (bone pain/fracture, joint pain or swelling, musle pain)    Integumentary: (rashes, acne, non-healing sores, itching, breast lumps, breast pain, nipple discharge, hair loss)    Neurologic: (HA, muscle weakness, paresthesias (numbness, coldness, crawling or prickling), memory loss, seizure, dizziness)    Psychiatric:  (anxiety, sadness, irritability/anger, insomnia, suicidality)    Endocrine: (heat or cold intolerance, excessive thirst (polydipsia), excessive hunger (polyphagia))    Immune/Allergic: (hives, seasonal or environmental allergies, HIV exposure)    Hematologic/Lymphatic: (lymph node enlargement, easy bleeding or bruising)    History obtained via chart review and patient    PCP is No primary care provider on file. Current Meds:    Prior to Admission medications    Medication Sig Start Date End Date Taking?  Authorizing Provider   QUEtiapine (SEROQUEL) 25 MG tablet Take 1 tablet by mouth nightly 3/3/21 4/2/21  Jordi Hines MD   ondansetron (ZOFRAN ODT) 4 MG disintegrating tablet Take 1 tablet by mouth every 8 hours as needed for Nausea or Vomiting 1/27/21   Wendy Marte MD   naproxen (NAPROSYN) 500 MG tablet Take 1 tablet by mouth 2 times daily (with meals) 1/27/21   Wendy Marte MD   topiramate (TOPAMAX) 100 MG tablet TAKE 1 TABLET BY MOUTH TWO TIMES A DAY 1/15/21   Ladarius Dickerson MD   lamoTRIgine (LAMICTAL) 25 MG tablet Take 1 tablet by mouth daily 12/16/20 1/15/21  Jordi Hines MD   butalbital-APAP-caffeine -40 MG CAPS per capsule Take 1 capsule by mouth every 6 hours as needed for Headaches 11/24/20   Ladarius Dickerson MD   Fremanezumab-vfrm (AJOVY) 225 MG/1.5ML SOAJ Inject 225 mg into the skin every 30 days 11/23/20   Ladarius Dickerson MD   buPROPion [facility] 01/27/2021      No results found for: CHOL  No results found for: TRIG  No results found for: HDL  No results found for: LDLCHOLESTEROL, LDLCALC  No results found for: LABVLDL, VLDL  No results found for: CHOLHDLRATIO  No results found for: LABA1C  No results found for: EAG  No results found for: TSHFT4, TSH  No results found for: VITD25    Assessments Administered:      Assessment:   1. Bipolar II disorder, mild, depressed, with anxious distress ([address]    2. Generalized anxiety disorder with panic attacks    3. Chronic post-traumatic stress disorder (PTSD)    4. Insomnia due to other mental disorder         No evidence of acute suicidality, homicidality or psychosis observed today. Doing well. Plan:  1. Increase Lamictal to 200 mg daily. The risks, benefits, side effects, indications, contraindications, and adverse effects of the medications have been discussed. Yes.  2. The pt has verbalized understanding and has capacity to give informed consent. 3. The Cheryl Briceno report has been reviewed according to [facility] regulations. 4. Supportive therapy offered. Patient will continue seeing her therapist at the clinic. 5. Follow up: Return in about 2 months (around 6/28/2021). 6. The patient has been advised to call with any problems. 7. Controlled substance Treatment Plan: NA  8. The above listed medications have been continued, modifications in meds and other orders/labs as follows: No orders of the defined types were placed in this encounter. No orders of the defined types were placed in this encounter. 9. Additional comments: Consider checking thyroid function and vitamin B12 and D levels next time. 10.Over 50% of the total visit time of  30  minutes was spent on counseling and/or coordination of care of:                        1. Bipolar II disorder, mild, depressed, with anxious distress ([facility] [address]    2. Generalized anxiety disorder with panic attacks    3.  Chronic post-traumatic stress disorder (PTSD)    4.  Insomnia due to other mental disorder        Giorgio Noyola MD

## 2021-05-03 ENCOUNTER — OFFICE VISIT (OUTPATIENT)
Dept: FAMILY MEDICINE CLINIC | Age: 31
End: 2021-05-03
Payer: COMMERCIAL

## 2021-05-03 VITALS
HEART RATE: 90 BPM | WEIGHT: 152 LBS | TEMPERATURE: 99 F | OXYGEN SATURATION: 98 % | HEIGHT: 65 IN | DIASTOLIC BLOOD PRESSURE: 68 MMHG | SYSTOLIC BLOOD PRESSURE: 110 MMHG | BODY MASS INDEX: 25.33 KG/M2

## 2021-05-03 DIAGNOSIS — M26.643 ARTHRITIS OF BOTH TEMPOROMANDIBULAR JOINTS: ICD-10-CM

## 2021-05-03 DIAGNOSIS — L30.9 DERMATITIS: ICD-10-CM

## 2021-05-03 DIAGNOSIS — N83.202 CYST OF LEFT OVARY: Primary | ICD-10-CM

## 2021-05-03 PROBLEM — N83.209 OVARIAN CYST: Status: ACTIVE | Noted: 2021-05-03

## 2021-05-03 PROCEDURE — 99204 OFFICE O/P NEW MOD 45 MIN: CPT | Performed by: FAMILY MEDICINE

## 2021-05-03 RX ORDER — TRIAMCINOLONE ACETONIDE 0.25 MG/G
CREAM TOPICAL
Qty: 80 G | Refills: 0 | Status: SHIPPED | OUTPATIENT
Start: 2021-05-03 | End: 2021-09-13

## 2021-05-03 ASSESSMENT — PATIENT HEALTH QUESTIONNAIRE - PHQ9
SUM OF ALL RESPONSES TO PHQ QUESTIONS 1-9: 0
SUM OF ALL RESPONSES TO PHQ QUESTIONS 1-9: 0

## 2021-05-03 NOTE — PATIENT INSTRUCTIONS
Patient Education        Temporomandibular Disorder: Care Instructions  Overview     Temporomandibular disorders (TMDs) are problems with the muscles and joints that connect your jaw to your skull. The disorders cause pain when you talk, chew, swallow, or yawn. You may feel this pain on one or both sides. TMDs are often caused by tight jaw muscles. The tightness can be caused by clenching or grinding your teeth. This may happen when you have a lot of stress in your life. If you lower your stress, you may be able to stop clenching or grinding your teeth. This will help relax your jaw and reduce your pain. Your doctor may suggest a dental splint. Splints can help reduce teeth grinding and clenching. You may also be able to do some things at home to feel better. But if none of this works, your doctor may prescribe medicine to help relax your muscles and control the pain. Follow-up care is a key part of your treatment and safety. Be sure to make and go to all appointments, and call your doctor if you are having problems. It's also a good idea to know your test results and keep a list of the medicines you take. How can you care for yourself at home? · Put either an ice pack or a warm, moist cloth on your jaw for 15 minutes several times a day. You can try switching back and forth between moist heat and cold. · Make eating easy on your jaw. Choose softer foods that are easy to chew like eggs, yogurt, or soup. Avoid hard foods that cause your jaws to work very hard. Try cutting your food into small pieces. And if your jaw gets too painful to chew, or if it locks, you may need to puree your food for a while. · To relax your jaw, repeat this exercise for a few minutes every morning and evening. Watch yourself in a mirror. Gently open and close your mouth. Move your jaw straight up and down. But don't do this if it makes your pain worse. · Manage stress.  You may be clenching or tightening your muscles when you are raised rash. In severe cases, the rash develops clear fluidfilled blisters. The rash is not contagious. People with this condition seem to have very sensitive immune systems that are likely to react to things that cause allergies. The immune system is the body's way of fighting infection. There is no cure for atopic dermatitis, but you may be able to control it with care at home. Follow-up care is a key part of your treatment and safety. Be sure to make and go to all appointments, and call your doctor if you are having problems. It's also a good idea to know your test results and keep a list of the medicines you take. How can you care for yourself at home? · Use moisturizer at least twice a day. · If your doctor prescribes a cream, use it as directed. If your doctor prescribes other medicine, take it exactly as directed. · Wash the affected area with water only. Soap can make dryness and itching worse. Pat dry. · Apply a moisturizer after bathing. Use a cream such as Lubriderm, Moisturel, or Cetaphil that does not irritate the skin or cause a rash. Apply the cream while your skin is still damp after lightly drying with a towel. · Use cold, wet cloths to reduce itching. · Keep cool, and stay out of the sun. · If itching affects your normal activities, an over-the-counter antihistamine, such as diphenhydramine (Benadryl) or loratadine (Claritin) may help. Read and follow all instructions on the label. When should you call for help? Call your doctor now or seek immediate medical care if:    · Your rash gets worse and you have a fever.     · You have new blisters or bruises, or the rash spreads and looks like a sunburn.     · You have signs of infection, such as:  ? Increased pain, swelling, warmth, or redness. ? Red streaks leading from the rash. ? Pus draining from the rash. ? A fever.     · You have crusting or oozing sores.     · You have joint aches or body aches along with your rash.    Watch

## 2021-05-03 NOTE — PROGRESS NOTES
[address] [address]  [address] [address]  Dept: [phone]  Dept Fax: [phone]  Loc: [phone]    Subjective:     Ashleigh Matthews is a 27 y.o. female who presents today for her medical conditions/complaints as noted below. Ashleigh Matthews is c/o of Established New Doctor        HPI:   Patient presents to [facility] [place] [facility]. Her  and children are also my patients. She sees Neurology and Psychiatry here for migraines and bipolar/PTSD, notes reviewed. Main concern today is ovarian cyst.  She went to the ED on 1/27 for abdominal pain/pelvic pain, notes reviewed. She had a CT and TVUS scans which showed a greater than 3 cm likely left ovarian cyst and 2 week follow-up was recommended. She is also due for well woman exam.    Her other main concern is intermittent rash and pruritus of her arms. This seems to be brought on by exposure to sunlight. She previously saw Dermatology in [facility] but didn't feel like they added much. Also she has frequent left ear pain. She also admits to clicking and popping of her jaw. No difficulty hearing. PHQ Scores 5/3/2021 1/25/2021   PHQ2 Score 0 1   PHQ9 Score 0 4     Interpretation of Total Score Depression Severity: 1-4 = Minimal depression, 5-9 = Mild depression, 10-14 = Moderate depression, 15-19 = Moderately severe depression, 20-27 = Severe depression     BRETT 7 SCORE 1/25/2021   BRETT-7 Total Score 2     Interpretation of BRETT-7 score: 5-9 = mild anxiety, 10-14 = moderate anxiety, 15+ = severe anxiety. Recommend referral to behavioral health for scores 10 or greater.      Past Medical History:   Diagnosis Date    Anxiety     Bipolar 2 disorder ([address] 01/25/2021    Headache(784.0)     Headache, paroxysmal hemicrania, episodic     History of posttraumatic stress disorder (PTSD) 12/01/2020    Migraine     Tension headache      Past Surgical History:   Procedure Laterality Date    ADENOIDECTOMY      CHOLECYSTECTOMY, LAPAROSCOPIC  11/5/12    TUBAL LIGATION         Family History   Problem Relation Age of Onset    Diabetes Maternal Aunt     Heart Failure Maternal Grandmother     Diabetes Paternal Grandfather     High Blood Pressure Paternal Grandfather     Kidney stones Mother     Hypertension Father        Social History     Tobacco Use    Smoking status: Current Every Day Smoker     Packs/day: 1.00     Types: Cigarettes     Start date: 2005    Smokeless tobacco: Never Used   Substance Use Topics    Alcohol use: Yes     Frequency: Monthly or less     Drinks per session: 1 or 2     Binge frequency: Less than monthly     Comment: rarely      Current Outpatient Medications   Medication Sig Dispense Refill    triamcinolone (KENALOG) 0.025 % cream Apply topically 2 times daily. 80 g 0    QUEtiapine (SEROQUEL) 25 MG tablet Take 1 tablet by mouth nightly 30 tablet 5    topiramate (TOPAMAX) 100 MG tablet TAKE 1 TABLET BY MOUTH TWO TIMES A DAY 60 tablet 5    buPROPion (WELLBUTRIN XL) 150 MG extended release tablet TAKE 1 TABLET BY MOUTH ONE TIME A DAY IN THE MORNING 90 tablet 1    lamoTRIgine (LAMICTAL) 150 MG tablet Take 1 tablet by mouth daily (Patient taking differently: Take 200 mg by mouth daily ) 90 tablet 1    AIMOVIG 140 MG/ML SOAJ INJECT 140 MG INTO THE SKIN EVERY 30 DAYS 3 pen 3    butalbital-APAP-caffeine -40 MG CAPS per capsule Take 1 capsule by mouth every 6 hours as needed for Headaches (Patient not taking: Reported on 5/3/2021) 40 capsule 1     No current facility-administered medications for this visit. Allergies   Allergen Reactions    Morphine Other (See Comments)     Produces headaches for about 4-5 days. Review of Systems   Constitutional: Negative for chills and fever. HENT: Negative for facial swelling and mouth sores. Eyes: Negative for discharge and itching. Respiratory: Negative for apnea and stridor.     Cardiovascular: Negative for chest pain and palpitations. Gastrointestinal: Negative for nausea and vomiting. Endocrine: Negative for cold intolerance and heat intolerance. Genitourinary: Negative for frequency and urgency. Musculoskeletal: Negative for arthralgias and back pain. Skin: Positive for rash. Negative for color change. See HPI for visit specific review of symptoms. All others negative      Objective:   /68   Pulse 90   Temp 99 °F (37.2 °C)   Ht 5' 5\" (1.651 m)   Wt 152 lb (68.9 kg)   SpO2 98%   BMI 25.29 kg/m²   Physical Exam  Vitals signs reviewed. Constitutional:       Appearance: She is well-developed. HENT:      Head: Normocephalic. Jaw: Jaw movement pain: TMJ b/l. Eyes:      Conjunctiva/sclera: Conjunctivae normal.      Pupils: Pupils are equal, round, and reactive to light. Neck:      Musculoskeletal: Normal range of motion and neck supple. Cardiovascular:      Rate and Rhythm: Normal rate and regular rhythm. Heart sounds: Normal heart sounds. Pulmonary:      Effort: Pulmonary effort is normal. No respiratory distress. Breath sounds: Normal breath sounds. Abdominal:      General: Bowel sounds are normal. There is no distension. Palpations: Abdomen is soft. Tenderness: There is no abdominal tenderness. Musculoskeletal: Normal range of motion. Skin:     General: Skin is warm and dry. Capillary Refill: Capillary refill takes less than 2 seconds. Findings: Rash present. Neurological:      Mental Status: She is alert and oriented to person, place, and time. Cranial Nerves: No cranial nerve deficit. Psychiatric:         Behavior: Behavior normal.           Lab Review   No results found for this or any previous visit (from the past 672 hour(s)). Examination. US NON OB TRANSVAGINAL 1/27/2021 6:15 AM   History: Severe lower abdominal pain. Evaluation for ovarian torsion. The transvaginal sonography of the pelvis is performed.  There is no previous study for comparison. The correlation is made with CT scan of   the abdomen dated 5/11/2019. The uterus measures 7.3 x 4.1 cm. No intrinsic abnormality or a mass. The endometrium measures 3 mm in thickness. The right ovary measures 2.2 x 1.9 x 1.7 cm. No intrinsic abnormality. A normal blood flow. The left ovary measures 4.6 x 1.7 x 4 cm. There is normal blood flow. There is a hypoechoic nodule in the left ovary measuring 2.5 x 1.9 x   2.5 cm there is moderate thickening of the wall and minimally tender   debris. There is good through transmission.       Impression   A normal uterus and endometrium. A thick-walled cystic nodule in the left ovary. This may represent a   dominant follicle. A follow-up examination in 2 weeks may be obtained   to ensure stability/resolution. Signed by Dr Lianne Nova on 1/27/2021 8:01 AM             Assessment & Plan: The following diagnoses and conditions are stable with no further orders unless indicated:    Patient Active Problem List    Diagnosis Date Noted    Arthritis of both temporomandibular joints 05/09/2021     Overview Note:     Discussed preventive measures, info given      Dermatitis 05/09/2021     Overview Note:     Trial Kenalog for possible eczema      Ovarian cyst 05/03/2021    [facility] 01/25/2021    Chronic tension-type headache, intractable 11/19/2018    Chronic paroxysmal hemicrania, intractable 05/03/2018    Migraine         Jyoti Humphrey was seen today for established new doctor. Diagnoses and all orders for this visit:    Cyst of left ovary  -     US NON OB TRANSVAGINAL; Future    Arthritis of both temporomandibular joints    Dermatitis  -     triamcinolone (KENALOG) 0.025 % cream; Apply topically 2 times daily.         Health Maintenance   Topic Date Due    Varicella vaccine (1 of 2 - 2-dose childhood series) Never done    Pneumococcal 0-64 years Vaccine (1 of 1 - PPSV23) Never done    COVID-19 Vaccine (1)

## 2021-05-04 RX ORDER — ERENUMAB-AOOE 140 MG/ML
140 INJECTION, SOLUTION SUBCUTANEOUS
Qty: 3 PEN | Refills: 3 | Status: SHIPPED | OUTPATIENT
Start: 2021-05-04 | End: 2022-03-29

## 2021-05-06 ENCOUNTER — HOSPITAL ENCOUNTER (OUTPATIENT)
Dept: ULTRASOUND IMAGING | Age: 31
Discharge: HOME | End: 2021-05-06
Payer: COMMERCIAL

## 2021-05-06 DIAGNOSIS — N83.202 CYST OF LEFT OVARY: ICD-10-CM

## 2021-05-06 PROCEDURE — 76830 TRANSVAGINAL US NON-OB: CPT

## 2021-05-07 ENCOUNTER — TELEPHONE (OUTPATIENT)
Dept: FAMILY MEDICINE CLINIC | Age: 31
End: 2021-05-07

## 2021-05-07 DIAGNOSIS — N83.201 BILATERAL OVARIAN CYSTS: Primary | ICD-10-CM

## 2021-05-07 DIAGNOSIS — N83.202 BILATERAL OVARIAN CYSTS: Primary | ICD-10-CM

## 2021-05-07 DIAGNOSIS — R10.2 PELVIC PAIN: ICD-10-CM

## 2021-05-09 PROBLEM — L30.9 DERMATITIS: Status: ACTIVE | Noted: 2021-05-09

## 2021-05-09 PROBLEM — M26.643 ARTHRITIS OF BOTH TEMPOROMANDIBULAR JOINTS: Status: ACTIVE | Noted: 2021-05-09

## 2021-05-09 ASSESSMENT — ENCOUNTER SYMPTOMS
EYE DISCHARGE: 0
VOMITING: 0
APNEA: 0
COLOR CHANGE: 0
BACK PAIN: 0
FACIAL SWELLING: 0
NAUSEA: 0
STRIDOR: 0
EYE ITCHING: 0

## 2021-05-14 ENCOUNTER — TELEPHONE (OUTPATIENT)
Dept: PSYCHIATRY | Age: 31
End: 2021-05-14

## 2021-05-14 NOTE — TELEPHONE ENCOUNTER
Called pt for appointment reminder.   -Pt confirmed      Electronically signed by Trinity Alexandra MA on 5/14/2021 at 11:22 AM

## 2021-05-17 ENCOUNTER — OFFICE VISIT (OUTPATIENT)
Dept: PSYCHIATRY | Age: 31
End: 2021-05-17
Payer: COMMERCIAL

## 2021-05-17 DIAGNOSIS — F31.81 BIPOLAR 2 DISORDER (HCC): Primary | ICD-10-CM

## 2021-05-17 PROCEDURE — 90837 PSYTX W PT 60 MINUTES: CPT | Performed by: SOCIAL WORKER

## 2021-05-17 NOTE — PROGRESS NOTES
general circumstances  Memory    recent and remote memory intact  Attention/Concentration    intact  Morbid ideation No  Suicide Assessment    no suicidal ideation      History:  Social History     Socioeconomic History    Marital status:      Spouse name: Kaila Yu Number of children: 2    Years of education: 15    Highest education level: Some college, no degree   Occupational History    Occupation: Behavioral Tech      Comment: [employer]   Tobacco Use    Smoking status: Current Every Day Smoker     Packs/day: 1.00     Types: Cigarettes     Start date: 2005    Smokeless tobacco: Never Used   Vaping Use    Vaping Use: Never used   Substance and Sexual Activity    Alcohol use: Yes     Comment: rarely    Drug use: No    Sexual activity: Yes     Comment: tubal ligation   Other Topics Concern    Not on file   Social History Narrative    PREVIOUS MEDICATION TRIALS    Lamictal    Celexa (says it made her worse)    Trazodone        PREVIOUS PSYCHIATRIC HISTORY    She says that she has been treated for anxiety/depression off and on since age 20. FAMILY PSYCHIATRIC HISTORY    Father, PTSD (combat ), alcoholic, rages (has been sober for the last 6 yrs)    Paternal grandparents are alcoholics    Maternal grandfather, alcoholic    Brother, takes Paxil (doesn't know)    Son, ADHD, (possibly ODD, possibly Bipolar)    Two cousins, bipolar disorder    Mother, anxiety, depression, insomnia, mood swings        PREVIOUS SUICIDE ATTEMPTS: Once when she was teenager, [employer] year in high school, (cut herself). She says that she called a friend to come and help her clean it up and she didn't report it or say anything to anyone else about it. History of cutting (has not done this in a couple of years, says she has a wonderful  who helps her with this).         INPATIENT HOSPITALIZATIONS: denies        REHABILITATION:denies     Social Determinants of Health     Financial Resource Strain: no degree   Occupational History    Occupation: Behavioral Tech      Comment: [employer]   Tobacco Use    Smoking status: Current Every Day Smoker     Packs/day: 1.00     Types: Cigarettes     Start date: 2005    Smokeless tobacco: Never Used   Vaping Use    Vaping Use: Never used   Substance and Sexual Activity    Alcohol use: Yes     Comment: rarely    Drug use: No    Sexual activity: Yes     Comment: tubal ligation   Other Topics Concern    Not on file   Social History Narrative    PREVIOUS MEDICATION TRIALS    Lamictal    Celexa (says it made her worse)    Trazodone        PREVIOUS PSYCHIATRIC HISTORY    She says that she has been treated for anxiety/depression off and on since age 20. FAMILY PSYCHIATRIC HISTORY    Father, PTSD (combat ), alcoholic, rages (has been sober for the last 6 yrs)    Paternal grandparents are alcoholics    Maternal grandfather, alcoholic    Brother, takes Paxil (doesn't know)    Son, ADHD, (possibly ODD, possibly Bipolar)    Two cousins, bipolar disorder    Mother, anxiety, depression, insomnia, mood swings        PREVIOUS SUICIDE ATTEMPTS: Once when she was teenager, Arturo March year in high school, (cut herself). She says that she called a friend to come and help her clean it up and she didn't report it or say anything to anyone else about it. History of cutting (has not done this in a couple of years, says she has a wonderful  who helps her with this). INPATIENT HOSPITALIZATIONS: denies        REHABILITATION:denies     Social Determinants of Health     Financial Resource Strain:     Difficulty of Paying Living Expenses:    Food Insecurity:     Worried About Running Out of Food in the Last Year:     [address] in the Last Year:    Transportation Needs:     Lack of Transportation (Medical):      Lack of Transportation (Non-Medical):    Physical Activity:     Days of Exercise per Week:     Minutes of Exercise per Session:    Stress:  Feeling of Stress :    Social Connections:     Frequency of Communication with Friends and Family:     Frequency of Social Gatherings with Friends and Family:     Attends [employer] Services:     Active Member of Clubs or Organizations:     Attends Club or Organization Meetings:     Marital Status:    Intimate Partner Violence:     Fear of Current or Ex-Partner:     Emotionally Abused:     Physically Abused:     Sexually Abused:        TOBACCO:   reports that she has been smoking cigarettes. She started smoking about 16 years ago. She has been smoking about 1.00 pack per day. She has never used smokeless tobacco.  ETOH:   reports current alcohol use. Family History:   Family History   Problem Relation Age of Onset    Diabetes Maternal Aunt     Heart Failure Maternal Grandmother     Diabetes Paternal Grandfather     High Blood Pressure Paternal Grandfather     Kidney stones Mother     Hypertension Father        Diagnosis:    The encounter diagnosis was [facility] ([facility] Diagnosis Date    Anxiety     B[facility] 2 disorder ([facility] 01/25/2021    Headache(784.0)     Headache, paroxysmal hemicrania, episodic     History of posttraumatic stress disorder (PTSD) 12/01/2020    Migraine     Tension headache      Problems with primary support group, Housing problems, Economic problems and Other psychosocial and environmental problems    Plan:  1. Continue medication management  2. CBT to target cognitive distortions  3.  Discuss therapeutic goals    Pt interventions:  Trained in strategies for increasing balanced thinking, Provided education, Discussed self-care (sleep, nutrition, rewarding activities, social support, exercise), Supportive techniques, Emphasized self-care as important for managing overall health and Identified maladaptive thoughts      Elena Bai MSW, LCSW

## 2021-05-17 NOTE — PATIENT INSTRUCTIONS
THINKING ERRORS    1. Mind reading: You assume that you know what people think without having sufficient evidence of their thoughts. \"He thinks I'm a loser. \"    2. Fortune telling: You predict the future- that things will get worse or that there is danger ahead. \"I'll fail that exam\" and \"I won't get the job. \"    3. Catastrophizing: You believe that what has happened or will happen will be so awful and unbearable that you won't be able to stand it. \"It would be terrible if I failed. \"    4. Labeling: You assign global negative traits to yourself and others. \"Im a failure\" or \"He's a rotten person. \"    5. Discounting positives: You claim that the positives that you or others attain are trivial. \"That's what wives are supposed to do--so it doesn't count when she's nice to me. \" \"Those successes were easy, so they don't matter. \"    6. Negative filter: You focus almost exclusively on the negatives and seldom notice the positives. \"Look at all of the people who don't like me. \"    7. Overgeneralizing: You perceive a global pattern of negatives on the basis of a single incident. \"This generally happens to me. I seem to fail at a lot of things. \"    8. Dichotomous thinking: You view events, or people, in all-or-nothing terms. \"I get rejected by everyone\" or \"It was a waste of time. \"    9. Shoulds: You interpret events in terms of how things should be or should have gone rather than simply focusing on what is. \"I should do well. If I don't, then I'm a failure. \"    10. Personalizing: You attribute a disproportionate amount of the blame to yourself for negative events and fail to see that certain events are also caused by others. \"The marriage ended because I failed\"    6. Blaming: You focus on the other person as the source of your negative feelings and you refuse to take responsibility for changing yourself. \"She's to blame for the way I feel now\" or \"My parents caused all my problems. \"    12. Unfair comparisons:  You interpret events in terms of standards that are unrealistic-for example, you focus primarily on others who do better than you and find yourself inferior in the comparison. \"She's more successful than I am\" or \"Others did better than I did on the test.\"    13. Regret orientation: You focus on the idea that you could have done better in the past, rather on what you can do better now. \"I could have had a better job if I had tried\". (best friends with the Shoulds)     15. What if?: You keep asking a series of questions about \"What if\" something happens and fail to be satisfied with any of the answers. \"Yeah, but what if I get anxious? Or what if I can't catch my breath? \"    15. Emotional reasoning: You let your feelings guide your interpretation of reality-for example, \"I feel depressed, therefore my marriage is not working out. \"    16. Inability to disconfirm: You reject any evidence or arguments that might contradict your negative thoughts. For example, when you have the thought \"I'm unlovable\", you reject as irrelevant any evidence that people like you. Consequently, your thought cannot be refuted. \"That's not the real issue. There are deeper problems. There are other factors. \"    17. Judgment Focus: You view yourself, others and events in terms of evaluations of good, bad or superior-inferior, rather than simply describing, accepting, or understanding. You are continually measuring yourself and others according to arbitrary standards, finding that you and others fall short. You are focused on the judgments of others as well as your own judgments of yourself. \"I didn't perform well in college\" or \"If I take up tennis, I won't do well\" or \"Look how successful she is. I'm not successful\". 15 Ways to Untwist Your Thinking    1. Identify the Distortions. Write down the negative thought and the distortions in each negative thought using the Cognitive Distortions Checklist.    2. The Straight-Forward Approach. Substitute a more positive and realistic thought. 3. The Cost-Benefit Analysis. List the advantages and disadvantages of a negative feeling, thought, belief or behavior. 4. Examine the Evidence. Instead of assuming that a negative thought is true, examine the actual evidence for it. 5. The Survey Method. Do a survey to find out if your thoughts and attitudes are realistic. 6. The Experimental Technique. Do an experiment to test the validity of your negative thought. 7. The Double-Standard Technique. Talk to yourself in the same compassionate way you might talk to a dear friend who was upset. 8. The Pleasure Predicting Method. Predict how satisfying activities will be from 0% to 100%. Record how satisfying they turn out. 9. The Vertical Arrow Technique. Draw a vertical arrow under your negative thought and ask why it would be upsetting if it were true. 10. Thinking in Shade of Gray. Instead of thinking about your problems in black-or-white categories, evaluate things in shades of gray. 11. Define Terms. When you label yourself as \"inferior\" or \"a loser,\" ask yourself what you mean by these labels. 12. Be Specific. Stick with reality and avoid judgments about reality. 13. The Semantic Method. Substitute language that is less emotionally loaded (useful for \"should\" statements and labeling). 14. Re-Attribution. Instead of blaming yourself for a problem, think about all the factors that may have contributed to it. 15. The Acceptance Paradox. Instead of defending yourself against your own self-criticisms, find truth in them and accept them.

## 2021-05-24 ENCOUNTER — TELEPHONE (OUTPATIENT)
Dept: NEUROLOGY | Age: 31
End: 2021-05-24

## 2021-05-26 ENCOUNTER — OFFICE VISIT (OUTPATIENT)
Dept: OBGYN CLINIC | Age: 31
End: 2021-05-26
Payer: COMMERCIAL

## 2021-05-26 VITALS
DIASTOLIC BLOOD PRESSURE: 78 MMHG | WEIGHT: 152 LBS | HEIGHT: 65 IN | SYSTOLIC BLOOD PRESSURE: 114 MMHG | HEART RATE: 101 BPM | BODY MASS INDEX: 25.33 KG/M2

## 2021-05-26 DIAGNOSIS — N83.201 BILATERAL OVARIAN CYSTS: ICD-10-CM

## 2021-05-26 DIAGNOSIS — N92.0 MENORRHAGIA WITH REGULAR CYCLE: ICD-10-CM

## 2021-05-26 DIAGNOSIS — D26.1 ENDOMETRIAL STROMAL NODULE: ICD-10-CM

## 2021-05-26 DIAGNOSIS — N94.6 DYSMENORRHEA: ICD-10-CM

## 2021-05-26 DIAGNOSIS — Z11.51 SCREENING FOR HPV (HUMAN PAPILLOMAVIRUS): ICD-10-CM

## 2021-05-26 DIAGNOSIS — N83.202 BILATERAL OVARIAN CYSTS: ICD-10-CM

## 2021-05-26 DIAGNOSIS — Z71.2 ENCOUNTER TO DISCUSS TEST RESULTS: ICD-10-CM

## 2021-05-26 DIAGNOSIS — Z01.419 WELL WOMAN EXAM WITH ROUTINE GYNECOLOGICAL EXAM: Primary | ICD-10-CM

## 2021-05-26 DIAGNOSIS — Z12.4 SCREENING FOR CERVICAL CANCER: ICD-10-CM

## 2021-05-26 PROCEDURE — 99385 PREV VISIT NEW AGE 18-39: CPT | Performed by: NURSE PRACTITIONER

## 2021-05-26 PROCEDURE — 99203 OFFICE O/P NEW LOW 30 MIN: CPT | Performed by: NURSE PRACTITIONER

## 2021-05-26 RX ORDER — TRANEXAMIC ACID 650 1/1
1300 TABLET ORAL 3 TIMES DAILY
Qty: 30 TABLET | Refills: 5 | Status: SHIPPED | OUTPATIENT
Start: 2021-05-26 | End: 2021-10-20 | Stop reason: SDUPTHER

## 2021-05-26 ASSESSMENT — ENCOUNTER SYMPTOMS
RESPIRATORY NEGATIVE: 1
EYES NEGATIVE: 1
GASTROINTESTINAL NEGATIVE: 1

## 2021-05-26 NOTE — PROGRESS NOTES
Celio Jacobo is a 27 y.o. female who presents today for her medical conditions/ complaints as noted below. Celio Jacobo is c/o of Establish Care, Results, and Annual Exam        HPI  Pt presents today for pap smear and breast exam.  She also wants to discuss cysts on ovaries and ultrasound findings. Doesn't keep up with periods bc had TL, but have become painful and heavy first few days. Denies FH of endometriosis. Last mammogram:  never  Last pap smear:  Several years  Contraception:  tubal  :  2  Para:  2  AB:  0  Last bone density:  never  Last colonoscopy: never  Patient's last menstrual period was 2021 (exact date). No obstetric history on file.     Past Medical History:   Diagnosis Date    Anxiety     Bipolar 2 disorder ([facility] [address] 2021    Headache(784.0)     Headache, paroxysmal hemicrania, episodic     History of posttraumatic stress disorder (PTSD) 2020    Migraine     Tension headache      Past Surgical History:   Procedure Laterality Date    ADENOIDECTOMY      CHOLECYSTECTOMY, LAPAROSCOPIC  12    TUBAL LIGATION       Family History   Problem Relation Age of Onset    Kidney stones Mother     Hypertension Father     Cancer Maternal Grandmother         cervical    Heart Failure Maternal Grandmother     Diabetes Paternal Grandfather     High Blood Pressure Paternal Grandfather     Diabetes Maternal Aunt     Cancer Maternal Cousin         cervical    Cancer Maternal Cousin         cervical     Social History     Tobacco Use    Smoking status: Current Every Day Smoker     Packs/day: 1.00     Types: Cigarettes     Start date:     Smokeless tobacco: Never Used   Substance Use Topics    Alcohol use: Yes     Comment: rarely       Current Outpatient Medications   Medication Sig Dispense Refill    tranexamic acid (LYSTEDA) 650 MG TABS tablet Take 2 tablets by mouth 3 times daily 30 tablet 5    AIMOVIG 140 MG/ML SOAJ INJECT 140 MG INTO THE SKIN EVERY 30 DAYS 3 pen 3    triamcinolone (KENALOG) 0.025 % cream Apply topically 2 times daily. 80 g 0    QUEtiapine (SEROQUEL) 25 MG tablet Take 1 tablet by mouth nightly 30 tablet 5    topiramate (TOPAMAX) 100 MG tablet TAKE 1 TABLET BY MOUTH TWO TIMES A DAY 60 tablet 5    butalbital-APAP-caffeine -40 MG CAPS per capsule Take 1 capsule by mouth every 6 hours as needed for Headaches 40 capsule 1    buPROPion (WELLBUTRIN XL) 150 MG extended release tablet TAKE 1 TABLET BY MOUTH ONE TIME A DAY IN THE MORNING 90 tablet 1    lamoTRIgine (LAMICTAL) 150 MG tablet Take 1 tablet by mouth daily (Patient taking differently: Take 200 mg by mouth daily ) 90 tablet 1     No current facility-administered medications for this visit. Allergies   Allergen Reactions    Morphine Other (See Comments)     Produces headaches for about 4-5 days. Vitals:    05/26/21 1056   BP: 114/78   Pulse: 101     Body mass index is 25.29 kg/m². Review of Systems   Constitutional: Negative. HENT: Negative. Eyes: Negative. Respiratory: Negative. Cardiovascular: Negative. Gastrointestinal: Negative. Genitourinary: Positive for menstrual problem. Negative for difficulty urinating, dyspareunia, dysuria, enuresis, frequency, hematuria, pelvic pain, urgency and vaginal discharge. Musculoskeletal: Negative. Skin: Negative. Neurological: Negative. Psychiatric/Behavioral: Negative. Physical Exam  Vitals and nursing note reviewed. Constitutional:       General: She is not in acute distress. Appearance: She is well-developed. She is not diaphoretic. HENT:      Head: Normocephalic and atraumatic. Right Ear: External ear normal.      Left Ear: External ear normal.      Nose: Nose normal.   Eyes:      General: Lids are normal.         Right eye: No discharge. Left eye: No discharge. Conjunctiva/sclera: Conjunctivae normal.      Pupils: Pupils are equal, round, and reactive to light. Neck:      Thyroid: No thyroid mass or thyromegaly. Trachea: No tracheal deviation. Cardiovascular:      Rate and Rhythm: Normal rate and regular rhythm. Heart sounds: Normal heart sounds. No murmur heard. Pulmonary:      Effort: Pulmonary effort is normal.      Breath sounds: Normal breath sounds. No wheezing. Chest:      Breasts: Breasts are symmetrical.         Right: No inverted nipple, mass, nipple discharge, skin change or tenderness. Left: No inverted nipple, mass, nipple discharge, skin change or tenderness. Abdominal:      General: Bowel sounds are normal. There is no distension. Palpations: Abdomen is soft. There is no mass. Tenderness: There is no abdominal tenderness. Hernia: There is no hernia in the left inguinal area. Genitourinary:     Labia:         Right: No rash, tenderness or lesion. Left: No rash, tenderness or lesion. Vagina: No vaginal discharge or tenderness. Cervix: No cervical motion tenderness or discharge (normal cervical mucosa). Uterus: Not enlarged and not tender. Adnexa:         Right: No mass, tenderness or fullness. Left: No mass, tenderness or fullness. Rectum: No external hemorrhoid. Comments: Pap collected for cervical cytology  Musculoskeletal:         General: No tenderness. Normal range of motion. Cervical back: Normal range of motion and neck supple. Lymphadenopathy:      Cervical:      Right cervical: No superficial, deep or posterior cervical adenopathy. Left cervical: No superficial, deep or posterior cervical adenopathy. Upper Body:      Right upper body: No supraclavicular, pectoral or epitrochlear adenopathy. Left upper body: No supraclavicular, pectoral or epitrochlear adenopathy. Skin:     General: Skin is warm and dry. Findings: No rash. Neurological:      Mental Status: She is alert and oriented to person, place, and time.  She is not disoriented. Sensory: No sensory deficit. Coordination: Coordination normal.      Gait: Gait normal.      Deep Tendon Reflexes: Reflexes are normal and symmetric. Psychiatric:         Speech: Speech normal.         Behavior: Behavior normal.         Thought Content: Thought content normal.         Judgment: Judgment normal.          Diagnosis Orders   1. Well woman exam with routine gynecological exam  PAP SMEAR    Human papillomavirus (HPV) DNA probe thin prep high risk   2. Screening for cervical cancer  PAP SMEAR   3. Screening for HPV (human papillomavirus)  Human papillomavirus (HPV) DNA probe thin prep high risk   4. Encounter to discuss test results     5. Bilateral ovarian cysts  US NON OB TRANSVAGINAL   6. Endometrial stromal nodule  US NON OB TRANSVAGINAL   7. Menorrhagia with regular cycle  tranexamic acid (LYSTEDA) 650 MG TABS tablet    US NON OB TRANSVAGINAL   8. Dysmenorrhea  tranexamic acid (LYSTEDA) 650 MG TABS tablet    US NON OB TRANSVAGINAL       MEDICATIONS:  Orders Placed This Encounter   Medications    tranexamic acid (LYSTEDA) 650 MG TABS tablet     Sig: Take 2 tablets by mouth 3 times daily     Dispense:  30 tablet     Refill:  5       ORDERS:  Orders Placed This Encounter   Procedures    US NON OB TRANSVAGINAL    PAP SMEAR    Human papillomavirus (HPV) DNA probe thin prep high risk       PLAN:  Pap collected  US findings discussed and will repeat US in 2-3 months. Aware if polyp dx or enlarges, will need EMB and possible D&C  lysteda for periods. Patient Instructions   We are committed to providing you with the best care possible. In order to help us achieve these goals please remember to bring all medications, herbal products, and over the counter supplements with you to each visit. If your provider has ordered testing for you, please be sure to follow up with our office if you have not received results within 7 days after testing took place.     *If you receive a

## 2021-06-02 LAB
HPV COMMENT: ABNORMAL
HPV TYPE 16: NOT DETECTED
HPV TYPE 18: NOT DETECTED
HPVOH (OTHER TYPES): DETECTED

## 2021-06-09 ENCOUNTER — OFFICE VISIT (OUTPATIENT)
Dept: FAMILY MEDICINE CLINIC | Age: 31
End: 2021-06-09
Payer: COMMERCIAL

## 2021-06-09 VITALS
SYSTOLIC BLOOD PRESSURE: 116 MMHG | DIASTOLIC BLOOD PRESSURE: 68 MMHG | HEART RATE: 99 BPM | HEIGHT: 65 IN | WEIGHT: 152 LBS | OXYGEN SATURATION: 98 % | BODY MASS INDEX: 25.33 KG/M2 | TEMPERATURE: 99.5 F

## 2021-06-09 DIAGNOSIS — L23.7 POISON IVY DERMATITIS: Primary | ICD-10-CM

## 2021-06-09 DIAGNOSIS — M26.643 ARTHRITIS OF BOTH TEMPOROMANDIBULAR JOINTS: ICD-10-CM

## 2021-06-09 PROCEDURE — 96372 THER/PROPH/DIAG INJ SC/IM: CPT | Performed by: FAMILY MEDICINE

## 2021-06-09 PROCEDURE — 99213 OFFICE O/P EST LOW 20 MIN: CPT | Performed by: FAMILY MEDICINE

## 2021-06-09 RX ORDER — PREDNISONE 20 MG/1
TABLET ORAL
Qty: 24 TABLET | Refills: 0 | Status: SHIPPED | OUTPATIENT
Start: 2021-06-09 | End: 2021-06-30 | Stop reason: ALTCHOICE

## 2021-06-09 RX ORDER — DEXAMETHASONE SODIUM PHOSPHATE 10 MG/ML
10 INJECTION INTRAMUSCULAR; INTRAVENOUS ONCE
Status: COMPLETED | OUTPATIENT
Start: 2021-06-09 | End: 2021-06-09

## 2021-06-09 RX ADMIN — DEXAMETHASONE SODIUM PHOSPHATE 10 MG: 10 INJECTION INTRAMUSCULAR; INTRAVENOUS at 16:29

## 2021-06-09 NOTE — PROGRESS NOTES
Paternal Grandfather     High Blood Pressure Paternal Grandfather     Diabetes Maternal Aunt     Cancer Maternal Cousin         cervical    Cancer Maternal Cousin         cervical       Social History     Tobacco Use    Smoking status: Current Every Day Smoker     Packs/day: 1.00     Types: Cigarettes     Start date: 2005    Smokeless tobacco: Never Used   Substance Use Topics    Alcohol use: Yes     Comment: rarely      Current Outpatient Medications   Medication Sig Dispense Refill    predniSONE (DELTASONE) 20 MG tablet 3 tablets po for 5 days, 2 tablets po for 3 days, 1 tablet po for 3 days. 24 tablet 0    tranexamic acid (LYSTEDA) 650 MG TABS tablet Take 2 tablets by mouth 3 times daily 30 tablet 5    AIMOVIG 140 MG/ML SOAJ INJECT 140 MG INTO THE SKIN EVERY 30 DAYS 3 pen 3    triamcinolone (KENALOG) 0.025 % cream Apply topically 2 times daily. 80 g 0    QUEtiapine (SEROQUEL) 25 MG tablet Take 1 tablet by mouth nightly 30 tablet 5    topiramate (TOPAMAX) 100 MG tablet TAKE 1 TABLET BY MOUTH TWO TIMES A DAY 60 tablet 5    butalbital-APAP-caffeine -40 MG CAPS per capsule Take 1 capsule by mouth every 6 hours as needed for Headaches 40 capsule 1    buPROPion (WELLBUTRIN XL) 150 MG extended release tablet TAKE 1 TABLET BY MOUTH ONE TIME A DAY IN THE MORNING 90 tablet 1    lamoTRIgine (LAMICTAL) 150 MG tablet Take 1 tablet by mouth daily (Patient taking differently: Take 200 mg by mouth daily ) 90 tablet 1     No current facility-administered medications for this visit. Allergies   Allergen Reactions    Morphine Other (See Comments)     Produces headaches for about 4-5 days. Review of Systems   Constitutional: Negative for chills and fever. HENT: Negative for facial swelling and mouth sores. Eyes: Negative for discharge and itching. Respiratory: Negative for apnea and stridor. Cardiovascular: Negative for chest pain and palpitations.    Gastrointestinal: Negative for nausea maintenance. Instructed to continue current medications, diet and exercise. Patient agreedwith treatment plan. Follow up as directed. Old records reviewed, where available.     Sofia Montoya MD    Note:  dictated using Dragon software

## 2021-06-09 NOTE — PROGRESS NOTES
After obtaining consent, and per orders of Dr. Rodarte Records, injection of Decadron 10mg given in Right upper quad. gluteus by Milla Garvin. Patient instructed to remain in clinic for 20 minutes afterwards, and to report any adverse reaction to me immediately.

## 2021-06-09 NOTE — PATIENT INSTRUCTIONS
Patient Education        Poison MINOR-[place], [place], and [place]: Care Instructions  Your Care Instructions     Poison ivy, poison oak, and poison sumac are plants that can cause a skin rash upon contact. The red, itchy rash often shows up in lines or streaks and may cause fluid-filled blisters or large, raised hives. The rash is caused by an allergic reaction to an oil in poison ivy, oak, and sumac. The rash may occur when you touch the plant or when you touch clothing, pet fur, sporting gear, gardening tools, or other objects that have come in contact with one of these plants. You cannot catch or spread the rash, even if you touch it or the blister fluid, because the plant oil will already have been absorbed or washed off the skin. The rash may seem to be spreading, but either it is still developing from earlier contact or you have touched something that still has the plant oil on it. Follow-up care is a key part of your treatment and safety. Be sure to make and go to all appointments, and call your doctor if you are having problems. It's also a good idea to know your test results and keep a list of the medicines you take. How can you care for yourself at home? · If your doctor prescribed a cream, use it as directed. If your doctor prescribed medicine, take it exactly as prescribed. Call your doctor if you think you are having a problem with your medicine. · Use cold, wet cloths to reduce itching. · Keep cool, and stay out of the sun. · Leave the rash open to the air. · Wash all clothing or other things that may have come in contact with the plant oil. · Avoid most lotions and ointments until the rash heals. Calamine lotion may help relieve symptoms of a plant rash. Use it 3 or 4 times a day. To prevent poison ivy exposure  If you know that you will be near poison ivy, oak, or sumac, you can try these options:  · Use a product designed to help prevent plant oil from getting on the skin.  These products, such as Ivy X

## 2021-06-13 ASSESSMENT — ENCOUNTER SYMPTOMS
FACIAL SWELLING: 0
VOMITING: 0
BACK PAIN: 0
EYE ITCHING: 0
APNEA: 0
COLOR CHANGE: 0
EYE DISCHARGE: 0
STRIDOR: 0
NAUSEA: 0

## 2021-06-22 ENCOUNTER — OFFICE VISIT (OUTPATIENT)
Dept: PSYCHIATRY | Age: 31
End: 2021-06-22
Payer: COMMERCIAL

## 2021-06-22 DIAGNOSIS — F31.81 BIPOLAR 2 DISORDER (HCC): Primary | ICD-10-CM

## 2021-06-22 PROCEDURE — 90837 PSYTX W PT 60 MINUTES: CPT | Performed by: SOCIAL WORKER
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
## 2021-06-23 DIAGNOSIS — G43.019 INTRACTABLE MIGRAINE WITHOUT AURA AND WITHOUT STATUS MIGRAINOSUS: ICD-10-CM

## 2021-06-23 DIAGNOSIS — G44.221 CHRONIC TENSION-TYPE HEADACHE, INTRACTABLE: ICD-10-CM

## 2021-06-23 RX ORDER — BUTALBITAL, ACETAMINOPHEN AND CAFFEINE 300; 40; 50 MG/1; MG/1; MG/1
CAPSULE ORAL
Qty: 40 CAPSULE | Refills: 2 | Status: SHIPPED | OUTPATIENT
Start: 2021-06-23 | End: 2021-10-20 | Stop reason: SDUPTHER

## 2021-06-30 ENCOUNTER — OFFICE VISIT (OUTPATIENT)
Dept: PSYCHIATRY | Age: 31
End: 2021-06-30
Payer: COMMERCIAL

## 2021-06-30 VITALS
TEMPERATURE: 99.2 F | SYSTOLIC BLOOD PRESSURE: 99 MMHG | DIASTOLIC BLOOD PRESSURE: 73 MMHG | OXYGEN SATURATION: 99 % | HEART RATE: 90 BPM | BODY MASS INDEX: 24.65 KG/M2 | WEIGHT: 148.1 LBS

## 2021-06-30 DIAGNOSIS — F41.0 GENERALIZED ANXIETY DISORDER WITH PANIC ATTACKS: ICD-10-CM

## 2021-06-30 DIAGNOSIS — F43.12 CHRONIC POST-TRAUMATIC STRESS DISORDER (PTSD): ICD-10-CM

## 2021-06-30 DIAGNOSIS — F31.81 BIPOLAR II DISORDER, MILD, DEPRESSED, WITH ANXIOUS DISTRESS (HCC): Primary | ICD-10-CM

## 2021-06-30 DIAGNOSIS — F41.1 GENERALIZED ANXIETY DISORDER WITH PANIC ATTACKS: ICD-10-CM

## 2021-06-30 PROCEDURE — 99214 OFFICE O/P EST MOD 30 MIN: CPT | Performed by: PSYCHIATRY & NEUROLOGY

## 2021-06-30 RX ORDER — QUETIAPINE FUMARATE 50 MG/1
50 TABLET, FILM COATED ORAL NIGHTLY
Qty: 30 TABLET | Refills: 2 | Status: SHIPPED | OUTPATIENT
Start: 2021-06-30 | End: 2021-09-03 | Stop reason: SDUPTHER
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
## 2021-07-27 ENCOUNTER — TELEPHONE (OUTPATIENT)
Dept: PSYCHIATRY | Age: 31
End: 2021-07-27

## 2021-07-27 NOTE — TELEPHONE ENCOUNTER
Called pt for appointment reminder.     -unable to leave Vm  -vm not set up yet      Electronically signed by Rocio Dc MA on 7/27/2021 at 9:13 AM

## 2021-07-28 ENCOUNTER — OFFICE VISIT (OUTPATIENT)
Dept: PSYCHIATRY | Age: 31
End: 2021-07-28
Payer: COMMERCIAL

## 2021-07-28 DIAGNOSIS — F31.81 BIPOLAR 2 DISORDER (HCC): Primary | ICD-10-CM

## 2021-07-28 PROCEDURE — 90837 PSYTX W PT 60 MINUTES: CPT | Performed by: SOCIAL WORKER

## 2021-07-28 NOTE — PATIENT INSTRUCTIONS
Assertive Communication     Assertiveness Is Simple but HARD    NonAssertive   Assertive   Aggressive     (Passive)            (Tactful)             (Rude)           H onest         X  H onest          X  H onest             X A ppropriate        X  A ppropriate                 A ppropriate             X R espectful        X  R espectful              R espectful      D irect                   X  D irect         X  D irect        Assertiveness involves respecting your rights and the rights of others. Important Facts About Assertiveness      Use I or me statements such as When you do ______, I feel _____.     Voice tone, eye contact, and body posture are important parts of assertive communication.  Use a steady and calm voice, stand or sit up straight, look the other person in the eyes without glaring.  Feelings are usually only one word (e.g. angry, anxious, happy, sad, hurt, frustrated, joyful)    Remember, assertiveness doesnt guarantee that you will get what you want or that the other person will understand your concerns or be happy with what you said. It does improve the chances that the other person will understand what you want or how you feel and thus improve your chances of communicating effectively. Four Essential Steps to Assertive Communication   1. Tell the person what you think about their behavior without accusing them. 2. Tell them how you feel when they behave a certain way. 3. Tell them how their behavior affects you and your relationship with them. 4. Tell them what you would prefer them to do instead. XYZ* Formula for Effective Communication     The goal of the XYZ* formula is to express the way you feel (internal world) in response to others behavior (external world) in specific situations. You are the only person who has access to your feelings. Others have no access to your internal world. The only way they will know what you are feeling is if you tell them. Similarly, you only have access to other peoples external world. It is very easy to make a mistake when trying to guess what others are feeling or intending. I feel X  when you do Y  in situation Z  and I would like *    I feel angry  when you leave your socks and underwear on the bedroom floor  after work  and I would like you to put them in the hamper. I felt insignificant  when you left me with an empty gas tank  yesterday  and I would like you to leave the car with at least 1/4 tank of gas. I feel angry  when you dont call me  if you are staying late at work  and I would like you to call as soon as you know you will be late. I feel loved  when you kiss me  when you get home  and I would like you to do that everyday.
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
## 2021-08-04 ENCOUNTER — HOSPITAL ENCOUNTER (OUTPATIENT)
Dept: ULTRASOUND IMAGING | Age: 31
Discharge: HOME | End: 2021-08-04
Payer: COMMERCIAL

## 2021-08-04 DIAGNOSIS — D26.1 ENDOMETRIAL STROMAL NODULE: ICD-10-CM

## 2021-08-04 DIAGNOSIS — N83.201 BILATERAL OVARIAN CYSTS: ICD-10-CM

## 2021-08-04 DIAGNOSIS — N92.0 MENORRHAGIA WITH REGULAR CYCLE: ICD-10-CM

## 2021-08-04 DIAGNOSIS — N94.6 DYSMENORRHEA: ICD-10-CM

## 2021-08-04 DIAGNOSIS — N83.202 BILATERAL OVARIAN CYSTS: ICD-10-CM

## 2021-08-04 PROCEDURE — 76830 TRANSVAGINAL US NON-OB: CPT

## 2021-08-19 NOTE — PATIENT INSTRUCTIONS
Plan:  Continue:  Trazodone, 50mg, take 1 tab nightly as needed for sleep  Wellbutrin XL, 150mg, daily  Topamax, 100mg, twice daily (given by Dr. Diandra Hussein for headaches)  Lamictal, 150mg, daily (in the morning)    Stop:  Melatonin, 3mg, take 1-2 tabs    Decrease:   Hydroxyzine, 10mg, take 1-2 tabs up to 3 times daily as needed for anxiety/panic attacks    Start:  Seroquel, 25mg, daily for anxiety/racing thoughts    Continue therapy with CHAU Giang    Follow up: Return in about 3 months (around 9/1/2020). Patient Education        quetiapine  Pronunciation:  donald gibbs  Brand:  SEROquel, SEROquel XR  What is the most important information I should know about quetiapine? Some people have thoughts about suicide while taking quetiapine. Stay alert to changes in your mood or symptoms. Report any new or worsening symptoms to your doctor. Quetiapine is not approved for use in older adults with dementia-related psychosis. What is quetiapine? Quetiapine is an antipsychotic medicine that is used to treat schizophrenia in adults and children who are at least 15years old. Quetiapine is used to treat bipolar disorder (manic depression) in adults and children who are at least 8years old. Quetiapine is also used together with antidepressant medications to treat major depressive disorder in adults. Quetiapine may also be used for purposes not listed in this medication guide. What should I discuss with my healthcare provider before taking quetiapine? You should not use quetiapine if you are allergic to it. Quetiapine may increase the risk of death in older adults with dementia-related psychosis and is not approved for this use. Quetiapine is not approved for use by anyone younger than 8years old.   Tell your doctor if you have ever had:  · liver disease;  · heart problems;  · high or low blood pressure;  · low white blood cell (WBC) counts;  · abnormal thyroid tests or prolactin levels;  · constipation or urination · BP acceptable on admission   · Hold Coreg, Prinzide   · Restart tomorrow 8/20

## 2021-08-24 ENCOUNTER — TELEPHONE (OUTPATIENT)
Dept: PSYCHIATRY | Age: 31
End: 2021-08-24

## 2021-08-24 NOTE — TELEPHONE ENCOUNTER
Called pt for appointment reminder.     -unable to leave Vm  -vm not set up yet      Electronically signed by Keegan Mir MA on 8/24/2021 at 3:32 PM

## 2021-08-25 ENCOUNTER — OFFICE VISIT (OUTPATIENT)
Dept: PSYCHIATRY | Age: 31
End: 2021-08-25
Payer: COMMERCIAL

## 2021-08-25 VITALS
TEMPERATURE: 98.6 F | BODY MASS INDEX: 24.3 KG/M2 | WEIGHT: 146 LBS | SYSTOLIC BLOOD PRESSURE: 117 MMHG | DIASTOLIC BLOOD PRESSURE: 65 MMHG | OXYGEN SATURATION: 97 % | HEART RATE: 105 BPM

## 2021-08-25 DIAGNOSIS — F31.81 BIPOLAR II DISORDER, MILD, DEPRESSED, WITH ANXIOUS DISTRESS (HCC): Primary | ICD-10-CM

## 2021-08-25 DIAGNOSIS — F41.0 GENERALIZED ANXIETY DISORDER WITH PANIC ATTACKS: ICD-10-CM

## 2021-08-25 DIAGNOSIS — F43.12 CHRONIC POST-TRAUMATIC STRESS DISORDER (PTSD): ICD-10-CM

## 2021-08-25 DIAGNOSIS — F41.1 GENERALIZED ANXIETY DISORDER WITH PANIC ATTACKS: ICD-10-CM

## 2021-08-25 DIAGNOSIS — F51.05 INSOMNIA DUE TO OTHER MENTAL DISORDER: ICD-10-CM

## 2021-08-25 DIAGNOSIS — F99 INSOMNIA DUE TO OTHER MENTAL DISORDER: ICD-10-CM

## 2021-08-25 PROCEDURE — 99214 OFFICE O/P EST MOD 30 MIN: CPT | Performed by: PSYCHIATRY & NEUROLOGY

## 2021-08-25 NOTE — PROGRESS NOTES
postmenopausal bleeding, menstrual pain (dymenorrhea, vaginal discharge)    Musculoskeletal: (bone pain/fracture, joint pain or swelling, musle pain)    Integumentary: (rashes, acne, non-healing sores, itching, breast lumps, breast pain, nipple discharge, hair loss)    Neurologic: (HA, muscle weakness, paresthesias (numbness, coldness, crawling or prickling), memory loss, seizure, dizziness)    Psychiatric:  (anxiety, sadness, irritability/anger, insomnia, suicidality)    Endocrine: (heat or cold intolerance, excessive thirst (polydipsia), excessive hunger (polyphagia))    Immune/Allergic: (hives, seasonal or environmental allergies, HIV exposure)    Hematologic/Lymphatic: (lymph node enlargement, easy bleeding or bruising)    History obtained via chart review and patient    PCP is Sonja Perez MD       Current Meds:    Prior to Admission medications    Medication Sig Start Date End Date Taking? Authorizing Provider   QUEtiapine (SEROQUEL) 50 MG tablet Take 1 tablet by mouth nightly 6/30/21 7/30/21  Saul Amaya MD   butalbital-APAP-caffeine -40 MG CAPS per capsule TAKE ONE CAPSULE TAKE CAPSULE BY MOUTH EVERY 6 HOURS AS NEEDED FOR HEADACHES 6/23/21   Radha Sarabia MD   tranexamic acid (LYSTEDA) 650 MG TABS tablet Take 2 tablets by mouth 3 times daily 5/26/21   CHRIS Morrissey   AIMOVIG 140 MG/ML SOAJ INJECT 140 MG INTO THE SKIN EVERY 30 DAYS 5/4/21   Radha Sarabia MD   triamcinolone (KENALOG) 0.025 % cream Apply topically 2 times daily.  5/3/21   Ladarius Ashraf MD   topiramate (TOPAMAX) 100 MG tablet TAKE 1 TABLET BY MOUTH TWO TIMES A DAY 1/15/21   Radha Sarabia MD   buPROPion (WELLBUTRIN XL) 150 MG extended release tablet TAKE 1 TABLET BY MOUTH ONE TIME A DAY IN THE MORNING 6/1/20   CHRIS Warren NP   lamoTRIgine (LAMICTAL) 150 MG tablet Take 1 tablet by mouth daily  Patient taking differently: Take 200 mg by mouth daily  6/1/20   CHRIS Warren NP MSE:  Appearance: Appropriately groomed. Made good eye contact. Behavior: Calm, cooperative. No psychomotor retardation/agitation appreciated. Gait unremarkable. No abnormal movements or tremor. Speech: Normal in tone, volume, and quality. No slurring, dysarthria or pressured speech noted. Mood: \"Ok\"   Affect: Mood congruent. Thought Process: Appears linear, logical and goal oriented. Causality appears intact. Thought Content: Denies active suicidal and homicidal ideations. No overt delusions or paranoia appreciated. Perceptions: Denies auditory or visual hallucinations at present time. Not responding to internal stimuli. Concentration: Intact. Orientation: to person, place, date, and situation. Language: Intact. Fund of information: Intact. Memory: Recent and remote appear intact. Impulsivity: Limited. Neurovegitative: Fair appetite and improved sleep. Insight: Fair. Judgment: Fair.       Lab Results   Component Value Date     01/27/2021    K 3.7 01/27/2021     01/27/2021    CO2 19 (L) 01/27/2021    BUN 10 01/27/2021    CREATININE 0.6 01/27/2021    GLUCOSE 116 (H) 01/27/2021    CALCIUM 8.9 01/27/2021    PROT 6.7 01/27/2021    LABALBU 4.0 01/27/2021    BILITOT <0.2 01/27/2021    ALKPHOS 72 01/27/2021    AST 10 01/27/2021    ALT 7 01/27/2021    LABGLOM >60 01/27/2021    GFRAA >59 01/27/2021     Lab Results   Component Value Date     01/27/2021    K 3.7 01/27/2021     01/27/2021    CO2 19 01/27/2021    BUN 10 01/27/2021    CREATININE 0.6 01/27/2021    GLUCOSE 116 01/27/2021    CALCIUM 8.9 01/27/2021      No results found for: CHOL  No results found for: TRIG  No results found for: HDL  No results found for: LDLCHOLESTEROL, LDLCALC  No results found for: LABVLDL, VLDL  No results found for: CHOLHDLRATIO  No results found for: LABA1C  No results found for: EAG  No results found for: TSHFT4, TSH  No results found for: VITD25    Assessments Administered:      Assessment:   1. Bipolar II disorder, mild, depressed, with anxious distress ([address]    2. Generalized anxiety disorder with panic attacks    3. Chronic post-traumatic stress disorder (PTSD)    4. Insomnia due to other mental disorder         No evidence of acute suicidality, homicidality or psychosis observed today. Psychiatrically stable. Plan:  1. Continue current regimen without changes. The risks, benefits, side effects, indications, contraindications, and adverse effects of the medications have been discussed. Yes.  2. The pt has verbalized understanding and has capacity to give informed consent. 3. The Latosha Mckeon report has been reviewed according to [facility] regulations. 4. Supportive therapy offered. Patient will continue seeing her therapist at the clinic. 5. Follow up: Return in about 2 months (around 10/25/2021). 6. The patient has been advised to call with any problems. 7. Controlled substance Treatment Plan: NA  8. The above listed medications have been continued, modifications in meds and other orders/labs as follows: No orders of the defined types were placed in this encounter. No orders of the defined types were placed in this encounter. 9. Additional comments: Consider checking thyroid function and vitamin B12 and D levels next time. 10.Over 50% of the total visit time of  35  minutes was spent on counseling and/or coordination of care of:                        1. Bipolar II disorder, mild, depressed, with anxious distress ([address]    2. Generalized anxiety disorder with panic attacks    3. Chronic post-traumatic stress disorder (PTSD)    4.  Insomnia due to other mental disorder        Jeanine Pena MD

## 2021-08-27 ENCOUNTER — TELEPHONE (OUTPATIENT)
Dept: PSYCHIATRY | Age: 31
End: 2021-08-27

## 2021-08-27 NOTE — TELEPHONE ENCOUNTER
Called pt for appointment reminder.     -Pt confirmed      Electronically signed by Maxwell Sullivan MA on 8/27/2021 at 1:30 PM no

## 2021-08-28 ENCOUNTER — HOSPITAL ENCOUNTER (EMERGENCY)
Age: 31
Discharge: HOME | End: 2021-08-28
Payer: COMMERCIAL

## 2021-08-28 VITALS
RESPIRATION RATE: 16 BRPM | TEMPERATURE: 98.1 F | OXYGEN SATURATION: 92 % | HEART RATE: 98 BPM | DIASTOLIC BLOOD PRESSURE: 66 MMHG | SYSTOLIC BLOOD PRESSURE: 98 MMHG

## 2021-08-28 DIAGNOSIS — N30.00 ACUTE CYSTITIS WITHOUT HEMATURIA: Primary | ICD-10-CM

## 2021-08-28 LAB
ALBUMIN SERPL-MCNC: 4.6 G/DL (ref 3.5–5.2)
ALP BLD-CCNC: 88 U/L (ref 35–104)
ALT SERPL-CCNC: 10 U/L (ref 5–33)
ANION GAP SERPL CALCULATED.3IONS-SCNC: 11 MMOL/L (ref 7–19)
AST SERPL-CCNC: 20 U/L (ref 5–32)
BACTERIA: ABNORMAL /HPF
BASOPHILS ABSOLUTE: 0.1 K/UL (ref 0–0.2)
BASOPHILS RELATIVE PERCENT: 0.6 % (ref 0–1)
BILIRUB SERPL-MCNC: <0.2 MG/DL (ref 0.2–1.2)
BILIRUBIN URINE: NEGATIVE
BLOOD, URINE: ABNORMAL
BUN BLDV-MCNC: 8 MG/DL (ref 6–20)
CALCIUM SERPL-MCNC: 9.2 MG/DL (ref 8.6–10)
CHLORIDE BLD-SCNC: 100 MMOL/L (ref 98–111)
CLARITY: CLEAR
CO2: 28 MMOL/L (ref 22–29)
COLOR: ABNORMAL
CREAT SERPL-MCNC: 0.5 MG/DL (ref 0.5–0.9)
CRYSTALS, UA: ABNORMAL /HPF
EOSINOPHILS ABSOLUTE: 0.3 K/UL (ref 0–0.6)
EOSINOPHILS RELATIVE PERCENT: 3 % (ref 0–5)
EPITHELIAL CELLS, UA: 2 /HPF (ref 0–5)
GFR AFRICAN AMERICAN: >59
GFR NON-AFRICAN AMERICAN: >60
GLUCOSE BLD-MCNC: 95 MG/DL (ref 74–109)
GLUCOSE URINE: NEGATIVE MG/DL
HCG(URINE) PREGNANCY TEST: NEGATIVE
HCT VFR BLD CALC: 42.6 % (ref 37–47)
HEMOGLOBIN: 13.8 G/DL (ref 12–16)
HYALINE CASTS: 23 /HPF (ref 0–8)
IMMATURE GRANULOCYTES #: 0 K/UL
KETONES, URINE: 15 MG/DL
LEUKOCYTE ESTERASE, URINE: ABNORMAL
LYMPHOCYTES ABSOLUTE: 2.3 K/UL (ref 1.1–4.5)
LYMPHOCYTES RELATIVE PERCENT: 25.2 % (ref 20–40)
MCH RBC QN AUTO: 30.3 PG (ref 27–31)
MCHC RBC AUTO-ENTMCNC: 32.4 G/DL (ref 33–37)
MCV RBC AUTO: 93.6 FL (ref 81–99)
MONOCYTES ABSOLUTE: 0.5 K/UL (ref 0–0.9)
MONOCYTES RELATIVE PERCENT: 5.3 % (ref 0–10)
NEUTROPHILS ABSOLUTE: 6.1 K/UL (ref 1.5–7.5)
NEUTROPHILS RELATIVE PERCENT: 65.7 % (ref 50–65)
NITRITE, URINE: NEGATIVE
PDW BLD-RTO: 14.4 % (ref 11.5–14.5)
PH UA: 5.5 (ref 5–8)
PLATELET # BLD: 242 K/UL (ref 130–400)
PMV BLD AUTO: 11.5 FL (ref 9.4–12.3)
POTASSIUM REFLEX MAGNESIUM: 3.6 MMOL/L (ref 3.5–5)
PROTEIN UA: ABNORMAL MG/DL
RBC # BLD: 4.55 M/UL (ref 4.2–5.4)
RBC UA: ABNORMAL /HPF (ref 0–2)
SARS-COV-2, NAAT: NOT DETECTED
SODIUM BLD-SCNC: 139 MMOL/L (ref 136–145)
SPECIFIC GRAVITY UA: 1.02 (ref 1–1.03)
TOTAL PROTEIN: 7.6 G/DL (ref 6.6–8.7)
UROBILINOGEN, URINE: 1 E.U./DL
WBC # BLD: 9.3 K/UL (ref 4.8–10.8)
WBC UA: 23 /HPF (ref 0–5)

## 2021-08-28 PROCEDURE — 80053 COMPREHEN METABOLIC PANEL: CPT

## 2021-08-28 PROCEDURE — 81001 URINALYSIS AUTO W/SCOPE: CPT

## 2021-08-28 PROCEDURE — 36415 COLL VENOUS BLD VENIPUNCTURE: CPT

## 2021-08-28 PROCEDURE — 87635 SARS-COV-2 COVID-19 AMP PRB: CPT

## 2021-08-28 PROCEDURE — 84703 CHORIONIC GONADOTROPIN ASSAY: CPT

## 2021-08-28 PROCEDURE — 96374 THER/PROPH/DIAG INJ IV PUSH: CPT

## 2021-08-28 PROCEDURE — 2580000003 HC RX 258: Performed by: NURSE PRACTITIONER

## 2021-08-28 PROCEDURE — 85025 COMPLETE CBC W/AUTO DIFF WBC: CPT

## 2021-08-28 PROCEDURE — 99283 EMERGENCY DEPT VISIT LOW MDM: CPT

## 2021-08-28 PROCEDURE — 6360000002 HC RX W HCPCS: Performed by: NURSE PRACTITIONER

## 2021-08-28 RX ORDER — SULFAMETHOXAZOLE AND TRIMETHOPRIM 800; 160 MG/1; MG/1
1 TABLET ORAL 2 TIMES DAILY
Qty: 10 TABLET | Refills: 0 | Status: SHIPPED | OUTPATIENT
Start: 2021-08-28 | End: 2021-09-02

## 2021-08-28 RX ORDER — SODIUM CHLORIDE, SODIUM LACTATE, POTASSIUM CHLORIDE, CALCIUM CHLORIDE 600; 310; 30; 20 MG/100ML; MG/100ML; MG/100ML; MG/100ML
1000 INJECTION, SOLUTION INTRAVENOUS ONCE
Status: COMPLETED | OUTPATIENT
Start: 2021-08-28 | End: 2021-08-28

## 2021-08-28 RX ORDER — ONDANSETRON 2 MG/ML
4 INJECTION INTRAMUSCULAR; INTRAVENOUS ONCE
Status: COMPLETED | OUTPATIENT
Start: 2021-08-28 | End: 2021-08-28

## 2021-08-28 RX ADMIN — SODIUM CHLORIDE, POTASSIUM CHLORIDE, SODIUM LACTATE AND CALCIUM CHLORIDE 1000 ML: 600; 310; 30; 20 INJECTION, SOLUTION INTRAVENOUS at 15:50

## 2021-08-28 RX ADMIN — ONDANSETRON HYDROCHLORIDE 4 MG: 2 SOLUTION INTRAMUSCULAR; INTRAVENOUS at 15:51

## 2021-08-28 ASSESSMENT — ENCOUNTER SYMPTOMS
SHORTNESS OF BREATH: 0
ABDOMINAL PAIN: 0
NAUSEA: 1
COUGH: 0
BACK PAIN: 0
DIARRHEA: 1
VOMITING: 1
CHEST TIGHTNESS: 0

## 2021-08-28 ASSESSMENT — PAIN SCALES - GENERAL: PAINLEVEL_OUTOF10: 2

## 2021-08-28 NOTE — ED PROVIDER NOTES
[facility] EMERGENCY DEPT  EMERGENCY DEPARTMENT ENCOUNTER      Pt Name: Dixie Farah  MRN: [id number]  [place] 1990  Date of evaluation: 8/28/2021  Provider: CHRIS Huang [id number]       Chief Complaint   Patient presents with    Nausea    Diarrhea         HISTORY OF PRESENT ILLNESS   (Location/Symptom, Timing/Onset, Context/Setting, Quality, Duration, Modifying Factors, Severity)  Note limiting factors. Dixie Farah is a 27 y.o. female who presents to the emergency department with concern for vomiting and diarrhea for nearly a week. States anything she eats/drinks comes out one end or other. She has no abdominal pain, no urinary symptoms. Nothing makes better or worse. 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 No fever or chills. Reports abdominal surgeries include gall bladder and tubal ligation. HPI    Nursing Notes were reviewed. REVIEW OF SYSTEMS    (2-9 systems for level 4, 10 or more for level 5)     Review of Systems   Constitutional: Negative for chills and fever. HENT: Negative for congestion. Respiratory: Negative for cough, chest tightness and shortness of breath. Cardiovascular: Negative for chest pain and palpitations. Gastrointestinal: Positive for diarrhea, nausea and vomiting. Negative for abdominal pain. Genitourinary: Negative for dysuria, flank pain and urgency. Musculoskeletal: Negative for back pain. Neurological: Negative for dizziness, syncope, weakness, light-headedness and headaches. Except as noted above the remainder of the review of systems was reviewed and negative.        PAST MEDICAL HISTORY     Past Medical History:   Diagnosis Date    Anxiety     Bipolar 2 disorder ([facility] [address] 01/25/2021    Headache(784.0)     Headache, paroxysmal hemicrania, episodic     History of posttraumatic stress disorder (PTSD) 12/01/2020    Migraine     Tension headache          SURGICAL HISTORY       Past Surgical History:   Procedure Laterality Date    ADENOIDECTOMY      CHOLECYSTECTOMY, LAPAROSCOPIC  11/5/12    TUBAL LIGATION           CURRENT MEDICATIONS       Previous Medications    AIMOVIG 140 MG/ML SOAJ    INJECT 140 MG INTO THE SKIN EVERY 30 DAYS    BUPROPION (WELLBUTRIN XL) 150 MG EXTENDED RELEASE TABLET    TAKE 1 TABLET BY MOUTH ONE TIME A DAY IN THE MORNING    BUTALBITAL-APAP-CAFFEINE -40 MG CAPS PER CAPSULE    TAKE ONE CAPSULE TAKE CAPSULE BY MOUTH EVERY 6 HOURS AS NEEDED FOR HEADACHES    LAMOTRIGINE (LAMICTAL) 150 MG TABLET    Take 1 tablet by mouth daily    QUETIAPINE (SEROQUEL) 50 MG TABLET    Take 1 tablet by mouth nightly    TOPIRAMATE (TOPAMAX) 100 MG TABLET    TAKE 1 TABLET BY MOUTH TWO TIMES A DAY    TRANEXAMIC ACID (LYSTEDA) 650 MG TABS TABLET    Take 2 tablets by mouth 3 times daily    TRIAMCINOLONE (KENALOG) 0.025 % CREAM    Apply topically 2 times daily.        ALLERGIES     Morphine    FAMILY HISTORY       Family History   Problem Relation Age of Onset    Kidney stones Mother     Hypertension Father     Cancer Maternal Grandmother         cervical    Heart Failure Maternal Grandmother     Diabetes Paternal Grandfather     High Blood Pressure Paternal Grandfather     Diabetes Maternal Aunt     Cancer Maternal Cousin         cervical    Cancer Maternal Cousin         cervical          SOCIAL HISTORY       Social History     Socioeconomic History    Marital status:      Spouse name: Joelle Dejesus Number of children: 2    Years of education: 15    Highest education level: Some college, no degree   Occupational History    Occupation: Behavioral Tech      Comment: [employer]   Tobacco Use    Smoking status: Current Every Day Smoker     Packs/day: 1.00     Types: Cigarettes     Start date: 2005    Smokeless tobacco: Never Used   Vaping Use    Vaping Use: Never used   Substance and Sexual Activity    Alcohol use: Yes     Comment: rarely    Drug use: No    Sexual activity: Yes     Comment: tubal ligation   Other Topics Concern    None   Social History Narrative    PREVIOUS MEDICATION TRIALS    Lamictal    Celexa (says it made her worse)    Trazodone        PREVIOUS PSYCHIATRIC HISTORY    She says that she has been treated for anxiety/depression off and on since age 20. FAMILY PSYCHIATRIC HISTORY    Father, PTSD (combat ), alcoholic, rages (has been sober for the last 6 yrs)    Paternal grandparents are alcoholics    Maternal grandfather, alcoholic    Brother, takes Paxil (doesn't know)    Son, ADHD, (possibly ODD, possibly Bipolar)    Two cousins, bipolar disorder    Mother, anxiety, depression, insomnia, mood swings        PREVIOUS SUICIDE ATTEMPTS: Once when she was teenager, [employer] year in high school, (cut herself). She says that she called a friend to come and help her clean it up and she didn't report it or say anything to anyone else about it. History of cutting (has not done this in a couple of years, says she has a wonderful  who helps her with this). INPATIENT HOSPITALIZATIONS: denies        REHABILITATION:denies     Social Determinants of Health     Financial Resource Strain:     Difficulty of Paying Living Expenses:    Food Insecurity:     Worried About Running Out of Food in the Last Year:     [address] in the Last Year:    Transportation Needs:     Lack of Transportation (Medical):      Lack of Transportation (Non-Medical):    Physical Activity:     Days of Exercise per Week:     Minutes of Exercise per Session:    Stress:     Feeling of Stress :    Social Connections:     Frequency of Communication with Friends and Family:     Frequency of Social Gatherings with Friends and Family:     Attends Confucianism Services:     Active Member of Clubs or Organizations:     Attends Club or Organization Meetings:     Marital Status:    Intimate Partner Violence:     Fear of Current or Ex-Partner:     Emotionally Abused:     Physically Abused:     Sexually Abused:        SCREENINGS        [place] Coma Scale  Eye Opening: Spontaneous  Best Verbal Response: Oriented  Best Motor Response: Obeys commands  [place] Coma Scale Score: 15               PHYSICAL EXAM    (up to 7 for level 4, 8 or more for level 5)     ED Triage Vitals [08/28/21 1448]   BP Temp Temp Source Pulse Resp SpO2 Height Weight   122/74 98.1 °F (36.7 °C) Oral 101 16 98 % -- --       Physical Exam  Vitals reviewed. Constitutional:       General: She is not in acute distress. Appearance: Normal appearance. She is not ill-appearing, toxic-appearing or diaphoretic. HENT:      Nose: Nose normal.      Mouth/Throat:      Mouth: Mucous membranes are moist.      Pharynx: Oropharynx is clear. Cardiovascular:      Rate and Rhythm: Normal rate and regular rhythm. Pulses: Normal pulses. Heart sounds: Normal heart sounds. Pulmonary:      Effort: Pulmonary effort is normal.      Breath sounds: Normal breath sounds. Abdominal:      General: Bowel sounds are normal.      Palpations: Abdomen is soft. Tenderness: There is no abdominal tenderness. Musculoskeletal:      Right lower leg: No edema. Left lower leg: No edema. Skin:     General: Skin is warm and dry. Capillary Refill: Capillary refill takes less than 2 seconds. Neurological:      Mental Status: She is alert and oriented to person, place, and time.          DIAGNOSTIC RESULTS     EKG: All EKG's are interpreted by the Emergency Department Physician who either signs or Co-signs this chart in the absence of a cardiologist.      RADIOLOGY:   Non-plain film images such as CT, Ultrasound and MRI are read by the radiologist. Plain radiographic images are visualized and preliminarily interpreted by the emergency physician with the below findings:        Interpretation per the Radiologist below, if available at the time of this note:    No orders to display         ED BEDSIDE ULTRASOUND:   Performed by ED Physician - none    LABS:  Labs Reviewed   CBC WITH AUTO DIFFERENTIAL - Abnormal; Notable for the following components:       Result Value    MCHC 32.4 (*)     Neutrophils % 65.7 (*)     All other components within normal limits   URINALYSIS - Abnormal; Notable for the following components:    Color, UA DARK YELLOW (*)     Ketones, Urine 15 (*)     Blood, Urine SMALL (*)     Protein, UA TRACE (*)     Leukocyte Esterase, Urine SMALL (*)     All other components within normal limits   MICROSCOPIC URINALYSIS - Abnormal; Notable for the following components:    RBC, UA 6-10 (*)     Bacteria, UA 3+ (*)     Crystals, UA NEG (*)     Hyaline Casts, UA 23 (*)     WBC, UA 23 (*)     All other components within normal limits   COVID-19, RAPID   COMPREHENSIVE METABOLIC PANEL W/ REFLEX TO MG FOR LOW K   PREGNANCY, URINE       All other labs were within normal range or not returned as of this dictation. EMERGENCY DEPARTMENT COURSE and DIFFERENTIAL DIAGNOSIS/MDM:   Vitals:    Vitals:    08/28/21 1448 08/28/21 1555   BP: 122/74 98/66   Pulse: 101 98   Resp: 16 16   Temp: 98.1 °F (36.7 °C)    TempSrc: Oral    SpO2: 98% 92%           MDM      REASSESSMENT          CRITICAL CARE TIME       CONSULTS:  None    PROCEDURES:  Unless otherwise noted below, none     Procedures         FINAL IMPRESSION      1. Acute cystitis without hematuria          DISPOSITION/PLAN   DISPOSITION        PATIENT REFERRED TO:  Kassidy Marcum MD  [id number] [facility]  [phone]    Call in 2 days        DISCHARGE MEDICATIONS:  New Prescriptions    SULFAMETHOXAZOLE-TRIMETHOPRIM (BACTRIM DS) 800-160 MG PER TABLET    Take 1 tablet by mouth 2 times daily for 5 days     Controlled Substances Monitoring:     RX Monitoring 1/27/2021   Attestation The Prescription Monitoring Report for this patient was reviewed today.    Periodic Controlled Substance Monitoring Possible medication side effects, risk of tolerance/dependence & alternative treatments discussed. ;No signs of potential drug abuse or diversion identified.        (Please note that portions of this note were completed with a voice recognition program.  Efforts were made to edit the dictations but occasionally words are mis-transcribed.)    CHRIS Hollingsworth CNP (electronically signed)  Attending Emergency Physician         CHRIS Hollingsworth CNP  08/28/21 [id number]

## 2021-08-30 ENCOUNTER — OFFICE VISIT (OUTPATIENT)
Dept: PSYCHIATRY | Age: 31
End: 2021-08-30
Payer: COMMERCIAL

## 2021-08-30 DIAGNOSIS — F31.81 BIPOLAR 2 DISORDER (HCC): Primary | ICD-10-CM

## 2021-08-30 PROCEDURE — 90837 PSYTX W PT 60 MINUTES: CPT | Performed by: SOCIAL WORKER

## 2021-08-30 NOTE — PATIENT INSTRUCTIONS
These stages are meant as a guide to help you understand your reactions and those of others who are grieving.  - Denial:  Denial (not acknowledging the loss) can help contain the shock of loss. Denial can act as a safety mechanism to block out grief until we are ready to handle it. - Sadness and depression:  Deep, intense grief and mourning appear during this stage. When the full understanding of our loss comes, it can seem overwhelming. During this stage, you may cry often and unexpectedly. You may not want to be around people or to do things that you normally enjoy. During this stage, it is best to remain as active as possible and to seek supportive people who will allow you to say what you need to or to cry when you need to. It is important to allow yourself to work through your full range and experience of emotions. - Anger:  Rage and anger can be intense toward the person who , toward friends and relatives, and even toward God. It is important to have an outlet to release anger through activities such as exercise, hobbies, or through therapy. Guilt, shame, and blame are feelings that need to be addressed, especially if it is toward you. - Acceptance: This stage includes coming to terms with the loss. It does not mean that you have found the answers to your questions or that you stop thinking about the person who is gone. It does signify a reinvestment in life and a willingness to readjust to your new circumstances while carrying the memory of your loved one with you. How to Help Yourself  1. Give yourself time to grieve. It is normal and important to express your grief and to work through the concerns that arise for you at this time. Stuffing your feelings may not be helpful and may delay or prolong your grief. 2. Find supportive people to reach out to during your grief. This is the time when the support of others may be the most helpful.   Dont be afraid to tell them how they can best help, even if it means just listening. It is often very helpful to talk about your loss with people who will allow you to express your emotions. 3. Take care of your health. Often after a loss, we stop doing the things we need to for health care, such as exercising, eating correctly, keeping Dr. appointments, or taking prescribed medications. If you are on a health care regimen, it is important to continue to adhere to your treatment. 4. Postpone major life changes. Give yourself time to adjust to your loss before making plans to change jobs, move or sell your home, remarry, etc.  Grief can sometimes cloud your judgment and ability to make decisions. 5. Consider keeping a journal.  It is often helpful to write or tell the story of your loss and what it means to you as a way to work through your feelings. 6. Participate in activities. Staying active through exercise, enjoyable activities, outings with supportive others, or even starting new hobbies can help us get through tough times while providing opportunities for constructive development and use of energy. 7. Find a way to memorialize your loved one. Planting a tree or garden in the name of your loved one, dedicating a work to their memory, contributing to a [place] in their name, and other such activities can be helpful. 8. Consider joining grief-support groups or contacting a grief counselor for additional support and help. Remember that depressive symptoms (feeling sad) are a fundamental part of normal bereavement. Staying active and finding support from others can help you to work through the grief process. Grief Tips - Help for Those Who Mourn    The following are many ideas to help people who are mourning a loved ones death. Different kinds of losses dictate different responses, so not all of these ideas will suit everyone. Likewise, no two people grieve alike - what works for one may not work for another.   Treat this list for what it is; a gathering of assorted suggestions that various people have tried with success. Perhaps what helped them will help you. The emphasis here is on specific, practical ideas. 1. Talk regularly with a friend. Talking with another about what you think and feel is one of the best things you can do for yourself. It helps relieve some of the pressure you may feel, it can give you a sense of perspective, and it keeps you in touch with others. Look for someone whos a good listener and who has a caring soul. Then speak whats on your mind and in your heart. If this feels one-sided let that be okay for this period of your life. Chances are the other person will find meaning in what theyre doing, and time will come when youll have the chance to be a good listener for someone else. Youll be a better listener then, if youre a good talker now. 2. Walk. Go for walks outside every day if you can. Dont overdo it, but walk briskly enough that it feels invigorating. Sometimes try walking slowly enough so you can look carefully at what you see. Observe what nature has to offer you, what it can teach you. Enjoy as much as you are able to of the sights and sounds that come your way. If you like, walk with another person. 3. Carry or wear a linking object. Carry something in your pocket or purse that reminds you of the one who  - a keepsake they gave you perhaps, or small object they once carried or used or a memento you select for just this purpose. You might wear a piece of their jewelry in the same way. Whenever you want, reach for gaze upon this object and remember what it signifies. 4. Visit the grave. Not all people prefer to do this. But if it feels right to you, then do so. Dont let others convince you this is a morbid thing to do. Spend whatever time feels right there.   Stand or sit in the quietness and do what comes naturally: be silent or talk, breathe deeply or cry, recollect or pray. You may wish to add your distinctive touch to the gravesite - straighten it a bit, or add little signs of your love. 5. Create a memory book. Compile photographs, which document your loved ones life. Arrange them into some sort of order so they tell a story. Add other elements if you want: diplomas, newspaper clippings, awards, accomplishments, and reminders of significant events. Put all this in a special binder and keep it for other people to look at if they wish. Go through it on your own if you desire. Reminisce as you do so. 6. Recall your dreams. Your dreams often have important things to say about your feelings and about your relationship with the one who . Your dreams may be scary or sad, especially early on. They may seem weird or crazy to you. You may find that your loved one appears in your dreams. Accept your dreams for what they are and see what you can learn from them. No one knows that better than you. 7. Tell people what helps you and what doesnt. People around you may not understand what you need. So tell them. If hearing your loved ones name spoken aloud by others feels good, say so. If you need more time alone, or assistance with chores youre unable to complete, or an occasional hug, be honest.  People cant read your mind, so youll have to speak it. 8. Write things down. Most people who are grieving become more forgetful than usual.  So help yourself remember what you want by keeping track of it on paper or with whatever system works best for you. This may include writing down things you want to preserve about the person who has . 9. Ask for a copy of the Rina's. If the  liturgy or memorial service holds special meaning for you because of what was spoken or read, ask for the words. Whoever participated in that ritual will feel gratified that what they prepared was appreciated.   Turn to these words whenever you want.  Some people find these thoughts provide even more help weeks and months after the service. 10. Remember the serenity prayer. This prayer is attributed to theologian Nettie Carlos, but its actually an ancient  [facility]. It has brought comfort and support to many that have suffered various kinds of afflictions. 11. Create a memory area at home. In a space that feels appropriate, arrange a small table that honors the person: a framed photograph or two, perhaps a prized possession or award or something they created or something they loved. This might be placed on a small table, a mantel or a desk. Some people like to use a grouping of candles, representing not just the person who  but others who have  as well. In that case a variety of candles can be arranged each representing a unique life. 12. Drink water. Grieving people can easily become dehydrated. Crying can naturally lead to that. And with your normal routines turned upside down, you may simply not drink as much or as regularly as you did before this death. Make this a way you care for yourself. 13. Use your hands. Sometimes theres value in doing repetitive things with your hands, something you dont have to think about very much because it becomes second nature. Knitting and crocheting are like that. So are carving, woodworking, polishing, solving jigsaw puzzles, painting, braiding, shoveling, washing and countless other activities. 15. Give yourself respites from your grief. Just because youre grieving doesnt mean you must always be feeling sad or     forlorn. Theres value in sometimes consciously deciding that youll think about something else for awhile, or that youll do something youve always enjoyed doing. Sometimes this happens naturally and its only later you realize that your grief has taken a back seat.   Let it, this is not an indication you love that person any less or that youre forgetting them.  Its a sign that youre human and you need relief from the unrelenting pressure. It can also be a healthy sign youre healing. 15. See a grief counselor. If youre concerned about how youre feeling and how well youre adapting make an appointment   with a counselor who specializes in grief. Often youll learn what you need both about grief and about yourself as a griever in only a few sessions. Ask questions of the counselor before you sign on. What specific training does he or she have? What accreditation? A person who is a family therapist or a psychologist doesnt necessarily understand the unique issues of someone in grief. 12. Begin your day with your loved one. If your grief is young, youll probably wake up thinking of that person anyway. So why not decide that youll include her or him from the start? Focus this time in a positive way. Bring to your mind fulfilling memories. Recall lessons that this person taught you, gifts he or she gave you. Think about how you can spend your day in ways that would be keeping in with your loved ones best self and with your best self. Then carry that best self with you through your day. 16. Invite some one to be your telephone aleksandr. If your grief and sadness hit you especially hard at times and you have no   one nearby to turn to, ask someone you trust to be your telephone aleksandr. Ask their permission for you to call them whenever you feel youre at loose ends, day or night. Then put their number beside your phone and call them if you need them. Dont abuse the privilege, of course. And covenant that some day it will be payback time - someday youll make yourself available to help someone else in the same way youve been helped. That will help you accept the care youre receiving. 18. Avoid certain people if you must.  No one likes to be unfriendly or cold.   But if there are people in your life who make it  very difficult for you to representative of your hector, or even anothers hector. Consider becoming a spiritual friend with another and making your time of grieving a time of personal exploring. Read of how others have responded to a loved ones death. You may feel that your own grief is all you can handle. But if youd like to look at the ways others have done it, try:  Beyond Grief:  A Guide for Recovering from the Death of a Loved One by Nichole 40 by Ervin Bermudez and Feliz Hernandez  The Grief Recovery Handbook: The Action Program for Moving Beyond Death, Divorce, & Other Losses by Salma BUNN Grief Observed by Laodnna Irizarry  by Valerie Ortiz When Good-Bye Is Forever by Silvestre Ibarra  Men and Grief by Ronda Bah or [address] for a Son. There are many others. Check with a . 22. Learn about your loved one from others. Listen to the stories others have to tell about the one, who , stories youre familiar with and those youve never heard before. Spend time with their friends, schoolmates or colleagues. Invite them into your home. Solicit the writings of others. Preserve whatever you find out. Celebrate your time together. 26. Take a day off. When the mood is just right, take a one-day vacation. Do whatever you want, or dont do whatever you want. Travel somewhere or stay inside by yourself. Be very active or dont do anything at all. Just make it your day, whatever that means for you. 32. Invite someone to give you feedback. Select someone you trust, preferably someone familiar with the working of grief, to give you his or her reaction when you ask for it. If you want to check out how clearly youre thinking, how accurately youre remembering, how effectively youre coping, go to that person. Pose your questions, then listen to their responses.   What you choose to do with that information will be up to you.    28. Vent your anger rather than hold it in. You may feel awkward being angry when youre grieving, but anger is a common reaction. The expression holds true: anger is best out floating rather than in bloating. Even if you feel a bit ashamed as you do it, find ways to get it out of your system. [place], even if its in an empty house. Cry. Hit something soft. Throw eggs at something hard. Vacuum up a storm. Resist the temptation to be proper. 29. Give thanks every day. Whatever has happened to you, you still have things to be thankful for. Perhaps its your memories, your remaining family, your support, your work; you own health - all sorts of things. Draw your attention to those parts of life that are worth appreciating, then appreciate them. 30. Monitor signs of dependency. While its normal to become more dependent upon others for awhile immediately after a death, it will not be helpful to continue in that role long-term. Watch for signs that youre prolonging your need for assistance. Congratulate yourself when you do things for yourself.
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
## 2021-09-03 RX ORDER — QUETIAPINE FUMARATE 50 MG/1
50 TABLET, FILM COATED ORAL NIGHTLY
Qty: 30 TABLET | Refills: 2 | Status: SHIPPED | OUTPATIENT
Start: 2021-09-03 | End: 2021-10-27 | Stop reason: SDUPTHER

## 2021-09-03 NOTE — TELEPHONE ENCOUNTER
Argelia Giraldo called to request refill on her medication      Last office visit: 8/30/2021  Next office visit: 10/20/2021    Requested Prescriptions     Pending Prescriptions Disp Refills    QUEtiapine (SEROQUEL) 50 MG tablet 30 tablet 2     Sig: Take 1 tablet by mouth nightly        Office Visit  8/25/2021  [address] Psychiatry Emigdio De MD  Psychiatry  Bipolar II disorder, mild, depressed, with anxious distress ([address] +3 more  Dx  Medication Check  Reason for Visit   Progress Notes  Lucien Mathis MD (Physician) [facility]  Expand AllCollapse All  []Expand All by [facility]  8/25/2021 1:53 PM                             Progress Note           Argelia Giraldo 1990                             Chief Complaint   Patient presents with    Medication Check            Subjective:    76-year-old white female with history of bipolar disorder, anxiety, panic attacks, presents for follow-up. Currently kept on Lamictal, Wellbutrin, Seroquel. Seroquel was increased last time to help with sleep. Patient reports compliance.       Bright affect today. Doing well overall, sleeping better. Poor appetite. Feels that Topamax may be causing poor appetite - will discuss with Neurology. Working on filing divorce papers. Coping well. Good family support.      Current Substance Use: Never had any issues with a stimulant, opioid or benzodiazepine abuse. Drank too much in the past.  Currently avoiding alcohol.  Smokes cigarettes.      BP: /65   Pulse 105   Temp 98.6 °F (37 °C)   Wt 146 lb (66.2 kg)   SpO2 97%   BMI 24.30 kg/m²         Review of Systems - 14 point review:  All negative:      Constitutional: (fevers, chills, night sweats, wt loss/gain, change in appetite, fatigue, somnolence)     HEENT: (ear pain or discharge, hearing loss, ear ringing, sinus pressure, nosebleed, nasal discharge, sore throat, oral sores, tooth pain, bleeding gums, hoarse voice, neck pain)      Cardiovascular: (HTN, chest pain, elevated cholesterol/lipids, palpitations, leg swelling, leg pain with walking)     Respiratory: (cough, wheezing, snoring, SOB with activity (dyspnea), SOB while lying flat (orthopnea), awakening with severe SOB (paroxysmal nocturnal dyspnea))     Gastrointestinal: (NVD, constipation, abdominal pain, bright red stools, black tarry stools, stool incontinence)     Genitourinary:  (pelvic pain, burning or frequency of urination, urinary urgency, blood in urine incomplete bladder emptying, urinary incontinence, STD; MEN: testicular pain or swelling, erectile dysfunction; WOMEN: LMP, heavy menstrual bleeding (menorrhagia), irregular periods, postmenopausal bleeding, menstrual pain (dymenorrhea, vaginal discharge)     Musculoskeletal: (bone pain/fracture, joint pain or swelling, musle pain)     Integumentary: (rashes, acne, non-healing sores, itching, breast lumps, breast pain, nipple discharge, hair loss)     Neurologic: (HA, muscle weakness, paresthesias (numbness, coldness, crawling or prickling), memory loss, seizure, dizziness)     Psychiatric:  (anxiety, sadness, irritability/anger, insomnia, suicidality)     Endocrine: (heat or cold intolerance, excessive thirst (polydipsia), excessive hunger (polyphagia))     Immune/Allergic: (hives, seasonal or environmental allergies, HIV exposure)     Hematologic/Lymphatic: (lymph node enlargement, easy bleeding or bruising)     History obtained via chart review and patient     PCP is Alejandrina Diamond MD         Current Meds:     Home Medications           Prior to Admission medications    Medication Sig Start Date End Date Taking?  Authorizing Provider   QUEtiapine (SEROQUEL) 50 MG tablet Take 1 tablet by mouth nightly 6/30/21 7/30/21   Monty Inman MD   butalbital-APAP-caffeine -40 MG CAPS per capsule TAKE ONE CAPSULE TAKE CAPSULE BY MOUTH EVERY 6 HOURS AS NEEDED FOR HEADACHES 6/23/21     Zoey Granados MD   tranexamic acid (LYSTEDA) 650 MG TABS BILITOT <0.2 01/27/2021     ALKPHOS 72 01/27/2021     AST 10 01/27/2021     ALT 7 01/27/2021     LABGLOM >60 01/27/2021     GFRAA >59 01/27/2021            Lab Results   Component Value Date      01/27/2021     K 3.7 01/27/2021      01/27/2021     CO2 19 01/27/2021     BUN 10 01/27/2021     CREATININE 0.6 01/27/2021     GLUCOSE 116 01/27/2021     CALCIUM 8.9 01/27/2021      No results found for: CHOL  No results found for: TRIG  No results found for: HDL  No results found for: LDLCHOLESTEROL, LDLCALC  No results found for: LABVLDL, VLDL  No results found for: CHOLHDLRATIO  No results found for: LABA1C  No results found for: EAG  No results found for: TSHFT4, TSH  No results found for: VITD25     Assessments Administered:        Assessment:    1. Bipolar II disorder, mild, depressed, with anxious distress ([facility]    2. Generalized anxiety disorder with panic attacks    3. Chronic post-traumatic stress disorder (PTSD)    4. Insomnia due to other mental disorder          No evidence of acute suicidality, homicidality or psychosis observed today. Psychiatrically stable.     Plan:  1. Continue current regimen without changes. The risks, benefits, side effects, indications, contraindications, and adverse effects of the medications have been discussed. Yes.  2. The pt has verbalized understanding and has capacity to give informed consent. 3. The Carmen Roque report has been reviewed according to [facility] regulations. 4. Supportive therapy offered. Patient will continue seeing her therapist at the clinic. 5. Follow up:    Return in about 2 months (around 10/25/2021). 6. The patient has been advised to call with any problems. 7. Controlled substance Treatment Plan: NA  8.  The above listed medications have been continued, modifications in meds and other orders/labs as follows:                   Encounter Medications    No orders of the defined types were placed in this encounter.                  No orders of the defined types were placed in this encounter.        9. Additional comments: Consider checking thyroid function and vitamin B12 and D levels next time.        10. Over 50% of the total visit time of  35  minutes was spent on counseling and/or coordination of care of:                         1. Bipolar II disorder, mild, depressed, with anxious distress ([facility]    2. Generalized anxiety disorder with panic attacks    3. Chronic post-traumatic stress disorder (PTSD)    4. Insomnia due to other mental disorder          Mauricia Apley, MD            Instructions       Return in about 2 months (around 10/25/2021). After Visit Summary (Printed 8/25/2021)  Additional Documentation    Vitals:  /65   Pulse 105   Temp 98.6 °F (37 °C)   Wt 146 lb (66.2 kg)   SpO2 97%   BMI 24.30 kg/m²   BSA 1.74 m²   Flowsheets:  Vitals Reassessment,   Calorie Assessment      Encounter Info:  Billing Info,   Allergies,   Detailed Report,   History,   Medications,   Questionnaires      Travel Screening   Screenings since 08/24/21 0000  08/28/21 1448      In the last month, have you been in contact with someone who was confirmed or suspected to have Coronavirus / COVID-19? No / Sadie Bhatt RN   Have you had a COVID-19 viral test in the last 14 days? No Maikel Perez RN   Do you have any of the following new or worsening symptoms? None of these Maikel Perez RN   Have you traveled internationally or domestically in the last month? No Maikel Perez RN   Travel History   Travel since 08/04/21   No documented travel since 08/04/21   Media  From this encounter  Scan on 8/24/2021 9:39 AM by Timothy Soulier, RN: Latosha Mckeon 8/24/21Scan on 8/24/2021 9:39 AM by Timothy Soulier, RN: Latosha Mckeon 8/24/21   Chart Review Routing History    No routing history on file.   Encounter Status    Signed by Mauricia Apley, MD on 8/25/21 at 13:54   BestPractice Advisories    Click to view BestPractice Advisory history   Encounter Messages    Read Composed From To  Subject   Y 8/18/2021  6:01 AM Armand Hogan  [facility]coming [facility] 8[facility]25/21   Y 6/30/2021  2:30 PM Armand Hogan  Appointment Scheduled   Orders Placed     None  Outpatient Medications at End of Encounter as of 8/25/2021    QUEtiapine (SEROQUEL) 50 MG tablet Take 1 tablet by mouth nightly   butalbital-APAP-caffeine -40 MG CAPS per capsule TAKE ONE CAPSULE TAKE CAPSULE BY MOUTH EVERY 6 HOURS AS NEEDED FOR HEADACHES   tranexamic acid (LYSTEDA) 650 MG TABS tablet Take 2 tablets by mouth 3 times daily   AIMOVIG 140 MG/ML SOAJ INJECT 140 MG INTO THE SKIN EVERY 30 DAYS   triamcinolone (KENALOG) 0.025 % cream Apply topically 2 times daily.    topiramate (TOPAMAX) 100 MG tablet TAKE 1 TABLET BY MOUTH TWO TIMES A DAY   buPROPion (WELLBUTRIN XL) 150 MG extended release tablet TAKE 1 TABLET BY MOUTH ONE TIME A DAY IN THE MORNING   lamoTRIgine (LAMICTAL) 150 MG tablet Take 1 tablet by mouth daily   Medication Changes       None     Medication List   Visit Diagnoses       Bipolar II disorder, mild, depressed, with anxious distress (HCC)     Generalized anxiety disorder with panic attacks     Chronic post-traumatic stress disorder (PTSD)     Insomnia due to other mental disorder     Problem List

## 2021-09-09 ENCOUNTER — OFFICE VISIT (OUTPATIENT)
Dept: FAMILY MEDICINE CLINIC | Age: 31
End: 2021-09-09
Payer: COMMERCIAL

## 2021-09-09 VITALS
TEMPERATURE: 98.5 F | DIASTOLIC BLOOD PRESSURE: 82 MMHG | OXYGEN SATURATION: 98 % | SYSTOLIC BLOOD PRESSURE: 124 MMHG | WEIGHT: 148 LBS | BODY MASS INDEX: 24.66 KG/M2 | HEART RATE: 84 BPM | HEIGHT: 65 IN

## 2021-09-09 DIAGNOSIS — F31.81 BIPOLAR 2 DISORDER (HCC): ICD-10-CM

## 2021-09-09 DIAGNOSIS — L30.9 DERMATITIS: ICD-10-CM

## 2021-09-09 DIAGNOSIS — M26.643 ARTHRITIS OF BOTH TEMPOROMANDIBULAR JOINTS: Primary | ICD-10-CM

## 2021-09-09 PROCEDURE — 99214 OFFICE O/P EST MOD 30 MIN: CPT | Performed by: FAMILY MEDICINE

## 2021-09-09 RX ORDER — CYCLOBENZAPRINE HCL 5 MG
5 TABLET ORAL NIGHTLY PRN
Qty: 30 TABLET | Refills: 0 | Status: SHIPPED | OUTPATIENT
Start: 2021-09-09 | End: 2021-10-06

## 2021-09-09 NOTE — PROGRESS NOTES
[address] [address]  [address] [address]  Dept: [phone]  Dept Fax: [phone]  Loc: [phone]    Subjective:     Moose Castro is a 27 y.o. female who presents today for her medical conditions/complaints as noted below. Moose Castro is c/o of 3 Month Follow-Up        HPI:   Patient presents for follow-up of TMJ arthritis, poison ivy. She states that her right TMJ has been painful and bothersome for last 4 days. She denies eating anything out of the ordinary. She has done all of the things we previously discussed, as far as exercises, applying ice and heat, anti-inflammatories etc. She sees psychiatry here and is on medications per them. Her poison ivy is much better, she states that basically resolved within 2 days of starting steroids. Eczema stable. After her appointment, I read that she went to the ED for UTI on 8/28, records reviewed. She has presumably completed the Bactrim DS, did not mention any dysuria. PHQ Scores 5/3/2021 1/25/2021   PHQ2 Score 0 1   PHQ9 Score 0 4     Interpretation of Total Score Depression Severity: 1-4 = Minimal depression, 5-9 = Mild depression, 10-14 = Moderate depression, 15-19 = Moderately severe depression, 20-27 = Severe depression     BRETT 7 SCORE 1/25/2021   BRETT-7 Total Score 2     Interpretation of BRETT-7 score: 5-9 = mild anxiety, 10-14 = moderate anxiety, 15+ = severe anxiety. Recommend referral to behavioral health for scores 10 or greater.      Past Medical History:   Diagnosis Date    Anxiety     Bipolar 2 disorder ([address] 01/25/2021    Headache(784.0)     Headache, paroxysmal hemicrania, episodic     History of posttraumatic stress disorder (PTSD) 12/01/2020    Migraine     Tension headache      Past Surgical History:   Procedure Laterality Date    ADENOIDECTOMY      CHOLECYSTECTOMY, LAPAROSCOPIC  11/5/12    TUBAL LIGATION         Family History   Problem Relation Age of Onset    Kidney stones Mother     Hypertension Father     Cancer Maternal Grandmother         cervical    Heart Failure Maternal Grandmother     Diabetes Paternal Grandfather     High Blood Pressure Paternal Grandfather     Diabetes Maternal Aunt     Cancer Maternal Cousin         cervical    Cancer Maternal Cousin         cervical       Social History     Tobacco Use    Smoking status: Current Every Day Smoker     Packs/day: 1.00     Types: Cigarettes     Start date: 2005    Smokeless tobacco: Never Used   Substance Use Topics    Alcohol use: Yes     Comment: rarely      Current Outpatient Medications   Medication Sig Dispense Refill    cyclobenzaprine (FLEXERIL) 5 MG tablet Take 1 tablet by mouth nightly as needed for Muscle spasms (or TMJ) 30 tablet 0    QUEtiapine (SEROQUEL) 50 MG tablet Take 1 tablet by mouth nightly 30 tablet 2    butalbital-APAP-caffeine -40 MG CAPS per capsule TAKE ONE CAPSULE TAKE CAPSULE BY MOUTH EVERY 6 HOURS AS NEEDED FOR HEADACHES 40 capsule 2    tranexamic acid (LYSTEDA) 650 MG TABS tablet Take 2 tablets by mouth 3 times daily 30 tablet 5    AIMOVIG 140 MG/ML SOAJ INJECT 140 MG INTO THE SKIN EVERY 30 DAYS 3 pen 3    triamcinolone (KENALOG) 0.025 % cream Apply topically 2 times daily. 80 g 0    topiramate (TOPAMAX) 100 MG tablet TAKE 1 TABLET BY MOUTH TWO TIMES A DAY 60 tablet 5    buPROPion (WELLBUTRIN XL) 150 MG extended release tablet TAKE 1 TABLET BY MOUTH ONE TIME A DAY IN THE MORNING 90 tablet 1    lamoTRIgine (LAMICTAL) 150 MG tablet Take 1 tablet by mouth daily (Patient taking differently: Take 200 mg by mouth daily ) 90 tablet 1     No current facility-administered medications for this visit. Allergies   Allergen Reactions    Morphine Other (See Comments)     Produces headaches for about 4-5 days. Review of Systems   Constitutional: Negative for chills and fever. HENT: Negative for facial swelling and mouth sores.     Eyes: Negative for discharge and itching. Respiratory: Negative for apnea and stridor. Cardiovascular: Negative for chest pain and palpitations. Gastrointestinal: Negative for nausea and vomiting. Endocrine: Negative for cold intolerance and heat intolerance. Genitourinary: Negative for frequency and urgency. Musculoskeletal: Positive for arthralgias. Negative for back pain. Skin: Negative for color change and rash. See HPI for visit specific review of symptoms. All others negative      Objective:   /82   Pulse 84   Temp 98.5 °F (36.9 °C)   Ht 5' 5\" (1.651 m)   Wt 148 lb (67.1 kg)   SpO2 98%   BMI 24.63 kg/m²   Physical Exam  Vitals reviewed. Constitutional:       Appearance: She is well-developed. HENT:      Head: Normocephalic. Jaw: Jaw movement pain: TMJ b/l. Eyes:      Conjunctiva/sclera: Conjunctivae normal.      Pupils: Pupils are equal, round, and reactive to light. Cardiovascular:      Rate and Rhythm: Normal rate and regular rhythm. Heart sounds: Normal heart sounds. Pulmonary:      Effort: Pulmonary effort is normal. No respiratory distress. Breath sounds: Normal breath sounds. Abdominal:      General: Bowel sounds are normal. There is no distension. Palpations: Abdomen is soft. Tenderness: There is no abdominal tenderness. Musculoskeletal:         General: Normal range of motion. Cervical back: Normal range of motion and neck supple. Skin:     General: Skin is warm and dry. Capillary Refill: Capillary refill takes less than 2 seconds. Neurological:      Mental Status: She is alert and oriented to person, place, and time. Cranial Nerves: No cranial nerve deficit.    Psychiatric:         Behavior: Behavior normal.           Lab Review   Recent Results (from the past 672 hour(s))   Urinalysis, reflex to microscopic    Collection Time: 08/28/21  3:45 PM   Result Value Ref Range    Color, UA DARK YELLOW (A) Straw/Yellow    Clarity, UA Clear Clear    Glucose, Ur Negative Negative mg/dL    Bilirubin Urine Negative Negative    Ketones, Urine 15 (A) Negative mg/dL    Specific Gravity, UA 1.025 1.005 - 1.030    Blood, Urine SMALL (A) Negative    pH, UA 5.5 5.0 - 8.0    Protein, UA TRACE (A) Negative mg/dL    Urobilinogen, Urine 1.0 <2.0 E.U./dL    Nitrite, Urine Negative Negative    Leukocyte Esterase, Urine SMALL (A) Negative   Pregnancy, Urine    Collection Time: 08/28/21  3:45 PM   Result Value Ref Range    HCG(Urine) Pregnancy Test Negative    Microscopic Urinalysis    Collection Time: 08/28/21  3:45 PM   Result Value Ref Range    RBC, UA 6-10 (A) 0 - 2 /HPF    Bacteria, UA 3+ (A) None Seen /HPF    Crystals, UA NEG (A) None Seen /HPF    Hyaline Casts, UA 23 (H) 0 - 8 /HPF    WBC, UA 23 (H) 0 - 5 /HPF    Epithelial Cells, UA 2 0 - 5 /HPF   CBC Auto Differential    Collection Time: 08/28/21  3:48 PM   Result Value Ref Range    WBC 9.3 4.8 - 10.8 K/uL    RBC 4.55 4.20 - 5.40 M/uL    Hemoglobin 13.8 12.0 - 16.0 g/dL    Hematocrit 42.6 37.0 - 47.0 %    MCV 93.6 81.0 - 99.0 fL    MCH 30.3 27.0 - 31.0 pg    MCHC 32.4 (L) 33.0 - 37.0 g/dL    RDW 14.4 11.5 - 14.5 %    Platelets 889 919 - 673 K/uL    MPV 11.5 9.4 - 12.3 fL    Neutrophils % 65.7 (H) 50.0 - 65.0 %    Lymphocytes % 25.2 20.0 - 40.0 %    Monocytes % 5.3 0.0 - 10.0 %    Eosinophils % 3.0 0.0 - 5.0 %    Basophils % 0.6 0.0 - 1.0 %    Neutrophils Absolute 6.1 1.5 - 7.5 K/uL    Immature Granulocytes # 0.0 K/uL    Lymphocytes Absolute 2.3 1.1 - 4.5 K/uL    Monocytes Absolute 0.50 0.00 - 0.90 K/uL    Eosinophils Absolute 0.30 0.00 - 0.60 K/uL    Basophils Absolute 0.10 0.00 - 0.20 K/uL   Comprehensive Metabolic Panel w/ Reflex to MG    Collection Time: 08/28/21  3:48 PM   Result Value Ref Range    Sodium 139 136 - 145 mmol/L    Potassium reflex Magnesium 3.6 3.5 - 5.0 mmol/L    Chloride 100 98 - 111 mmol/L    CO2 28 22 - 29 mmol/L    Anion Gap 11 7 - 19 mmol/L    Glucose 95 74 - 109 mg/dL    BUN 8 6 - 20 mg/dL    CREATININE 0.5 0.5 - 0.9 mg/dL    GFR Non-African American >60 >60    GFR African American >59 >59    Calcium 9.2 8.6 - 10.0 mg/dL    Total Protein 7.6 6.6 - 8.7 g/dL    Albumin 4.6 3.5 - 5.2 g/dL    Total Bilirubin <0.2 0.2 - 1.2 mg/dL    Alkaline Phosphatase 88 35 - 104 U/L    ALT 10 5 - 33 U/L    AST 20 5 - 32 U/L   COVID-19, Rapid    Collection Time: 08/28/21  3:50 PM    Specimen: Nasopharyngeal Swab   Result Value Ref Range    SARS-CoV-2, NAAT Not Detected Not Detected               Assessment & Plan: The following diagnoses and conditions are stable with no further orders unless indicated:    Patient Active Problem List    Diagnosis Date Noted    Arthritis of both temporomandibular joints 05/09/2021     Overview Note:     Discussed preventive measures, info given. We will add Flexeril, discussed potential med interactions but probably much less than a TCA. Encouraged her to get nightguard, she is considering a sports mouthguard which is not optimal but reasonable to try.  Dermatitis 05/09/2021     Overview Note:     Trial Kenalog for possible eczema      Ovarian cyst 05/03/2021    Bipolar 2 disorder ([facility] [address] 01/25/2021    Chronic tension-type headache, intractable 11/19/2018    Chronic paroxysmal hemicrania, intractable 05/03/2018    Migraine         Sharmila Rich was seen today for 3 month follow-up. Diagnoses and all orders for this visit:    Arthritis of both temporomandibular joints  -     cyclobenzaprine (FLEXERIL) 5 MG tablet;  Take 1 tablet by mouth nightly as needed for Muscle spasms (or TMJ)    Dermatitis    Bipolar 2 disorder [facility])        Health Maintenance   Topic Date Due    Varicella vaccine (1 of 2 - 2-dose childhood series) Never done    Pneumococcal 0-64 years Vaccine (1 of 2 - PPSV23) Never done    COVID-19 Vaccine (1) Never done    Flu vaccine (1) 09/01/2021    Cervical cancer screen  05/26/2026    DTaP/Tdap/Td vaccine (2 - Td or Tdap) 12/03/2028    Hepatitis A vaccine  Aged Out    Hepatitis B vaccine  Aged Out    Hib vaccine  Aged Out    Meningococcal (ACWY) vaccine  Aged Out    Hepatitis C screen  Discontinued    HIV screen  Discontinued         Return in about 1 month (around 10/9/2021) for TMJ (Flexeril), office. Discussed use, benefit, and side effects of prescribed medications. All patient questions answered. Pt voiced understanding. Reviewed health maintenance. Instructed to continue current medications, diet and exercise. Patient agreedwith treatment plan. Follow up as directed. Old records reviewed, where available.     Denise Preston MD    Note:  dictated using Dragon software

## 2021-09-10 ASSESSMENT — ENCOUNTER SYMPTOMS
APNEA: 0
EYE DISCHARGE: 0
EYE ITCHING: 0
STRIDOR: 0
VOMITING: 0
COLOR CHANGE: 0
FACIAL SWELLING: 0
BACK PAIN: 0
NAUSEA: 0

## 2021-09-13 ENCOUNTER — APPOINTMENT (OUTPATIENT)
Dept: CT IMAGING | Age: 31
End: 2021-09-13
Payer: COMMERCIAL

## 2021-09-13 ENCOUNTER — HOSPITAL ENCOUNTER (EMERGENCY)
Age: 31
Discharge: HOME | End: 2021-09-14
Payer: COMMERCIAL

## 2021-09-13 VITALS
TEMPERATURE: 98.1 F | DIASTOLIC BLOOD PRESSURE: 69 MMHG | SYSTOLIC BLOOD PRESSURE: 142 MMHG | BODY MASS INDEX: 24.66 KG/M2 | HEART RATE: 98 BPM | OXYGEN SATURATION: 95 % | RESPIRATION RATE: 18 BRPM | WEIGHT: 148 LBS | HEIGHT: 65 IN

## 2021-09-13 DIAGNOSIS — R51.9 ACUTE NONINTRACTABLE HEADACHE, UNSPECIFIED HEADACHE TYPE: Primary | ICD-10-CM

## 2021-09-13 PROCEDURE — 96372 THER/PROPH/DIAG INJ SC/IM: CPT

## 2021-09-13 PROCEDURE — 70450 CT HEAD/BRAIN W/O DYE: CPT

## 2021-09-13 PROCEDURE — 6360000002 HC RX W HCPCS: Performed by: NURSE PRACTITIONER

## 2021-09-13 PROCEDURE — 99282 EMERGENCY DEPT VISIT SF MDM: CPT

## 2021-09-13 RX ORDER — PROMETHAZINE HYDROCHLORIDE 25 MG/ML
25 INJECTION, SOLUTION INTRAMUSCULAR; INTRAVENOUS ONCE
Status: COMPLETED | OUTPATIENT
Start: 2021-09-13 | End: 2021-09-13

## 2021-09-13 RX ORDER — KETOROLAC TROMETHAMINE 30 MG/ML
30 INJECTION, SOLUTION INTRAMUSCULAR; INTRAVENOUS ONCE
Status: COMPLETED | OUTPATIENT
Start: 2021-09-13 | End: 2021-09-13

## 2021-09-13 RX ORDER — DIPHENHYDRAMINE HYDROCHLORIDE 50 MG/ML
25 INJECTION INTRAMUSCULAR; INTRAVENOUS ONCE
Status: COMPLETED | OUTPATIENT
Start: 2021-09-13 | End: 2021-09-13

## 2021-09-13 RX ADMIN — PROMETHAZINE HYDROCHLORIDE 25 MG: 25 INJECTION INTRAMUSCULAR; INTRAVENOUS at 22:47

## 2021-09-13 RX ADMIN — DIPHENHYDRAMINE HYDROCHLORIDE 25 MG: 50 INJECTION, SOLUTION INTRAMUSCULAR; INTRAVENOUS at 22:47

## 2021-09-13 RX ADMIN — KETOROLAC TROMETHAMINE 30 MG: 30 INJECTION, SOLUTION INTRAMUSCULAR; INTRAVENOUS at 22:46

## 2021-09-13 ASSESSMENT — PAIN SCALES - GENERAL
PAINLEVEL_OUTOF10: 8
PAINLEVEL_OUTOF10: 8

## 2021-09-13 ASSESSMENT — PAIN DESCRIPTION - LOCATION: LOCATION: HEAD

## 2021-09-13 ASSESSMENT — PAIN DESCRIPTION - DESCRIPTORS: DESCRIPTORS: ACHING

## 2021-09-14 ASSESSMENT — ENCOUNTER SYMPTOMS: VOMITING: 0

## 2021-09-14 NOTE — ED PROVIDER NOTES
[address] EMERG[facility]Y DEPT  eMERGENCY dEPARTMENT eNCOUnter      Pt Name: Mariana Grimm  MRN: [id number]  [place] 1990  Date of evaluation: 9/13/2021  Provider: CHRIS Farrar    CHIEF COMPLAINT       Chief Complaint   Patient presents with    Headache     told by primary patient have issue with TMJ         HISTORY OF PRESENT ILLNESS   (Location/Symptom, Timing/Onset,Context/Setting, Quality, Duration, Modifying Factors, Severity)  Note limiting factors. Kimberlyn Goods a 27 y.o. female who presents to the emergency department for evaluation of headache. Pt tells me that she has had headache along right side of head associated with earache. She tells me that she has history of TMJ. She denies jaw pain, fever, head injury as well as recent illness. She has had no vision changes, speech problems or lateralizing numbness/weakness. HPI    Nursing Notes were reviewed. REVIEW OF SYSTEMS    (2-9 systems for level 4, 10 or more for level 5)     Review of Systems   Constitutional: Negative for fever. Gastrointestinal: Negative for vomiting. Neurological: Positive for headaches. Negative for weakness and numbness. A complete review of systems was performed and is negative except as noted above in the HPI.        PAST MEDICAL HISTORY     Past Medical History:   Diagnosis Date    Anxiety     Bipolar 2 disorder ([facility] [address] 01/25/2021    Headache(784.0)     Headache, paroxysmal hemicrania, episodic     History of posttraumatic stress disorder (PTSD) 12/01/2020    Migraine     Tension headache          SURGICAL HISTORY       Past Surgical History:   Procedure Laterality Date    ADENOIDECTOMY      CHOLECYSTECTOMY, LAPAROSCOPIC  11/5/12    TUBAL LIGATION           CURRENT MEDICATIONS       Discharge Medication List as of 9/14/2021 12:40 AM      CONTINUE these medications which have NOT CHANGED    Details   cyclobenzaprine (FLEXERIL) 5 MG tablet Take 1 tablet by mouth nightly as needed for Muscle spasms (or TMJ), Sexual activity: Yes     Comment: tubal ligation   Other Topics Concern    Not on file   Social History Narrative    PREVIOUS MEDICATION TRIALS    Lamictal    Celexa (says it made her worse)    Trazodone        PREVIOUS PSYCHIATRIC HISTORY    She says that she has been treated for anxiety/depression off and on since age 20. FAMILY PSYCHIATRIC HISTORY    Father, PTSD (combat ), alcoholic, rages (has been sober for the last 6 yrs)    Paternal grandparents are alcoholics    Maternal grandfather, alcoholic    Brother, takes Paxil (doesn't know)    Son, ADHD, (possibly ODD, possibly Bipolar)    Two cousins, bipolar disorder    Mother, anxiety, depression, insomnia, mood swings        PREVIOUS SUICIDE ATTEMPTS: Once when she was teenager, Andre [employer] year in high school, (cut herself). She says that she called a friend to come and help her clean it up and she didn't report it or say anything to anyone else about it. History of cutting (has not done this in a couple of years, says she has a wonderful  who helps her with this). INPATIENT HOSPITALIZATIONS: denies        REHABILITATION:denies     Social Determinants of Health     Financial Resource Strain:     Difficulty of Paying Living Expenses:    Food Insecurity:     Worried About Running Out of Food in the Last Year:     [address] in the Last Year:    Transportation Needs:     Lack of Transportation (Medical):      Lack of Transportation (Non-Medical):    Physical Activity:     Days of Exercise per Week:     Minutes of Exercise per Session:    Stress:     Feeling of Stress :    Social Connections:     Frequency of Communication with Friends and Family:     Frequency of Social Gatherings with Friends and Family:     Attends [employer] Services:     Active Member of Clubs or Organizations:     Attends Club or Organization Meetings:     Marital Status:    Intimate Partner Violence:     Fear of Current or Ex-Partner:     ED BEDSIDE ULTRASOUND:   Performed by ED Physician - none    LABS:  Labs Reviewed - No data to display    All other labs were within normal range or not returned as of this dictation. RE-ASSESSMENT       CT HEAD:    No acute intracranial hemorrhage, hydrocephalus, edema, or mass effect. Paranasal sinuses and mastoid air cells are clear. EMERGENCY DEPARTMENT COURSE and DIFFERENTIALDIAGNOSIS/MDM:   Vitals:    Vitals:    09/13/21 2142   BP: (!) 142/69   Pulse: 98   Resp: 18   Temp: 98.1 °F (36.7 °C)   TempSrc: Infrared   SpO2: 95%   Weight: 148 lb (67.1 kg)   Height: 5' 5\" (1.651 m)       MDM      CONSULTS:  None    PROCEDURES:  Unless otherwise notedbelow, none     Procedures    FINAL IMPRESSION     1.  Acute nonintractable headache, unspecified headache type          DISPOSITION/PLAN   DISPOSITION Decision To Discharge 09/14/2021 12:25:51 AM      PATIENT REFERRED TO:  Bernardino Espinoza MD  [address] [address]    Schedule an appointment as soon as possible for a visit   As needed      DISCHARGE MEDICATIONS:       Discharge Medication List as of 9/14/2021 12:40 AM          (Pleasenote that portions of this note were completed with a voice recognition program.  Efforts were made to edit the dictations but occasionally words are mis-transcribed.)              Razia Rosales, CHRIS  09/14/21 0100

## 2021-09-20 ENCOUNTER — TELEPHONE (OUTPATIENT)
Dept: PSYCHIATRY | Age: 31
End: 2021-09-20

## 2021-09-20 NOTE — TELEPHONE ENCOUNTER
Pt called to cancel/reschedule her appt for 10/20 with Sandeep Salinas because she had to work. Rescheduled for 10/27 @ 1.     Electronically signed by Nikos Phipps MA on 9/20/2021 at 2:18 PM

## 2021-10-06 ENCOUNTER — OFFICE VISIT (OUTPATIENT)
Dept: FAMILY MEDICINE CLINIC | Age: 31
End: 2021-10-06
Payer: COMMERCIAL

## 2021-10-06 VITALS
TEMPERATURE: 99 F | OXYGEN SATURATION: 99 % | DIASTOLIC BLOOD PRESSURE: 74 MMHG | BODY MASS INDEX: 24.66 KG/M2 | HEART RATE: 84 BPM | HEIGHT: 65 IN | SYSTOLIC BLOOD PRESSURE: 98 MMHG | WEIGHT: 148 LBS

## 2021-10-06 DIAGNOSIS — M26.643 ARTHRITIS OF BOTH TEMPOROMANDIBULAR JOINTS: Primary | ICD-10-CM

## 2021-10-06 DIAGNOSIS — F31.81 BIPOLAR 2 DISORDER (HCC): ICD-10-CM

## 2021-10-06 PROCEDURE — 99214 OFFICE O/P EST MOD 30 MIN: CPT | Performed by: FAMILY MEDICINE

## 2021-10-06 RX ORDER — TIZANIDINE 4 MG/1
4 TABLET ORAL NIGHTLY PRN
Qty: 30 TABLET | Refills: 0 | Status: SHIPPED | OUTPATIENT
Start: 2021-10-06 | End: 2022-01-05

## 2021-10-06 NOTE — PROGRESS NOTES
[id number] [address]  Dept: [phone]  Dept Fax: [phone]  Loc: [phone]    Subjective:     Félix Ivory is a 27 y.o. female who presents today for her medical conditions/complaints as noted below. Félix Ivory is c/o of 1 Month Follow-Up        HPI:   Patient presents for follow-up of TMJ arthritis. She is taking the Flexeril as prescribed, states she does not feel like it is helping. She has she went to emergency room 4 days after her last appointment for headaches and ear pain, notes reviewed. Her CT was negative. She was given Toradol, Benadryl and Phenergan while in the ED, no prescriptions. She is following with psychiatry for her bipolar disorder. She states that she has been seeing Payal Pritchard, and with Ms Ange Tabares switching practices, she does need a new referral from myself. PHQ Scores 5/3/2021 1/25/2021   PHQ2 Score 0 1   PHQ9 Score 0 4     Interpretation of Total Score Depression Severity: 1-4 = Minimal depression, 5-9 = Mild depression, 10-14 = Moderate depression, 15-19 = Moderately severe depression, 20-27 = Severe depression     BRETT 7 SCORE 1/25/2021   BRETT-7 Total Score 2     Interpretation of BRETT-7 score: 5-9 = mild anxiety, 10-14 = moderate anxiety, 15+ = severe anxiety. Recommend referral to behavioral health for scores 10 or greater.      Past Medical History:   Diagnosis Date    Anxiety     Bipolar 2 disorder ([facility] [address] 01/25/2021    Headache(784.0)     Headache, paroxysmal hemicrania, episodic     History of posttraumatic stress disorder (PTSD) 12/01/2020    Migraine     Tension headache      Past Surgical History:   Procedure Laterality Date    ADENOIDECTOMY      CHOLECYSTECTOMY, LAPAROSCOPIC  11/5/12    TUBAL LIGATION         Family History   Problem Relation Age of Onset    Kidney stones Mother     Hypertension Father     Cancer Maternal Grandmother         cervical    Heart Failure Maternal Grandmother     Diabetes Paternal Grandfather     High Blood Pressure Paternal Grandfather     Diabetes Maternal Aunt     Cancer Maternal Cousin         cervical    Cancer Maternal Cousin         cervical       Social History     Tobacco Use    Smoking status: Current Every Day Smoker     Packs/day: 1.00     Types: Cigarettes     Start date: 2005    Smokeless tobacco: Never Used   Substance Use Topics    Alcohol use: Yes     Comment: rarely      Current Outpatient Medications   Medication Sig Dispense Refill    tiZANidine (ZANAFLEX) 4 MG tablet Take 1 tablet by mouth nightly as needed (TMJ pain) 30 tablet 0    butalbital-APAP-caffeine -40 MG CAPS per capsule TAKE ONE CAPSULE TAKE CAPSULE BY MOUTH EVERY 6 HOURS AS NEEDED FOR HEADACHES 40 capsule 2    tranexamic acid (LYSTEDA) 650 MG TABS tablet Take 2 tablets by mouth 3 times daily 30 tablet 5    AIMOVIG 140 MG/ML SOAJ INJECT 140 MG INTO THE SKIN EVERY 30 DAYS 3 pen 3    topiramate (TOPAMAX) 100 MG tablet TAKE 1 TABLET BY MOUTH TWO TIMES A DAY 60 tablet 5    buPROPion (WELLBUTRIN XL) 150 MG extended release tablet TAKE 1 TABLET BY MOUTH ONE TIME A DAY IN THE MORNING 90 tablet 1    lamoTRIgine (LAMICTAL) 150 MG tablet Take 1 tablet by mouth daily (Patient taking differently: Take 200 mg by mouth daily ) 90 tablet 1    QUEtiapine (SEROQUEL) 50 MG tablet Take 1 tablet by mouth nightly 30 tablet 2     No current facility-administered medications for this visit. Allergies   Allergen Reactions    Morphine Other (See Comments)     Produces headaches for about 4-5 days. Review of Systems   Constitutional: Negative for chills and fever. HENT: Negative for facial swelling and mouth sores. Eyes: Negative for discharge and itching. Respiratory: Negative for apnea and stridor. Cardiovascular: Negative for chest pain and palpitations. Gastrointestinal: Negative for nausea and vomiting.    Endocrine: Negative for cold measures, info given. Encouraged her to get nightguard, she is considering a sports mouthguard which is not optimal but reasonable to try. Flexeril ineffective, will switch to Zanaflex.  Dermatitis 05/09/2021     Overview Note:     Trial Kenalog for possible eczema      Ovarian cyst 05/03/2021    [facility]    Chronic tension-type headache, intractable 11/19/2018    Chronic paroxysmal hemicrania, intractable 05/03/2018    Migraine         Tracy Knight was seen today for 1 month follow-up. Diagnoses and all orders for this visit:    Arthritis of both temporomandibular joints  -     tiZANidine (ZANAFLEX) 4 MG tablet; Take 1 tablet by mouth nightly as needed (TMJ pain)    Bipolar 2 disorder ([facility] [address]  -     Roxanne Lewis, [address] Maintenance   Topic Date Due    Varicella vaccine (1 of 2 - 2-dose childhood series) Never done    Pneumococcal 0-64 years Vaccine (1 of 2 - PPSV23) Never done    COVID-19 Vaccine (1) Never done    Flu vaccine (1) 09/01/2021    Cervical cancer screen  05/26/2026    DTaP/Tdap/Td vaccine (2 - Td or Tdap) 12/03/2028    Hepatitis A vaccine  Aged Out    Hepatitis B vaccine  Aged Out    Hib vaccine  Aged Out    Meningococcal (ACWY) vaccine  Aged Out    Hepatitis C screen  Discontinued    HIV screen  Discontinued         Return in about 1 month (around 11/6/2021) for TMJ, in office. Discussed use, benefit, and side effects of prescribed medications. All patient questions answered. Pt voiced understanding. Reviewed health maintenance. Instructed to continue current medications, diet and exercise. Patient agreedwith treatment plan. Follow up as directed. Old records reviewed, where available.     Javon Macdonald MD    Note:  dictated using Dragon software

## 2021-10-10 ASSESSMENT — ENCOUNTER SYMPTOMS
COLOR CHANGE: 0
BACK PAIN: 0
EYE DISCHARGE: 0
NAUSEA: 0
STRIDOR: 0
VOMITING: 0
FACIAL SWELLING: 0
APNEA: 0
EYE ITCHING: 0

## 2021-10-11 ENCOUNTER — OFFICE VISIT (OUTPATIENT)
Dept: PSYCHOLOGY | Age: 31
End: 2021-10-11
Payer: COMMERCIAL

## 2021-10-11 DIAGNOSIS — F31.81 BIPOLAR 2 DISORDER (HCC): Primary | ICD-10-CM

## 2021-10-11 PROCEDURE — 90791 PSYCH DIAGNOSTIC EVALUATION: CPT | Performed by: SOCIAL WORKER

## 2021-10-11 ASSESSMENT — PATIENT HEALTH QUESTIONNAIRE - PHQ9
8. MOVING OR SPEAKING SO SLOWLY THAT OTHER PEOPLE COULD HAVE NOTICED. OR THE OPPOSITE, BEING SO FIGETY OR RESTLESS THAT YOU HAVE BEEN MOVING AROUND A LOT MORE THAN USUAL: 0
SUM OF ALL RESPONSES TO PHQ QUESTIONS 1-9: 8
4. FEELING TIRED OR HAVING LITTLE ENERGY: 3
SUM OF ALL RESPONSES TO PHQ QUESTIONS 1-9: 8
SUM OF ALL RESPONSES TO PHQ9 QUESTIONS 1 & 2: 1
3. TROUBLE FALLING OR STAYING ASLEEP: 3
7. TROUBLE CONCENTRATING ON THINGS, SUCH AS READING THE NEWSPAPER OR WATCHING TELEVISION: 0
10. IF YOU CHECKED OFF ANY PROBLEMS, HOW DIFFICULT HAVE THESE PROBLEMS MADE IT FOR YOU TO DO YOUR WORK, TAKE CARE OF THINGS AT HOME, OR GET ALONG WITH OTHER PEOPLE: 0
9. THOUGHTS THAT YOU WOULD BE BETTER OFF DEAD, OR OF HURTING YOURSELF: 0
1. LITTLE INTEREST OR PLEASURE IN DOING THINGS: 0
SUM OF ALL RESPONSES TO PHQ QUESTIONS 1-9: 8
5. POOR APPETITE OR OVEREATING: 0
6. FEELING BAD ABOUT YOURSELF - OR THAT YOU ARE A FAILURE OR HAVE LET YOURSELF OR YOUR FAMILY DOWN: 1
2. FEELING DOWN, DEPRESSED OR HOPELESS: 1

## 2021-10-11 ASSESSMENT — ANXIETY QUESTIONNAIRES
GAD7 TOTAL SCORE: 5
1. FEELING NERVOUS, ANXIOUS, OR ON EDGE: 2-OVER HALF THE DAYS
4. TROUBLE RELAXING: 0-NOT AT ALL
2. NOT BEING ABLE TO STOP OR CONTROL WORRYING: 1-SEVERAL DAYS
5. BEING SO RESTLESS THAT IT IS HARD TO SIT STILL: 0-NOT AT ALL
7. FEELING AFRAID AS IF SOMETHING AWFUL MIGHT HAPPEN: 0-NOT AT ALL
3. WORRYING TOO MUCH ABOUT DIFFERENT THINGS: 2-OVER HALF THE DAYS
6. BECOMING EASILY ANNOYED OR IRRITABLE: 0-NOT AT ALL

## 2021-10-11 NOTE — PATIENT INSTRUCTIONS
cultures its considered wildly  inappropriate to express emotions publicly. In other cultures, emotional  expression is encouraged. [place] Exploration    Think about a person, or a group of people, with whom you struggle to set healthy boundaries. This could mean that your boundaries are too rigid (you keep this person at a distance), too porous (you open up too much), or theres some other problem that isnt so easily labeled. Who do you struggle to set healthy boundaries with? (e.g. my  or coworkers):____________________________________________________________    In your relationship with the person you listed above, how are your boundaries in each of the following categories? Add a check in the appropriate column for each boundary category. [place] Category   Porous   Rigid   Healthy   Other    Physical Boundaries              Intellectual Boundaries             Emotional Boundaries              Sexual Boundaries              Material Boundaries              Time Boundaries              Take a moment to imagine what it will be like when you begin to establish healthy boundaries with this person. If your boundaries are too rigid, that might mean opening up. If theyre porous, it might mean setting limits and saying no when you dont want to do something. What are some specific actions you can take to improve your boundaries? How do you think the other person will respond to these changes? How do you think your life will be different once youve established healthy boundaries?

## 2021-10-11 NOTE — PROGRESS NOTES
Behavioral Health Consultation  Candice Dorantes MSW, LCSW  10/11/2021  10:42 AM            Time spent with Patient: 45 minutes  This is patient's first  [facility] appointment. Reason for Consult:    Chief Complaint   Patient presents with    New Patient    Anxiety    Depression     Referring Provider: Radha Fry MD  [address] [address]    Pt provided informed consent for the behavioral health program. Discussed with patient model of service to include the limits of confidentiality (i.e. abuse reporting, suicide intervention, etc.) and short-term intervention focused approach. Advised patient/parent to guard AVS and file at home to protect private information. Advised patient/parent to only hand in excuse if needed and not AVS.  Pt indicated understanding. Feedback given to PCP. S:  Patient reports problems with feeling anxious and depressed. Pt shared she struggles with setting healthy boundaries with her estranged , which is increasing/maintaining her anxiety. Pt reported she has set some boundaries and they have helped decrease her anxiety. Discussed setting healthy boundaries for her self and children versus being nice to her estranged , continuing the co-dependant relationship.      O:  MSE:    Mood    Anxious  Affect    normal affect  Appetite normal  Sleep disturbance No  Fatigue No  Loss of pleasure No  Attention/Concentration    intact  Morbid ideation No  Suicide Assessment    no suicidal ideation      History:    Medications:   Current Outpatient Medications   Medication Sig Dispense Refill    tiZANidine (ZANAFLEX) 4 MG tablet Take 1 tablet by mouth nightly as needed (TMJ pain) 30 tablet 0    QUEtiapine (SEROQUEL) 50 MG tablet Take 1 tablet by mouth nightly 30 tablet 2    butalbital-APAP-caffeine -40 MG CAPS per capsule TAKE ONE CAPSULE TAKE CAPSULE BY MOUTH EVERY 6 HOURS AS NEEDED FOR HEADACHES 40 capsule 2    tranexamic acid (LYSTEDA) 650 MG (cut herself). She says that she called a friend to come and help her clean it up and she didn't report it or say anything to anyone else about it. History of cutting (has not done this in a couple of years, says she has a wonderful  who helps her with this). INPATIENT HOSPITALIZATIONS: denies        REHABILITATION:denies     Social Determinants of Health     Financial Resource Strain:     Difficulty of Paying Living Expenses:    Food Insecurity:     Worried About Running Out of Food in the Last Year:     [address] in the Last Year:    Transportation Needs:     Lack of Transportation (Medical):  Lack of Transportation (Non-Medical):    Physical Activity:     Days of Exercise per Week:     Minutes of Exercise per Session:    Stress:     Feeling of Stress :    Social Connections:     Frequency of Communication with Friends and Family:     Frequency of Social Gatherings with Friends and Family:     Attends Denominational Services:     Active Member of Clubs or Organizations:     Attends Club or Organization Meetings:     Marital Status:    Intimate Partner Violence:     Fear of Current or Ex-Partner:     Emotionally Abused:     Physically Abused:     Sexually Abused:        TOBACCO:   reports that she has been smoking cigarettes. She started smoking about 16 years ago. She has been smoking about 1.00 pack per day. She has never used smokeless tobacco.  ETOH:   reports current alcohol use. Family History:   Family History   Problem Relation Age of Onset    Kidney stones Mother     Hypertension Father     Cancer Maternal Grandmother         cervical    Heart Failure Maternal Grandmother     Diabetes Paternal Grandfather     High Blood Pressure Paternal Grandfather     Diabetes Maternal Aunt     Cancer Maternal Cousin         cervical    Cancer Maternal Cousin         cervical         A:  Patient presents for consult due to problems with anxiety and depression.    Continued consultation is clinically/medically necessary to support in learning new skills and build confidence to deal better with these issues. Patient response to consults, finds new strategies helpful. PHQ Scores 10/11/2021 5/3/2021 2021   PHQ2 Score 1 0 1   PHQ9 Score 8 0 4     Interpretation of Total Score Depression Severity: 1-4 = Minimal depression, 5-9 = Mild depression, 10-14 = Moderate depression, 15-19 = Moderately severe depression, 20-27 = Severe depression    BRETT-7  Feeling nervous, anxious, or on edge: 2-Over half the days  Not able to stop or control worryin-Several days  Worrying too much about different things: 2-Over half the days  Trouble relaxin-Not at all  Being so restless that it's hard to sit still: 0-Not at all  Becoming easily annoyed or irritable: 0-Not at all  Feeling afraid as if something awful might happen: 0-Not at all  BRETT-7 Total Score: 5    BRETT-7 score interpretation:  0-4 Subclinical, 5-9 Mild, 10-14 Moderate, 15-21 Severe    Diagnosis:    1. [facility] ([facility]          Diagnosis Date    Anxiety     Bipolar 2 disorder ([facility] 2021    Headache(784.0)     Headache, paroxysmal hemicrania, episodic     History of posttraumatic stress disorder (PTSD) 2020    Migraine     Tension headache          Plan:  Pt interventions:  Trained in strategies for increasing balanced thinking, Provided education, Discussed self-care (sleep, nutrition, rewarding activities, social support, exercise), Established rapport, [place]-setting to identify pt's primary goals [facility] visit / overall health, Supportive techniques, Emphasized self-care as important for managing overall health and Identified maladaptive thoughts      Pt Behavioral Change Plan:    See patient instructions.

## 2021-10-20 DIAGNOSIS — G44.221 CHRONIC TENSION-TYPE HEADACHE, INTRACTABLE: ICD-10-CM

## 2021-10-20 DIAGNOSIS — N92.0 MENORRHAGIA WITH REGULAR CYCLE: ICD-10-CM

## 2021-10-20 DIAGNOSIS — N94.6 DYSMENORRHEA: ICD-10-CM

## 2021-10-20 DIAGNOSIS — G43.019 INTRACTABLE MIGRAINE WITHOUT AURA AND WITHOUT STATUS MIGRAINOSUS: ICD-10-CM

## 2021-10-20 RX ORDER — BUTALBITAL, ACETAMINOPHEN AND CAFFEINE 300; 40; 50 MG/1; MG/1; MG/1
1 CAPSULE ORAL EVERY 6 HOURS PRN
Qty: 40 CAPSULE | Refills: 2 | Status: SHIPPED | OUTPATIENT
Start: 2021-10-20 | End: 2021-12-28 | Stop reason: SDUPTHER

## 2021-10-20 RX ORDER — TRANEXAMIC ACID 650 1/1
1300 TABLET ORAL 3 TIMES DAILY
Qty: 30 TABLET | Refills: 5 | Status: SHIPPED | OUTPATIENT
Start: 2021-10-20 | End: 2022-05-31 | Stop reason: SDUPTHER

## 2021-10-26 ENCOUNTER — TELEPHONE (OUTPATIENT)
Dept: PSYCHIATRY | Age: 31
End: 2021-10-26

## 2021-10-27 ENCOUNTER — OFFICE VISIT (OUTPATIENT)
Dept: PSYCHIATRY | Age: 31
End: 2021-10-27
Payer: COMMERCIAL

## 2021-10-27 VITALS
HEART RATE: 96 BPM | DIASTOLIC BLOOD PRESSURE: 67 MMHG | TEMPERATURE: 98 F | HEIGHT: 65 IN | OXYGEN SATURATION: 99 % | SYSTOLIC BLOOD PRESSURE: 102 MMHG | BODY MASS INDEX: 23.43 KG/M2 | WEIGHT: 140.6 LBS

## 2021-10-27 DIAGNOSIS — F31.81 BIPOLAR 2 DISORDER (HCC): Primary | ICD-10-CM

## 2021-10-27 DIAGNOSIS — F43.12 CHRONIC POST-TRAUMATIC STRESS DISORDER (PTSD): ICD-10-CM

## 2021-10-27 DIAGNOSIS — F41.0 GENERALIZED ANXIETY DISORDER WITH PANIC ATTACKS: ICD-10-CM

## 2021-10-27 DIAGNOSIS — F41.1 GENERALIZED ANXIETY DISORDER WITH PANIC ATTACKS: ICD-10-CM

## 2021-10-27 DIAGNOSIS — F51.05 INSOMNIA DUE TO OTHER MENTAL DISORDER: ICD-10-CM

## 2021-10-27 DIAGNOSIS — F99 INSOMNIA DUE TO OTHER MENTAL DISORDER: ICD-10-CM

## 2021-10-27 PROCEDURE — 99213 OFFICE O/P EST LOW 20 MIN: CPT | Performed by: PSYCHIATRY & NEUROLOGY

## 2021-10-27 RX ORDER — QUETIAPINE FUMARATE 100 MG/1
100 TABLET, FILM COATED ORAL NIGHTLY
Qty: 30 TABLET | Refills: 1 | Status: SHIPPED | OUTPATIENT
Start: 2021-10-27 | End: 2022-04-15 | Stop reason: SDUPTHER

## 2021-10-27 NOTE — PROGRESS NOTES
10/27/2021 3:31 PM   Progress Note        Murtaza Rolle 1990      Chief Complaint   Patient presents with    Medication Check         Subjective:    79-year-old white female with history of bipolar disorder, anxiety, panic attacks, presents for follow-up. Currently kept on Lamictal, Wellbutrin, Seroquel. Patient reports compliance. Bright affect today. Doing well overall,but sleeping poorly. Poor appetite due to taking Topamax. Working on filing divorce papers. Coping well. Good family support. Recently moved to sister's. Son's birthday coming up this weekend. Current Substance Use: Never had any issues with a stimulant, opioid or benzodiazepine abuse. Drank too much in the past.  Currently avoiding alcohol. Smokes cigarettes.      BP: /67   Pulse 96   Temp 98 °F (36.7 °C)   Ht 5' 5\" (1.651 m)   Wt 140 lb 9.6 oz (63.8 kg)   SpO2 99%   BMI 23.40 kg/m²       Review of Systems - 14 point review:  All negative:     Constitutional: (fevers, chills, night sweats, wt loss/gain, change in appetite, fatigue, somnolence)    HEENT: (ear pain or discharge, hearing loss, ear ringing, sinus pressure, nosebleed, nasal discharge, sore throat, oral sores, tooth pain, bleeding gums, hoarse voice, neck pain)      Cardiovascular: (HTN, chest pain, elevated cholesterol/lipids, palpitations, leg swelling, leg pain with walking)    Respiratory: (cough, wheezing, snoring, SOB with activity (dyspnea), SOB while lying flat (orthopnea), awakening with severe SOB (paroxysmal nocturnal dyspnea))    Gastrointestinal: (NVD, constipation, abdominal pain, bright red stools, black tarry stools, stool incontinence)     Genitourinary:  (pelvic pain, burning or frequency of urination, urinary urgency, blood in urine incomplete bladder emptying, urinary incontinence, STD; MEN: testicular pain or swelling, erectile dysfunction; WOMEN: LMP, heavy menstrual bleeding (menorrhagia), irregular periods, postmenopausal bleeding, menstrual pain (dymenorrhea, vaginal discharge)    Musculoskeletal: (bone pain/fracture, joint pain or swelling, musle pain)    Integumentary: (rashes, acne, non-healing sores, itching, breast lumps, breast pain, nipple discharge, hair loss)    Neurologic: (HA, muscle weakness, paresthesias (numbness, coldness, crawling or prickling), memory loss, seizure, dizziness)    Psychiatric:  (anxiety, sadness, irritability/anger, insomnia, suicidality)    Endocrine: (heat or cold intolerance, excessive thirst (polydipsia), excessive hunger (polyphagia))    Immune/Allergic: (hives, seasonal or environmental allergies, HIV exposure)    Hematologic/Lymphatic: (lymph node enlargement, easy bleeding or bruising)    History obtained via chart review and patient    PCP is Ashley Gomez MD       Current Meds:    Prior to Admission medications    Medication Sig Start Date End Date Taking?  Authorizing Provider   QUEtiapine (SEROQUEL) 100 MG tablet Take 1 tablet by mouth nightly 10/27/21 11/26/21 Yes Natalie Alves MD   tranexamic acid (LYSTEDA) 650 MG TABS tablet Take 2 tablets by mouth 3 times daily 10/20/21   CHRIS Wilson   butalbital-APAP-caffeine -40 MG CAPS per capsule Take 1 capsule by mouth every 6 hours as needed for Headaches or Migraine 10/20/21 1/18/22  Judith Garcia MD   tiZANidine (ZANAFLEX) 4 MG tablet Take 1 tablet by mouth nightly as needed (TMJ pain) 10/6/21   Aviva Palacio MD   AIMOVIG 140 MG/ML SOAJ INJECT 140 MG INTO THE SKIN EVERY 30 DAYS 5/4/21   Judith Garcia MD   topiramate (TOPAMAX) 100 MG tablet TAKE 1 TABLET BY MOUTH TWO TIMES A DAY 1/15/21   Judith Garcia MD   buPROPion (WELLBUTRIN XL) 150 MG extended release tablet TAKE 1 TABLET BY MOUTH ONE TIME A DAY IN THE MORNING 6/1/20   CHRIS Pearl NP   lamoTRIgine (LAMICTAL) 150 MG tablet Take 1 tablet by mouth daily  Patient taking differently: Take 200 mg by mouth daily  6/1/20   CHRIS Pearl NP MSE:  Appearance: Appropriately groomed. Made good eye contact. Behavior: Calm, cooperative. No psychomotor retardation/agitation appreciated. Gait unremarkable. No abnormal movements or tremor. Speech: Normal in tone, volume, and quality. No slurring, dysarthria or pressured speech noted. Mood: \"Good\"   Affect: Mood congruent. Thought Process: Appears linear, logical and goal oriented. Causality appears intact. Thought Content: Denies active suicidal and homicidal ideations. No overt delusions or paranoia appreciated. Perceptions: Denies auditory or visual hallucinations at present time. Not responding to internal stimuli. Concentration: Intact. Orientation: to person, place, date, and situation. Language: Intact. Fund of information: Intact. Memory: Recent and remote appear intact. Impulsivity: Limited. Neurovegitative: Fair appetite and improved sleep. Insight: Fair. Judgment: Fair.       Lab Results   Component Value Date     08/28/2021    K 3.6 08/28/2021     08/28/2021    CO2 28 08/28/2021    BUN 8 08/28/2021    CREATININE 0.5 08/28/2021    GLUCOSE 95 08/28/2021    CALCIUM 9.2 08/28/2021    PROT 7.6 08/28/2021    LABALBU 4.6 08/28/2021    BILITOT <0.2 08/28/2021    ALKPHOS 88 08/28/2021    AST 20 08/28/2021    ALT 10 08/28/2021    LABGLOM >60 08/28/2021    GFRAA >59 08/28/2021     Lab Results   Component Value Date     08/28/2021    K 3.6 08/28/2021     08/28/2021    CO2 28 08/28/2021    BUN 8 08/28/2021    CREATININE 0.5 08/28/2021    GLUCOSE 95 08/28/2021    CALCIUM 9.2 08/28/2021      No results found for: CHOL  No results found for: TRIG  No results found for: HDL  No results found for: LDLCHOLESTEROL, LDLCALC  No results found for: LABVLDL, VLDL  No results found for: CHOLHDLRATIO  No results found for: LABA1C  No results found for: EAG  No results found for: TSHFT4, TSH  No results found for: VITD25    Assessments Administered:      Assessment:   1. B[facility] 2 disorder ([facility]    2. Generalized anxiety disorder with panic attacks    3. Chronic post-traumatic stress disorder (PTSD)    4. Insomnia due to other mental disorder         No evidence of acute suicidality, homicidality or psychosis observed today. Psychiatrically stable. Plan:  1. Increase Seroquel to help with sleep. The risks, benefits, side effects, indications, contraindications, and adverse effects of the medications have been discussed. Yes.  2. The pt has verbalized understanding and has capacity to give informed consent. 3. The Dheeraj Giles report has been reviewed according to [facility] regulations. 4. Supportive therapy offered. Patient will continue seeing her therapist at the clinic. 5. Follow up: Return in about 2 months (around 12/27/2021). 6. The patient has been advised to call with any problems. 7. Controlled substance Treatment Plan: NA  8. The above listed medications have been continued, modifications in meds and other orders/labs as follows:      Orders Placed This Encounter   Medications    QUEtiapine (SEROQUEL) 100 MG tablet     Sig: Take 1 tablet by mouth nightly     Dispense:  30 tablet     Refill:  1      No orders of the defined types were placed in this encounter. 9. Additional comments: Consider checking thyroid function and vitamin B12 and D levels next time. 10.Over 50% of the total visit time of 26  minutes was spent on counseling and/or coordination of care of:                        1. Bipolar 2 disorder ([facility]    2. Generalized anxiety disorder with panic attacks    3. Chronic post-traumatic stress disorder (PTSD)    4.  Insomnia due to other mental disorder        Kate Haney MD

## 2021-11-05 ENCOUNTER — TELEPHONE (OUTPATIENT)
Dept: PSYCHIATRY | Age: 31
End: 2021-11-05

## 2021-11-05 DIAGNOSIS — F41.1 GENERALIZED ANXIETY DISORDER WITH PANIC ATTACKS: Primary | ICD-10-CM

## 2021-11-05 DIAGNOSIS — F41.0 GENERALIZED ANXIETY DISORDER WITH PANIC ATTACKS: Primary | ICD-10-CM

## 2021-11-05 RX ORDER — CLONAZEPAM 0.5 MG/1
0.25 TABLET ORAL 2 TIMES DAILY PRN
Qty: 14 TABLET | Refills: 0 | Status: SHIPPED | OUTPATIENT
Start: 2021-11-05 | End: 2022-03-10

## 2021-11-05 NOTE — TELEPHONE ENCOUNTER
Patient reports being under a lot of stress. Grandmother doing worse. Family members arguing. Patient having panic attacks and not sleeping. Will provide with a short-term supply of Klonopin.

## 2021-11-09 ENCOUNTER — OFFICE VISIT (OUTPATIENT)
Dept: PSYCHOLOGY | Age: 31
End: 2021-11-09
Payer: COMMERCIAL

## 2021-11-09 DIAGNOSIS — F31.81 BIPOLAR 2 DISORDER (HCC): Primary | ICD-10-CM

## 2021-11-09 PROCEDURE — 90834 PSYTX W PT 45 MINUTES: CPT | Performed by: SOCIAL WORKER

## 2021-11-09 ASSESSMENT — PATIENT HEALTH QUESTIONNAIRE - PHQ9
2. FEELING DOWN, DEPRESSED OR HOPELESS: 2
8. MOVING OR SPEAKING SO SLOWLY THAT OTHER PEOPLE COULD HAVE NOTICED. OR THE OPPOSITE, BEING SO FIGETY OR RESTLESS THAT YOU HAVE BEEN MOVING AROUND A LOT MORE THAN USUAL: 0
3. TROUBLE FALLING OR STAYING ASLEEP: 3
SUM OF ALL RESPONSES TO PHQ QUESTIONS 1-9: 15
SUM OF ALL RESPONSES TO PHQ QUESTIONS 1-9: 15
7. TROUBLE CONCENTRATING ON THINGS, SUCH AS READING THE NEWSPAPER OR WATCHING TELEVISION: 1
6. FEELING BAD ABOUT YOURSELF - OR THAT YOU ARE A FAILURE OR HAVE LET YOURSELF OR YOUR FAMILY DOWN: 1
SUM OF ALL RESPONSES TO PHQ9 QUESTIONS 1 & 2: 5
4. FEELING TIRED OR HAVING LITTLE ENERGY: 3
5. POOR APPETITE OR OVEREATING: 2
SUM OF ALL RESPONSES TO PHQ QUESTIONS 1-9: 15
9. THOUGHTS THAT YOU WOULD BE BETTER OFF DEAD, OR OF HURTING YOURSELF: 0
1. LITTLE INTEREST OR PLEASURE IN DOING THINGS: 3
10. IF YOU CHECKED OFF ANY PROBLEMS, HOW DIFFICULT HAVE THESE PROBLEMS MADE IT FOR YOU TO DO YOUR WORK, TAKE CARE OF THINGS AT HOME, OR GET ALONG WITH OTHER PEOPLE: 2

## 2021-11-09 ASSESSMENT — COLUMBIA-SUICIDE SEVERITY RATING SCALE - C-SSRS
2. HAVE YOU ACTUALLY HAD ANY THOUGHTS OF KILLING YOURSELF?: NO
1. WITHIN THE PAST MONTH, HAVE YOU WISHED YOU WERE DEAD OR WISHED YOU COULD GO TO SLEEP AND NOT WAKE UP?: NO
7. DID THIS OCCUR IN THE LAST THREE MONTHS: NO
6. HAVE YOU EVER DONE ANYTHING, STARTED TO DO ANYTHING, OR PREPARED TO DO ANYTHING TO END YOUR LIFE?: YES

## 2021-11-09 ASSESSMENT — ANXIETY QUESTIONNAIRES
5. BEING SO RESTLESS THAT IT IS HARD TO SIT STILL: 0-NOT AT ALL
GAD7 TOTAL SCORE: 11
1. FEELING NERVOUS, ANXIOUS, OR ON EDGE: 2-OVER HALF THE DAYS
4. TROUBLE RELAXING: 2-OVER HALF THE DAYS
6. BECOMING EASILY ANNOYED OR IRRITABLE: 2-OVER HALF THE DAYS
3. WORRYING TOO MUCH ABOUT DIFFERENT THINGS: 3-NEARLY EVERY DAY
7. FEELING AFRAID AS IF SOMETHING AWFUL MIGHT HAPPEN: 1-SEVERAL DAYS
2. NOT BEING ABLE TO STOP OR CONTROL WORRYING: 1-SEVERAL DAYS

## 2021-11-09 NOTE — PROGRESS NOTES
Behavioral Health Consultation  Allayne Day MSW, LCSW  2021  10:44 AM      Time spent with Patient: 45 minutes  This is patient's second  [facility] appointment. Reason for Consult:    Chief Complaint   Patient presents with    Follow-up    Depression    Anxiety     Referring Provider: Zandra Rangel MD  [address] [address]      Feedback given to PCP. S:  Patient reports problems with feeling overwhelmed with stress due to her 86y/o grandmother being sick and the family fighting over what is best for her. Pt reported multiple people in her family had Covid, continued struggles with getting help to finish her house, the kids adjusting to the divorce, and work stressors. Discussed boundaries and divorce resources. Addressed current and underlying issues, explored and released associated emotions, explored new ways to deal and cope with these problems. O:  MSE:    Mood    Anxious  Depressed  Affect    normal affect  Appetite abnormal: low  Sleep disturbance No  Fatigue No  Loss of pleasure No  Attention/Concentration    intact  Morbid ideation No  Suicide Assessment    no suicidal ideation        A:  Patient presents for consult due to problems with depression, anxiety, and stressed.     PHQ Scores 2021 10/11/2021 5/3/2021 2021   PHQ2 Score 5 1 0 1   PHQ9 Score 15 8 0 4     Interpretation of Total Score Depression Severity: 1-4 = Minimal depression, 5-9 = Mild depression, 10-14 = Moderate depression, 15-19 = Moderately severe depression, 20-27 = Severe depression    BRETT-7  Feeling nervous, anxious, or on edge: 2-Over half the days  Not able to stop or control worryin-Several days  Worrying too much about different things: 3-Nearly every day  Trouble relaxin-Over half the days  Being so restless that it's hard to sit still: 0-Not at all  Becoming easily annoyed or irritable: 2-Over half the days  Feeling afraid as if something awful might happen: 1-Several days  BRETT-7 Total Score: 11    BRETT-7 score interpretation:  0-4 Subclinical, 5-9 Mild, 10-14 Moderate, 15-21 Severe    Continued consultation is clinically/medically necessary to support in learning new skills and build confidence to deal better with these issues. Patient response to consults, finds new strategies helpful. Diagnosis:    1.  [facility] ([facility]          Diagnosis Date    Anxiety     Bipolar 2 disorder ([facility] 01/25/2021    Headache(784.0)     Headache, paroxysmal hemicrania, episodic     History of posttraumatic stress disorder (PTSD) 12/01/2020    Migraine     Tension headache          Plan:  Pt interventions:  Trained in strategies for increasing balanced thinking, Provided education, Discussed self-care (sleep, nutrition, rewarding activities, social support, exercise), Established rapport, [place]-setting to identify pt's primary goals for [facility] visit / overall health, Supportive techniques, Emphasized self-care as important for managing overall health and Identified maladaptive thoughts      Pt Behavioral Change Plan:  See Pt Instructions

## 2021-11-09 NOTE — PATIENT INSTRUCTIONS
What are Personal Boundaries? © 2016 [employer] [facility] 1 Provided by [employer]      Personal boundaries are the limits and rules we set for ourselves within relationships. A person with healthy boundaries can say no to others when they want to, but they are also comfortable opening themselves up to intimacy and close relationships. A person who always keeps others at a distance (whether emotionally, physically, or  otherwise) is said to have rigid boundaries. Alternatively, someone who tends to get too  involved with others has porous boundaries. Common traits of rigid, porous, and healthy boundaries    Rigid Boundaries    Avoids intimacy and close relationships. Unlikely to ask for help. Has few close relationships. Very protective of personal information. May seem detached, even with romantic partners. Keeps others at a distance to avoid the possibility of Rejection. Porous Boundaries  Overshares personal information. Difficulty saying no to the requests of others. Overinvolved with others problems. Dependent on the opinions of others. Accepting of abuse or disrespect. Fears rejection if they do not comply with others. Healthy Boundaries  Values own opinions. Doesnt compromise values for others. Shares personal information in an appropriate way (does not  over or under share). Knows personal wants and needs, and can communicate them. Accepting when others say no to them. Most people have a mix of different boundary types. For example,  someone could have healthy boundaries at work, porous boundaries in  romantic relationships, and a mix of all three types with their family. One  size does not fit all! The appropriateness of boundaries depends heavily on setting. Whats  appropriate to say when youre out with friends might not be appropriate  when youre at work. Some cultures have very different expectations when it comes to  boundaries.  For example, in some pm  Jun 9, 2021 - Sep 22, 2021    My Life Turned Upside Down, But I Turned it Rightside Up by [employer]    [employer]inosaurs Divorce:  A Guide for Changing Families By Cathi Pearl and Terry Sanchez    My Mom and Dad Don't Live Together Anymore: A Drawing Book for Children of  or  Parents by Katja Cote    Helping Your Kids [address] 71 with Divorce the On license of [facility] by Maikel Hampton

## 2021-11-12 ENCOUNTER — OFFICE VISIT (OUTPATIENT)
Dept: FAMILY MEDICINE CLINIC | Age: 31
End: 2021-11-12
Payer: COMMERCIAL

## 2021-11-12 ENCOUNTER — HOSPITAL ENCOUNTER (OUTPATIENT)
Dept: GENERAL RADIOLOGY | Age: 31
Discharge: HOME | End: 2021-11-12
Payer: COMMERCIAL

## 2021-11-12 VITALS
HEART RATE: 94 BPM | HEIGHT: 65 IN | DIASTOLIC BLOOD PRESSURE: 68 MMHG | BODY MASS INDEX: 24.16 KG/M2 | TEMPERATURE: 98.2 F | OXYGEN SATURATION: 98 % | SYSTOLIC BLOOD PRESSURE: 118 MMHG | WEIGHT: 145 LBS

## 2021-11-12 DIAGNOSIS — M54.50 ACUTE LOW BACK PAIN WITHOUT SCIATICA, UNSPECIFIED BACK PAIN LATERALITY: Primary | ICD-10-CM

## 2021-11-12 DIAGNOSIS — Z23 IMMUNIZATION DUE: ICD-10-CM

## 2021-11-12 DIAGNOSIS — M26.643 ARTHRITIS OF BOTH TEMPOROMANDIBULAR JOINTS: ICD-10-CM

## 2021-11-12 DIAGNOSIS — M54.50 ACUTE LOW BACK PAIN WITHOUT SCIATICA, UNSPECIFIED BACK PAIN LATERALITY: ICD-10-CM

## 2021-11-12 PROCEDURE — 99214 OFFICE O/P EST MOD 30 MIN: CPT | Performed by: FAMILY MEDICINE

## 2021-11-12 PROCEDURE — 72100 X-RAY EXAM L-S SPINE 2/3 VWS: CPT

## 2021-11-12 PROCEDURE — 72072 X-RAY EXAM THORAC SPINE 3VWS: CPT

## 2021-11-12 PROCEDURE — 90674 CCIIV4 VAC NO PRSV 0.5 ML IM: CPT | Performed by: FAMILY MEDICINE

## 2021-11-12 PROCEDURE — 90471 IMMUNIZATION ADMIN: CPT | Performed by: FAMILY MEDICINE

## 2021-11-12 ASSESSMENT — ENCOUNTER SYMPTOMS
EYE ITCHING: 0
VOMITING: 0
FACIAL SWELLING: 0
BACK PAIN: 1
STRIDOR: 0
APNEA: 0
EYE DISCHARGE: 0
NAUSEA: 0
COLOR CHANGE: 0
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
## 2021-12-07 ENCOUNTER — OFFICE VISIT (OUTPATIENT)
Dept: PSYCHOLOGY | Age: 31
End: 2021-12-07
Payer: COMMERCIAL

## 2021-12-07 DIAGNOSIS — F31.81 BIPOLAR 2 DISORDER (HCC): Primary | ICD-10-CM

## 2021-12-07 PROCEDURE — 90834 PSYTX W PT 45 MINUTES: CPT | Performed by: SOCIAL WORKER

## 2021-12-07 ASSESSMENT — ANXIETY QUESTIONNAIRES
2. NOT BEING ABLE TO STOP OR CONTROL WORRYING: 0-NOT AT ALL
6. BECOMING EASILY ANNOYED OR IRRITABLE: 0-NOT AT ALL
3. WORRYING TOO MUCH ABOUT DIFFERENT THINGS: 0-NOT AT ALL
5. BEING SO RESTLESS THAT IT IS HARD TO SIT STILL: 1-SEVERAL DAYS
GAD7 TOTAL SCORE: 2
1. FEELING NERVOUS, ANXIOUS, OR ON EDGE: 1-SEVERAL DAYS
4. TROUBLE RELAXING: 0-NOT AT ALL
7. FEELING AFRAID AS IF SOMETHING AWFUL MIGHT HAPPEN: 0-NOT AT ALL

## 2021-12-07 ASSESSMENT — PATIENT HEALTH QUESTIONNAIRE - PHQ9
3. TROUBLE FALLING OR STAYING ASLEEP: 2
SUM OF ALL RESPONSES TO PHQ QUESTIONS 1-9: 4
SUM OF ALL RESPONSES TO PHQ9 QUESTIONS 1 & 2: 1
4. FEELING TIRED OR HAVING LITTLE ENERGY: 0
8. MOVING OR SPEAKING SO SLOWLY THAT OTHER PEOPLE COULD HAVE NOTICED. OR THE OPPOSITE, BEING SO FIGETY OR RESTLESS THAT YOU HAVE BEEN MOVING AROUND A LOT MORE THAN USUAL: 1
SUM OF ALL RESPONSES TO PHQ QUESTIONS 1-9: 4
9. THOUGHTS THAT YOU WOULD BE BETTER OFF DEAD, OR OF HURTING YOURSELF: 0
2. FEELING DOWN, DEPRESSED OR HOPELESS: 1
10. IF YOU CHECKED OFF ANY PROBLEMS, HOW DIFFICULT HAVE THESE PROBLEMS MADE IT FOR YOU TO DO YOUR WORK, TAKE CARE OF THINGS AT HOME, OR GET ALONG WITH OTHER PEOPLE: 0
7. TROUBLE CONCENTRATING ON THINGS, SUCH AS READING THE NEWSPAPER OR WATCHING TELEVISION: 0
6. FEELING BAD ABOUT YOURSELF - OR THAT YOU ARE A FAILURE OR HAVE LET YOURSELF OR YOUR FAMILY DOWN: 0
1. LITTLE INTEREST OR PLEASURE IN DOING THINGS: 0
5. POOR APPETITE OR OVEREATING: 0
SUM OF ALL RESPONSES TO PHQ QUESTIONS 1-9: 4

## 2021-12-07 NOTE — PATIENT INSTRUCTIONS
4 weeks when taken regularly. Despite advertisements to the contrary, over-the-counter sleeping aids have little impact on sleep beyond the placebo effect. Over time, sleeping pills actually can make sleep problems worse. When sleeping pills have been used for a long period, withdrawal from the medication can lead to an insomnia rebound. Thus, after long-term use, many individuals incorrectly conclude that they need sleeping pills in order to sleep normally. Keep use of sleep pills infrequent, but dont worry if you need t use one on an occasional basis. Regular Exercise       Get regular exercise, preferably 40 minutes each day of an activity that causes sweating. .  Exercise in the late afternoon or early evening seems to aid sleep, although the positive effect often takes several weeks to become noticeable. Exercising sporadically is not likely to improve sleep, and exercise within 2 hours of bedtime may elevate nervous system activity and interfere with sleep onset. Hot Baths  Spending 20 minutes in a tub of hot water an hour or two prior to bedtime may promote sleep and is strongly recommended. Bedroom Environment: Moderate Temperature, Quiet, and Dark       Extremes of heat or cold can disrupt sleep. A quiet environment is more sleep promoting than a noisy one. Noises can be masked with background white noise (such as the noise of a fan) or with earplugs. Bedrooms may be darkened with black-out shades or sleep masks can be worn. Position clocks out-of-sight since clock-watching can increase worry about the effects of lack of sleep. Be sure your mattress is not too soft or too firm and that your pillow is the right height and firmness. Eating       A light bedtime snack, such a glass of warm milk, cheese, or a bowl of cereal can promote sleep.   You should avoid the following foods at bedtime:  any caffeinated foods (e.g., chocolate), peanuts, beans, most raw fruits and vegetables (since they may cause gas), and high-fat foods such as potato chips or corn chips. Avoid snacks in the middle of the nights since awakening may become associated with hunger. If you have trouble with regurgitation, be especially careful to avid heavy meals and spices in the evening. Do not go to bed too hungry or too full. It may help to elevate you head with some pillows. Avoid Naps       Avoid naps, the sleep you obtain during the day takes away from you sleep need that night resulting in lighter, more restless sleep, difficulty falling asleep or early morning awakening. If you must nap, keep it brief, and take the nap about 8 hours after arising. It is best to set an alarm to ensure you dont sleep more than 10-15 minutes. Limit Your Time in Bed        Restrict your sleep period to the average number of hours you have actually slept per night during the preceding week. Quality of sleep is important. Too much time in bed can decrease the quality on subsequent night and contribute to the maintenance of existing sleep problems. Dont lay in bed for extended times not sleep. If you arent asleep in about 15-20 minutes go ahead and get up. Do something outside the bedroom that is relaxing. When you feel sleepy (i.e., yawning, head bobbing, eyes closing, concentration decreasing, then return to bed. Dont confuse tiredness with sleepiness, they are different. Tiredness doesnt lead to sleep, only sleepiness does. Regular Sleep Schedule       Keep a regular time each day, 7 days a week, to get out of bed. Keeping a regular awaking time helps set your circadian rhythm set so that your body learns to sleep at the desired time. Use the attached form to develop a plan for improving you sleep hygiene. It will take time for you sleep to get back in line so once you begin your sleep hygiene plan, stick with if for at least 6-8 weeks.       Planned Improvements of My Sleep Hygiene    Check Those  That Apply  _____ Avoid Caffeine 6-8 Hours Before Bedtime. I will not have caffeine after ________ PM.    ____ Avoid Nicotine Before Bedtime. I will not have a cigarette after _________ PM.    ______  Limit Alcohol Use. I will not have more than _______ drinks in the evening.    ______ Avoid Use of Sleeping Pills. (If you are currently using them regularly, all changes should be   medical supervised by your medical provider). ______ Do Exercise Regularly, But Not Within 2 Hours of Bedtime. I ________________ for ____   minutes, on the following days ____________________________________________________    ______ Ensure your Bedroom is a Comfortable Temperature, Quiet, and Dark and Your   Mattress and Pillow are good. I will make the  following changes to my bedroom   ____________________________________________________________________________    ______ Do Take a Hot Bath 1-2 Hours Prior to Bedtime. I will take a hot bath about ______ PM.    ______ Eat a Light Snack at Bedtime but Avoid Large or Problematic Foods. I will eat     __________________  or _____________________ or __________________ before bed.    ______ Avoid Naps. I try not to nap, if I must, I will limit it to _______ minutes, about 8 hours after I   awoke and will use alarm to limit my nap time. ______ Limit Time In Bed. I have been sleeping on average ______ hours per night, therefore I will   limit my time in bed to _____ hours (the same number). If Im not asleep in about 15 to 20   minutes I will get up and not return to bed until Im sleepy.    ______ Stay on a Regular Sleep Schedule  I will get up at _______ AM, 7 days a week, no matter   how poorly I slept that night.

## 2021-12-07 NOTE — PROGRESS NOTES
Behavioral Health Consultation  Mackenzie Shayy MSW, LCSW  2021  9:45 AM      Time spent with Patient: 45 minutes  This is patient's third  [address]  appointment. Reason for Consult:    Chief Complaint   Patient presents with    Follow-up    Depression    Anxiety     Referring Provider: No referring provider defined for this encounter. Feedback given to PCP. S:  Patient reports problems with feeling better. Minimal anxiety and depression, just 1-2 days out of the last 2 weeks. Pt shared updates/fustrations with her  (going through the divorce process), family members, and work. Discussed sleep hygiene. Pt shared about plans to go to [place] at the end of 2022 Summer and the potential places she will go and who she will go with or alone. Addressed current and underlying issues, explored and released associated emotions, explored new ways to deal and cope with these problems. O:  MSE:    Mood    Anxious  Depressed  Affect    normal affect  Appetite normal  Sleep disturbance Yes  Fatigue No  Loss of pleasure No  Attention/Concentration    intact  Morbid ideation No  Suicide Assessment    no suicidal ideation        A:  Patient presents for consult due to problems with anxiety and depression.     PHQ Scores 2021 2021 10/11/2021 5/3/2021 2021   PHQ2 Score 1 5 1 0 1   PHQ9 Score 4 15 8 0 4     Interpretation of Total Score Depression Severity: 1-4 = Minimal depression, 5-9 = Mild depression, 10-14 = Moderate depression, 15-19 = Moderately severe depression, 20-27 = Severe depression    BRETT-7  Feeling nervous, anxious, or on edge: 1-Several days  Not able to stop or control worryin-Not at all  Worrying too much about different things: 0-Not at all  Trouble relaxin-Not at all  Being so restless that it's hard to sit still: 1-Several days  Becoming easily annoyed or irritable: 0-Not at all  Feeling afraid as if something awful might happen: 0-Not at all  BRETT-7 Total Score: 2    BRETT-7 score interpretation:  0-4 Subclinical, 5-9 Mild, 10-14 Moderate, 15-21 Severe    Continued consultation is clinically/medically necessary to support in learning new skills and build confidence to deal better with these issues. Patient response to consults, finds new strategies helpful. Diagnosis:    1.  Bipolar 2 disorder ([facility]          Diagnosis Date    Anxiety     Bipolar 2 disorder ([facility] 01/25/2021    Headache(784.0)     Headache, paroxysmal hemicrania, episodic     History of posttraumatic stress disorder (PTSD) 12/01/2020    Migraine     Tension headache          Plan:  Pt interventions:  Trained in strategies for increasing balanced thinking, Provided education, Discussed self-care (sleep, nutrition, rewarding activities, social support, exercise), Established rapport, [place]-setting to identify pt's primary goals for [facility] visit / overall health, Supportive techniques, Emphasized self-care as important for managing overall health and Identified maladaptive thoughts      Pt Behavioral Change Plan:  See Pt Instructions

## 2021-12-09 RX ORDER — CLONAZEPAM 0.5 MG/1
0.25 TABLET ORAL 2 TIMES DAILY PRN
Qty: 1 TABLET | Refills: 0 | Status: SHIPPED | OUTPATIENT
Start: 2021-12-09 | End: 2022-06-04

## 2021-12-14 ENCOUNTER — TELEPHONE (OUTPATIENT)
Dept: PSYCHIATRY | Age: 31
End: 2021-12-14

## 2021-12-14 NOTE — TELEPHONE ENCOUNTER
Called pt for appointment reminder.     -Pt confirmed      Electronically signed by Delia Romero on 12/14/2021 at 1:45 PM

## 2021-12-15 ENCOUNTER — OFFICE VISIT (OUTPATIENT)
Dept: PSYCHIATRY | Age: 31
End: 2021-12-15
Payer: COMMERCIAL

## 2021-12-15 VITALS
WEIGHT: 142.5 LBS | TEMPERATURE: 97.4 F | DIASTOLIC BLOOD PRESSURE: 59 MMHG | HEIGHT: 65 IN | HEART RATE: 82 BPM | OXYGEN SATURATION: 100 % | SYSTOLIC BLOOD PRESSURE: 89 MMHG | BODY MASS INDEX: 23.74 KG/M2

## 2021-12-15 DIAGNOSIS — F41.1 GENERALIZED ANXIETY DISORDER WITH PANIC ATTACKS: ICD-10-CM

## 2021-12-15 DIAGNOSIS — F51.05 INSOMNIA DUE TO OTHER MENTAL DISORDER: ICD-10-CM

## 2021-12-15 DIAGNOSIS — F99 INSOMNIA DUE TO OTHER MENTAL DISORDER: ICD-10-CM

## 2021-12-15 DIAGNOSIS — Z79.899 HISTORY OF ONGOING TREATMENT WITH HIGH-RISK MEDICATION: Primary | ICD-10-CM

## 2021-12-15 DIAGNOSIS — F41.0 GENERALIZED ANXIETY DISORDER WITH PANIC ATTACKS: ICD-10-CM

## 2021-12-15 DIAGNOSIS — F43.12 CHRONIC POST-TRAUMATIC STRESS DISORDER (PTSD): ICD-10-CM

## 2021-12-15 DIAGNOSIS — F31.81 BIPOLAR II DISORDER, MILD, DEPRESSED, WITH ANXIOUS DISTRESS (HCC): Primary | ICD-10-CM

## 2021-12-15 PROCEDURE — 99214 OFFICE O/P EST MOD 30 MIN: CPT | Performed by: PSYCHIATRY & NEUROLOGY

## 2021-12-15 NOTE — PROGRESS NOTES
12/15/2021 4:30 PM   Progress Note        Gaurav Murphy 1990      Chief Complaint   Patient presents with    Medication Check         Subjective:    42-year-old white female with history of bipolar disorder, anxiety, panic attacks, presents for follow-up. Currently kept on Lamictal, Wellbutrin, Seroquel. On PRN Klonopin. Patient reports compliance. Bright affect today. Mood stable. Sleeping ok. Poor appetite due to taking Topamax. Continues to set limits with ex and her mother. Doing well at work. Getting ready for the holidays. No complaints. Current Substance Use: Never had any issues with a stimulant, opioid or benzodiazepine abuse. Drank too much in the past.  Currently avoiding alcohol. Smokes cigarettes.      BP: BP (!) 89/59   Pulse 82   Temp 97.4 °F (36.3 °C)   Ht 5' 5\" (1.651 m)   Wt 142 lb 8 oz (64.6 kg)   SpO2 100%   BMI 23.71 kg/m²       Review of Systems - 14 point review:  All negative:     Constitutional: (fevers, chills, night sweats, wt loss/gain, change in appetite, fatigue, somnolence)    HEENT: (ear pain or discharge, hearing loss, ear ringing, sinus pressure, nosebleed, nasal discharge, sore throat, oral sores, tooth pain, bleeding gums, hoarse voice, neck pain)      Cardiovascular: (HTN, chest pain, elevated cholesterol/lipids, palpitations, leg swelling, leg pain with walking)    Respiratory: (cough, wheezing, snoring, SOB with activity (dyspnea), SOB while lying flat (orthopnea), awakening with severe SOB (paroxysmal nocturnal dyspnea))    Gastrointestinal: (NVD, constipation, abdominal pain, bright red stools, black tarry stools, stool incontinence)     Genitourinary:  (pelvic pain, burning or frequency of urination, urinary urgency, blood in urine incomplete bladder emptying, urinary incontinence, STD; MEN: testicular pain or swelling, erectile dysfunction; WOMEN: LMP, heavy menstrual bleeding (menorrhagia), irregular periods, postmenopausal bleeding, menstrual pain (dymenorrhea, vaginal discharge)    Musculoskeletal: (bone pain/fracture, joint pain or swelling, musle pain)    Integumentary: (rashes, acne, non-healing sores, itching, breast lumps, breast pain, nipple discharge, hair loss)    Neurologic: (HA, muscle weakness, paresthesias (numbness, coldness, crawling or prickling), memory loss, seizure, dizziness)    Psychiatric:  (anxiety, sadness, irritability/anger, insomnia, suicidality)    Endocrine: (heat or cold intolerance, excessive thirst (polydipsia), excessive hunger (polyphagia))    Immune/Allergic: (hives, seasonal or environmental allergies, HIV exposure)    Hematologic/Lymphatic: (lymph node enlargement, easy bleeding or bruising)    History obtained via chart review and patient    PCP is Dheeraj Staley MD       Current Meds:    Prior to Admission medications    Medication Sig Start Date End Date Taking? Authorizing Provider   clonazePAM (KLONOPIN) 0.5 MG tablet Take 0.5 tablets by mouth 2 times daily as needed for Anxiety for up to 2 doses. 12/9/21 12/10/21  Kate Haney MD   clonazePAM (KLONOPIN) 0.5 MG tablet Take 0.5 tablets by mouth 2 times daily as needed for Anxiety for up to 14 days.  11/5/21 11/19/21  Kate Haney MD   QUEtiapine (SEROQUEL) 100 MG tablet Take 1 tablet by mouth nightly 10/27/21 11/26/21  Kate Haney MD   tranexamic acid (LYSTEDA) 650 MG TABS tablet Take 2 tablets by mouth 3 times daily 10/20/21   CHRIS Welsh-APAP-caffeine -40 MG CAPS per capsule Take 1 capsule by mouth every 6 hours as needed for Headaches or Migraine 10/20/21 1/18/22  Ronda Alva MD   tiZANidine (ZANAFLEX) 4 MG tablet Take 1 tablet by mouth nightly as needed (TMJ pain) 10/6/21   Silvia Velasquez MD   AIMOVIG 140 MG/ML SOAJ INJECT 140 MG INTO THE SKIN EVERY 30 DAYS 5/4/21   Ronda Alva MD   topiramate (TOPAMAX) 100 MG tablet TAKE 1 TABLET BY MOUTH TWO TIMES A DAY 1/15/21   Ronda Alva MD   buPROPion (WELLBUTRIN XL) 150 MG extended release tablet TAKE 1 TABLET BY MOUTH ONE TIME A DAY IN THE MORNING 6/1/20   CHRIS Dunaway NP   lamoTRIgine (LAMICTAL) 150 MG tablet Take 1 tablet by mouth daily  Patient taking differently: Take 200 mg by mouth daily  6/1/20   CHRIS Dunaway NP       MSE:  Appearance: Appropriately groomed. Made good eye contact. Behavior: Calm, cooperative. No psychomotor retardation/agitation appreciated. Gait unremarkable. No abnormal movements or tremor. Speech: Normal in tone, volume, and quality. No slurring, dysarthria or pressured speech noted. Mood: \"Ok\"   Affect: Mood congruent. Thought Process: Appears linear, logical and goal oriented. Causality appears intact. Thought Content: Denies active suicidal and homicidal ideations. No overt delusions or paranoia appreciated. Perceptions: Denies auditory or visual hallucinations at present time. Not responding to internal stimuli. Concentration: Intact. Orientation: to person, place, date, and situation. Language: Intact. Fund of information: Intact. Memory: Recent and remote appear intact. Impulsivity: Limited. Neurovegitative: Fair appetite and sleep. Insight: Fair. Judgment: Fair.       Lab Results   Component Value Date     08/28/2021    K 3.6 08/28/2021     08/28/2021    CO2 28 08/28/2021    BUN 8 08/28/2021    CREATININE 0.5 08/28/2021    GLUCOSE 95 08/28/2021    CALCIUM 9.2 08/28/2021    PROT 7.6 08/28/2021    LABALBU 4.6 08/28/2021    BILITOT <0.2 08/28/2021    ALKPHOS 88 08/28/2021    AST 20 08/28/2021    ALT 10 08/28/2021    LABGLOM >60 08/28/2021    GFRAA >59 08/28/2021     Lab Results   Component Value Date     08/28/2021    K 3.6 08/28/2021     08/28/2021    CO2 28 08/28/2021    BUN 8 08/28/2021    CREATININE 0.5 08/28/2021    GLUCOSE 95 08/28/2021    CALCIUM 9.2 08/28/2021      No results found for: CHOL  No results found for: TRIG  No results found for: HDL  No results found for: Jake Paniagua  No results found for: LABVLDL, VLDL  No results found for: CHOLHDLRATIO  No results found for: LABA1C  No results found for: EAG  No results found for: TSHFT4, TSH  No results found for: VITD25    Assessments Administered:      Assessment:   1. Bipolar II disorder, mild, depressed, with anxious distress ([facility] [address]    2. Generalized anxiety disorder with panic attacks    3. Chronic post-traumatic stress disorder (PTSD)    4. Insomnia due to other mental disorder         No evidence of acute suicidality, homicidality or psychosis observed today. Psychiatrically stable. Plan:  1. Continue current regimen. The risks, benefits, side effects, indications, contraindications, and adverse effects of the medications have been discussed. Yes.  2. The pt has verbalized understanding and has capacity to give informed consent. 3. The [facility] [place] report has been reviewed according to [facility] regulations. 4. Supportive therapy offered. Patient will continue seeing her therapist at the clinic. 5. Follow up: Return in about 2 months (around 2/15/2022). 6. The patient has been advised to call with any problems. 7. Controlled substance Treatment Plan: NA  8. The above listed medications have been continued, modifications in meds and other orders/labs as follows: No orders of the defined types were placed in this encounter. No orders of the defined types were placed in this encounter. 9. Additional comments: Consider checking thyroid function and vitamin B12 and D levels next time. 10.Over 50% of the total visit time of 30  minutes was spent on counseling and/or coordination of care of:                        1. Bipolar II disorder, mild, depressed, with anxious distress ([facility] [address]    2. Generalized anxiety disorder with panic attacks    3. Chronic post-traumatic stress disorder (PTSD)    4.  Insomnia due to other mental disorder        Mary Grace Sarmiento MD

## 2021-12-23 ENCOUNTER — TELEPHONE (OUTPATIENT)
Dept: PSYCHIATRY | Age: 31
End: 2021-12-23

## 2021-12-23 RX ORDER — TOPIRAMATE 100 MG/1
TABLET, FILM COATED ORAL
Qty: 60 TABLET | Refills: 11 | Status: SHIPPED | OUTPATIENT
Start: 2021-12-23 | End: 2021-12-28 | Stop reason: SDUPTHER

## 2021-12-23 NOTE — TELEPHONE ENCOUNTER
Alexandralorena Tucker has requested a refill on her medication.       Last office visit : 11/23/2020   Next office visit : 12/28/2021   Last medication refill :1/15/2021 w/5      Requested Prescriptions     Pending Prescriptions Disp Refills    topiramate (TOPAMAX) 100 MG tablet [Pharmacy Med Name: TOPIRAMATE 100MG TABS] 60 tablet 5     Sig: TAKE 1 TABLET BY MOUTH TWO TIMES A DAY

## 2021-12-28 ENCOUNTER — TELEPHONE (OUTPATIENT)
Dept: NEUROLOGY | Age: 31
End: 2021-12-28

## 2021-12-28 ENCOUNTER — OFFICE VISIT (OUTPATIENT)
Dept: NEUROLOGY | Age: 31
End: 2021-12-28
Payer: COMMERCIAL

## 2021-12-28 VITALS
OXYGEN SATURATION: 97 % | SYSTOLIC BLOOD PRESSURE: 102 MMHG | DIASTOLIC BLOOD PRESSURE: 68 MMHG | WEIGHT: 144 LBS | HEART RATE: 98 BPM | BODY MASS INDEX: 23.99 KG/M2 | HEIGHT: 65 IN

## 2021-12-28 DIAGNOSIS — H81.312 VERTIGO, AURAL, LEFT: ICD-10-CM

## 2021-12-28 DIAGNOSIS — G44.221 CHRONIC TENSION-TYPE HEADACHE, INTRACTABLE: ICD-10-CM

## 2021-12-28 DIAGNOSIS — G43.019 INTRACTABLE MIGRAINE WITHOUT AURA AND WITHOUT STATUS MIGRAINOSUS: Primary | ICD-10-CM

## 2021-12-28 PROCEDURE — 99214 OFFICE O/P EST MOD 30 MIN: CPT | Performed by: PSYCHIATRY & NEUROLOGY

## 2021-12-28 RX ORDER — BUTALBITAL, ACETAMINOPHEN AND CAFFEINE 300; 40; 50 MG/1; MG/1; MG/1
1 CAPSULE ORAL EVERY 6 HOURS PRN
Qty: 40 CAPSULE | Refills: 1 | Status: SHIPPED | OUTPATIENT
Start: 2021-12-28 | End: 2022-03-18 | Stop reason: SDUPTHER

## 2021-12-28 RX ORDER — TOPIRAMATE 100 MG/1
TABLET, FILM COATED ORAL
Qty: 180 TABLET | Refills: 3 | Status: SHIPPED | OUTPATIENT
Start: 2021-12-28 | End: 2022-04-24 | Stop reason: SDUPTHER

## 2021-12-28 NOTE — TELEPHONE ENCOUNTER
Called and left patient a VM to see if she would like to move her appointment time up today to 230 with Dr. Evelyn Pradhan. Left a VM for patient to call our office back.

## 2021-12-28 NOTE — PROGRESS NOTES
[facility]  [address] [place] [address] [address], [address]  [place]  Phone [phone]  Fax [phone]     [facility] Follow Up Encounter  21 2:06 PM CST    Information:   Patient Name: Miguel Beltre  :   1990  Age:   27 y.o. MRN:   [id number]  Account #:  [de-identified]  Today:  21    Provider: Eulalia Tian M.D. Chief Complaint:   Chief Complaint   Patient presents with    Follow-up     6 month f/u        Subjective:   Miguel Beltre is a 27 y.o. woman with a history of chronic tension headaches and intractable migraine headaches who is following up. She is taking Topamax and that seems to help her migraines. She has been on Aimovig for almost a year and that has helped well. When she has a HA she takes Fioricet and that helps. She has been having dizzy spells described as vertigo. These are of sudden onset sometimes brought on by sudden movements. She has had some intermittent tinnitus in the left year. Objective:     Past Medical History:  Past Medical History:   Diagnosis Date    Anxiety     Bipolar 2 disorder ([facility] [address] 2021    Headache(784.0)     Headache, paroxysmal hemicrania, episodic     History of posttraumatic stress disorder (PTSD) 2020    Migraine     Tension headache        Past Surgical History:   Procedure Laterality Date    ADENOIDECTOMY      CHOLECYSTECTOMY, LAPAROSCOPIC  12    TUBAL LIGATION         Recent Hospitalizations  · None    Significant Injuries  · None    Habits  Miguel Beltre reports that she has been smoking cigarettes. She started smoking about 17 years ago. She has been smoking about 1.00 pack per day. She has never used smokeless tobacco. She reports current alcohol use. She reports that she does not use drugs.     Family History   Problem Relation Age of Onset    Kidney stones Mother     Hypertension Father     Cancer Maternal Grandmother         cervical    Heart Failure Maternal Grandmother     Diabetes positive for - headaches and sinus pain  Respiratory: negative for - cough, hemoptysis, and shortness of breath  Cardiovascular: negative for - chest pain and palpitations  Gastrointestinal: negative for - blood in stools, constipation, diarrhea, nausea, and vomiting  Genito-Urinary: negative for - hematuria, urinary frequency, urinary urgency, and urinary retention  Musculoskeletal: negative for - joint pain, joint stiffness, and joint swelling  Hematological and Lymphatic: negative for - bleeding problems, abnormal bruising, and swollen lymph nodes  Endocrine:  negative for - polydipsia and polyphagia  Allergy/Immunology:  negative for - rhinorrhea, sinus congestion, hives  Integument:  negative for - negative for - rash, change in moles, new or changing lesions  Psychological: negative for - anxiety and depression  Neurological: negative for - memory loss, numbness/tingling, and weakness     PHYSICAL EXAMINATION:  Vitals:  /68   Pulse 98   Ht 5' 5\" (1.651 m)   Wt 144 lb (65.3 kg)   SpO2 97%   BMI 23.96 kg/m²   General appearance:  Alert, well developed, well nourished, in no distress  HEENT:  normocephalic, atraumatic, sclera appear normal, no nasal abnormalities, no rhinorrhea, Ears appear normal, oral mucous membranes are moist without erythema, trachea midline, thyroid is normal, no lymphadenopathy or neck mass. Cardiovascular:  Regular rate and rhythm without murmer. No peripheral edema, No cyanosis or clubbing. No carotid bruits. Pulmonary:  Lungs are clear to auscultation. Breathing appears normal, good expansion, normal effort without use of accessory muscles  Musculoskeletal:  Joints are normal.  No splints, slings, or casts. Spine appears normal with normal posture and range of motion. Integument:  No rash, erythema, or pallor  Psychiatric:  Mood, affect, and behavior appear normal      NEUROLOGIC EXAMINATION:  Mental Status:  alert, oriented to person, place, and time.   Speech:  Clear without dysarthria or dysphonia  Language:  Fluent without aphasia  Cranial Nerves:   II Visual fields are full to confrontation   III,IV, VI Extraocular movements are full   VII Facial movements are symmetrical without weakness   VIII Hearing is intact   IX,X Shoulder shrug and head rotation strength are intact   XII No tongue atrophy or fasciculations. Normal tongue protrusion. No tongue weakness  Motor:  Normal strength in both upper and lower extremities. Normal muscle tone and bulk. Deep tendon reflexes are intact and symmetrical in both upper and lower extremities. Martin's signs are absent bilaterally. There is no ankle clonus on either side. Sensation:  Sensation is intact to light touch, temperature, and vibration in all extremities. Coordination:  Rapid alternating movements are normal in both upper and lower extremities. Finger to nose testing is unimpaired bilaterally. Gait:  Normal station and gait. Pertinent Diagnostic Studies:  Narrative   Examination. CT HEAD WO CONTRAST 9/13/2021 10:57 PM   History: Right-sided headache. DLP: 759 mGycm. The CT scan of the head is performed without intravenous contrast   enhancement. The images are acquired in axial plane and subsequent   reconstruction in coronal and sagittal planes. There is no previous study for comparison. There is no evidence of mass. No midline shift. There is no   intracranial hemorrhage or hematoma. The ventricles, the basal cistern   and the cortical sulci are normal. There is normal gray-white matter   differentiation. The images reviewed in bone window show no acute bony abnormality. Visualized paranasal sinuses and mastoid air cells are clear.       Impression   A negative unenhanced CT scan of the head. Above study was initially reviewed and reported by [facility]. I do not   find any discrepancies. Signed by Dr Ned Arthur:       ICD-10-CM    1.  Intractable migraine without aura and without status migrainosus  G43.019 butalbital-APAP-caffeine -40 MG CAPS per capsule   2. Chronic tension-type headache, intractable  G44.221 butalbital-APAP-caffeine -40 MG CAPS per capsule   3. Vertigo, aural, left  H81.312    I discussed the above with her. She is doing fairly well. Plan:   1. Continue Topamax 100 mg BID  2. Continue Aimovig 140 mg SQ monthly. She has had BTL. 3. PRN Fioricet  4. Modified Epley Maneuver.   Instructions given  5. FU in a year    Electronically signed by Jared Bumpers, MD on 12/28/21

## 2021-12-28 NOTE — PATIENT INSTRUCTIONS
Patient Education        Epley Maneuver at Home for Vertigo: Exercises  Introduction  Vertigo is a spinning or whirling sensation when you move your head. Your doctor may have moved you in different positions to help your vertigo get better faster. This is called the Epley maneuver. Your doctor also may have asked you to do these exercises at home. Do the exercises as often as your doctor recommends. If your vertigo is getting worse, your doctor may have you change the exercise or stop it. Step 1  Step 1    1. Sit on the edge of a bed or sofa. Step 2    1. Turn your head 45 degrees in the direction your doctor told you to. This should be toward the ear that causes the most vertigo for you. In this picture, the woman is turning toward her left ear. Step 3    1. Tilt yourself backward until you are lying on your back. Your head should still be at a 45-degree turn. Your head should be about midway between looking straight ahead and looking out to your side. Hold for 30 seconds. If you have vertigo, stay in this position until it stops. Step 4    1. Turn your head 90 degrees toward the ear that has the least vertigo. In this picture, the woman is turning to the right because she has vertigo on her left side. The point of your chin should be raised and over your shoulder. Hold for 30 seconds. Step 5    1. Roll onto the side with the least vertigo. You should now be looking at the floor. Hold for 30 seconds. Follow-up care is a key part of your treatment and safety. Be sure to make and go to all appointments, and call your doctor if you are having problems. It's also a good idea to know your test results and keep a list of the medicines you take. Where can you learn more? Go to [email or web address] org and sign in to your [employer] account. Enter S515 in the [employer] box to learn more about \"Epley Maneuver at Home for Vertigo: Exercises. \"     If you do not have an account, please click on the \"Sign Up Now\" link. Current as of: April 8, 2021               Content Version: 13.1  © 2006-2021 Healthwise, Incorporated. Care instructions adapted under license by [facility] ([facility]). If you have questions about a medical condition or this instruction, always ask your healthcare professional. [place] [address] any warranty or liability for your use of this information.

## 2022-01-05 ENCOUNTER — OFFICE VISIT (OUTPATIENT)
Dept: FAMILY MEDICINE CLINIC | Age: 32
End: 2022-01-05
Payer: COMMERCIAL

## 2022-01-05 VITALS
SYSTOLIC BLOOD PRESSURE: 116 MMHG | DIASTOLIC BLOOD PRESSURE: 68 MMHG | HEART RATE: 100 BPM | OXYGEN SATURATION: 98 % | HEIGHT: 65 IN | BODY MASS INDEX: 24.16 KG/M2 | TEMPERATURE: 97.4 F | WEIGHT: 145 LBS

## 2022-01-05 DIAGNOSIS — M51.37 DDD (DEGENERATIVE DISC DISEASE), LUMBOSACRAL: Primary | ICD-10-CM

## 2022-01-05 DIAGNOSIS — M26.643 ARTHRITIS OF BOTH TEMPOROMANDIBULAR JOINTS: ICD-10-CM

## 2022-01-05 PROCEDURE — 99214 OFFICE O/P EST MOD 30 MIN: CPT | Performed by: FAMILY MEDICINE

## 2022-01-05 RX ORDER — CYCLOBENZAPRINE HCL 5 MG
5 TABLET ORAL NIGHTLY PRN
Qty: 30 TABLET | Refills: 1 | Status: SHIPPED | OUTPATIENT
Start: 2022-01-05 | End: 2022-04-24 | Stop reason: SDUPTHER

## 2022-01-05 ASSESSMENT — ENCOUNTER SYMPTOMS
STRIDOR: 0
COLOR CHANGE: 0
EYE DISCHARGE: 0
EYE ITCHING: 0
NAUSEA: 0
BACK PAIN: 1
APNEA: 0
FACIAL SWELLING: 0
VOMITING: 0
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
## 2022-01-10 ENCOUNTER — HOSPITAL ENCOUNTER (OUTPATIENT)
Dept: PHYSICAL THERAPY | Age: 32
Setting detail: THERAPIES SERIES
Discharge: HOME | End: 2022-01-10
Payer: COMMERCIAL

## 2022-01-10 PROCEDURE — G0283 ELEC STIM OTHER THAN WOUND: HCPCS

## 2022-01-10 PROCEDURE — 97530 THERAPEUTIC ACTIVITIES: CPT

## 2022-01-10 PROCEDURE — 97162 PT EVAL MOD COMPLEX 30 MIN: CPT

## 2022-01-10 ASSESSMENT — PAIN DESCRIPTION - PROGRESSION
CLINICAL_PROGRESSION: GRADUALLY WORSENING
CLINICAL_PROGRESSION: GRADUALLY WORSENING

## 2022-01-10 ASSESSMENT — PAIN SCALES - GENERAL: PAINLEVEL_OUTOF10: 4

## 2022-01-10 ASSESSMENT — PAIN - FUNCTIONAL ASSESSMENT: PAIN_FUNCTIONAL_ASSESSMENT: ACTIVITIES ARE NOT PREVENTED

## 2022-01-10 ASSESSMENT — PAIN DESCRIPTION - DESCRIPTORS: DESCRIPTORS: ACHING

## 2022-01-10 ASSESSMENT — PAIN DESCRIPTION - PAIN TYPE: TYPE: CHRONIC PAIN

## 2022-01-10 ASSESSMENT — PAIN DESCRIPTION - FREQUENCY: FREQUENCY: INTERMITTENT

## 2022-01-10 ASSESSMENT — PAIN DESCRIPTION - LOCATION: LOCATION: BACK

## 2022-01-10 ASSESSMENT — PAIN DESCRIPTION - ORIENTATION: ORIENTATION: POSTERIOR

## 2022-01-11 ASSESSMENT — PAIN DESCRIPTION - PROGRESSION: CLINICAL_PROGRESSION: GRADUALLY WORSENING

## 2022-01-11 NOTE — PROGRESS NOTES
Physical Therapy  Initial Assessment  Date: 2022  Patient Name: Tracy Iyer  MRN: [id number]  : 1990     Treatment Diagnosis: Low back pain    Restrictions       Subjective   General  Chart Reviewed: Yes  Patient assessed for rehabilitation services?: Yes  Additional Pertinent Hx: She relates increased low back pain X 10 years, worse past 2 years. Subjective  Subjective: She relates intermittent lumbar pain. She has more pain sitting. Her condition is getting worse. She denies LE numbness, tingling, weakness. She has not trouble sleeping due to pain. 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 Pain Screening  Patient Currently in Pain: Yes  Pain Assessment  Clinical Progression: Gradually worsening  Vital Signs  Patient Currently in Pain: Yes    Vision/Hearing       Orientation       Social/Functional History  Social/Functional History  Lives With: Family  Type of Home: House  ADL Assistance: Independent  Homemaking Assistance: Independent  Homemaking Responsibilities: Yes  Meal Prep Responsibility: Primary  Laundry Responsibility: Primary  Cleaning Responsibility: Primary  Bill Paying/Finance Responsibility: Primary  Shopping Responsibility: Primary  Health Care Management: Primary  Ambulation Assistance: Independent  Transfer Assistance: Independent  Occupation: Full time employment  Type of occupation: CNA  Additional Comments: Has 2 children; 15, 6. Objective     Observation/Palpation  Palpation: Slight tenderness of lumbar paraspinal mm. SIJ's non-tender. Observation: Right shoulder<left; bilat pes cavus R>L;    AROM RLE (degrees)  RLE AROM: WNL  AROM LLE (degrees)  LLE AROM : WNL  Spine  Lumbar: Forward bend: 10 degrees; BB: 10 degrees; L side bend: 20 degrees; R side bendin degrees. Special Tests: No pain with ant/post SIJ gapping. Slight tenderness with PA pressure to lumbar SP's. Joint Mobility  Spine: Decreased lumbar ROM with PA's.     Strength RLE  R Hip Flexion: 5/5  R Hip Extension: 5/5  R Knee Flexion: 5/5  R Knee Extension: 5/5  R Ankle Dorsiflexion: 5/5  R Ankle Plantar flexion: 5/5  R Ankle Inversion: 5/5  R Great Toe Extension: 5/5  Strength LLE  L Hip Flexion: 5/5  L Hip Extension: 5/5  L Knee Flexion: 5/5  L Knee Extension: 5/5  L Ankle Dorsiflexion: 5/5  L Ankle Plantar Flexion: 5/5  L Great Toe Extension: 5/5  Strength Other  Other: Plank: 14 sec                                                   Assessment   Conditions Requiring Skilled Therapeutic Intervention  Body structures, Functions, Activity limitations: Decreased functional mobility ; Increased pain;Decreased ROM; Decreased strength;Decreased high-level IADLs  Assessment: Ms. Nava Carias presents will LBP which is worse with prolonged sitting. She has decreased sagittal plane ROM of lumbar spine with most limitation in forward bending. Decreased abd mm strength is also noted. She is alert and cooperative and anxious to continue PT program.  She understands PT risk/benefit. Treatment Diagnosis: Low back pain  Prognosis: Excellent  Decision Making: Medium Complexity  REQUIRES PT FOLLOW UP: Yes         Plan   Plan  Times per week: 2  Plan weeks: 6  Current Treatment Recommendations: Strengthening,IADL Training,Neuromuscular Re-education,Home Exercise Program,ROM,Manual Therapy - Soft Tissue Mobilization,Manual Therapy - Joint Manipulation,Pain Management    Goals  Short term goals  Time Frame for Short term goals: 3-4 weeks  Short term goal 1: Patient will increase lumbar forward and backward bending by 5 degrees. Short term goal 2: Patinet will improve BARBARA by 5% points. Short term goal 3: Patient will demonstrate functional improvement of core mm strength with performance of plank on forearms and toes, demonstrating good trunk stability, for 30 sec. Short term goal 4: Patient will demonstrates ability of HEP with min cueing.   Long term goals  Time Frame for Long term goals : 6-8 weeks  Long term goal 1: Patient will increase lumbar forward and backward bending by 10 degrees. Long term goal 2: Patinet will improve BARBARA by 7% points. Long term goal 3: Patient will demonstrate functional improvement of core mm strength with performance of plank on forearms and toes, demonstrating good trunk stability, for 45 sec. Long term goal 4: Patient will demonstrates ability of HEP independently. Patient Goals   Patient goals :  To work without pain     Time code minutes: 40  Therapy Time   Individual Concurrent Group Co-treatment   Time In  1000         Time Out  1100         Minutes  60            Electronically signed by Yennifer Cruz PT on 1/11/2022 at 8:54 AM

## 2022-01-13 ENCOUNTER — HOSPITAL ENCOUNTER (OUTPATIENT)
Dept: PHYSICAL THERAPY | Age: 32
Setting detail: THERAPIES SERIES
Discharge: HOME | End: 2022-01-13
Payer: COMMERCIAL

## 2022-01-13 PROCEDURE — 97110 THERAPEUTIC EXERCISES: CPT

## 2022-01-13 PROCEDURE — G0283 ELEC STIM OTHER THAN WOUND: HCPCS

## 2022-01-13 ASSESSMENT — PAIN DESCRIPTION - PROGRESSION: CLINICAL_PROGRESSION: GRADUALLY WORSENING

## 2022-01-13 ASSESSMENT — PAIN SCALES - GENERAL: PAINLEVEL_OUTOF10: 0

## 2022-01-13 NOTE — PROGRESS NOTES
Physical Therapy  Daily Treatment Note  Date: 2022  Patient Name: Antonella Villasenor  MRN: [id number]     :   1990    Subjective:   General  Chart Reviewed: Yes  Additional Pertinent Hx: She relates increased low back pain X 10 years, worse past 2 years. PT Visit Information  Total # of Visits to Date: 2  Plan of Care/Certification Expiration Date: 22  Progress Note Due Date: 22  Subjective  Subjective: She is not having pain today. Pain Screening  Patient Currently in Pain: Yes  Pain Assessment  Pain Assessment: 0-10  Pain Level: 0  Clinical Progression: Gradually worsening  Vital Signs  Patient Currently in Pain: Yes       Treatment Activities:                                    Exercises  Exercise 1: 6 MWT; 933\" VS as below. Exercise 2: Supine TA series; UE's, LE, Alt; 10 reps  Exercise 3: Supine Single knee chest; DKC, 10 reps  Exercise 4: Supine lower trunk rotations, 10 reps  Exercise 5: Supine bridge, 10 reps  Exercise 6: Prone plank, 5 sec hold with good control, 5 reps. Exercise 7: Prone alt arm/leg raise, 10 reps  Exercise 8: Leg press, 6 plates, 10 reps, 2 sets  Exercise 9: Standing hip abd, extension; 10 reps  Exercise 10: Standing mini squat, 10 reps,  Exercise 11: Standing heel raise  Exercise 12: Paloff press, red t band, 10 reps  Exercise 13: Multifidus rorws, red t band, 10 reps  Exercise 14: MH with e stim  Exercise 15: Sidelying rotational mobs, Grade II-III, 3 min with in pain bri. Exercise 16: 90/90 hamstring stretch, 10 sec hold, X 3 each  Exercise 17: High rows, red t band, 10 reps                               Assessment:   Conditions Requiring Skilled Therapeutic Intervention  Body structures, Functions, Activity limitations: Decreased functional mobility ; Increased pain;Decreased ROM; Decreased strength;Decreased high-level IADLs  Assessment: She rates pain at 2/10 following session today. She did not relate specific pain increase with any of the ther ex.   Treatment Diagnosis: Low back pain  Prognosis: Excellent  Decision Making: Medium Complexity  REQUIRES PT FOLLOW UP: Yes    Goals:  Short term goals  Time Frame for Short term goals: 3-4 weeks  Short term goal 1: Patient will increase lumbar forward and backward bending by 5 degrees. Short term goal 2: Patinet will improve BARBARA by 5% points. Short term goal 3: Patient will demonstrate functional improvement of core mm strength with performance of plank on forearms and toes, demonstrating good trunk stability, for 30 sec. Short term goal 4: Patient will demonstrates ability of HEP with min cueing. Long term goals  Time Frame for Long term goals : 6-8 weeks  Long term goal 1: Patient will increase lumbar forward and backward bending by 10 degrees. Long term goal 2: Patinet will improve BARBARA by 7% points. Long term goal 3: Patient will demonstrate functional improvement of core mm strength with performance of plank on forearms and toes, demonstrating good trunk stability, for 45 sec. Long term goal 4: Patient will demonstrates ability of HEP independently. Patient Goals   Patient goals :  To work without pain  Plan:    Plan  Times per week: 2  Plan weeks: 6  Current Treatment Recommendations: Strengthening,IADL Training,Neuromuscular Re-education,Home Exercise Program,ROM,Manual Therapy - Soft Tissue Mobilization,Manual Therapy - Joint Manipulation,Pain Management  Timed Code Treatment Minutes: 52 Minutes     Therapy Time   Individual Concurrent Group Co-treatment   Time In 1528         Time Out 0695         [id number]         Timed Code Treatment Minutes: 52 Minutes  Electronically signed by Funmilayo Lanier PT on 1/13/2022 at 10:54 AM

## 2022-01-17 ENCOUNTER — HOSPITAL ENCOUNTER (OUTPATIENT)
Dept: PHYSICAL THERAPY | Age: 32
Setting detail: THERAPIES SERIES
Discharge: HOME | End: 2022-01-17
Payer: COMMERCIAL

## 2022-01-17 VITALS — OXYGEN SATURATION: 97 % | HEART RATE: 96 BPM

## 2022-01-17 PROCEDURE — 97110 THERAPEUTIC EXERCISES: CPT

## 2022-01-17 PROCEDURE — G0283 ELEC STIM OTHER THAN WOUND: HCPCS

## 2022-01-17 ASSESSMENT — PAIN SCALES - GENERAL: PAINLEVEL_OUTOF10: 3

## 2022-01-17 ASSESSMENT — PAIN DESCRIPTION - DESCRIPTORS: DESCRIPTORS: CRAMPING;ACHING

## 2022-01-17 ASSESSMENT — PAIN DESCRIPTION - PROGRESSION: CLINICAL_PROGRESSION: GRADUALLY WORSENING

## 2022-01-17 ASSESSMENT — PAIN DESCRIPTION - LOCATION: LOCATION: BACK;ABDOMEN

## 2022-01-19 ENCOUNTER — HOSPITAL ENCOUNTER (OUTPATIENT)
Dept: PHYSICAL THERAPY | Age: 32
Setting detail: THERAPIES SERIES
Discharge: HOME | End: 2022-01-19
Payer: COMMERCIAL

## 2022-01-19 PROCEDURE — G0283 ELEC STIM OTHER THAN WOUND: HCPCS

## 2022-01-19 PROCEDURE — 97110 THERAPEUTIC EXERCISES: CPT

## 2022-01-19 ASSESSMENT — PAIN DESCRIPTION - PROGRESSION: CLINICAL_PROGRESSION: GRADUALLY IMPROVING

## 2022-01-19 NOTE — PROGRESS NOTES
Physical Therapy  Daily Treatment Note  Date: 2022  Patient Name: Danny Rodriguez  MRN: [id number]     :   1990    Subjective:   General  Chart Reviewed: Yes  Additional Pertinent Hx: She relates increased low back pain X 10 years, worse past 2 years. PT Visit Information  Total # of Visits to Date: 4  Plan of Care/Certification Expiration Date: 22  Progress Note Due Date: 22  Subjective  Subjective: Ms Becky Hua reports she is feeling much better with therapy. She states she has learned to do the exercises when she starts to have pain and they help. Pain Screening  Patient Currently in Pain: Denies  Pain Assessment  Clinical Progression: Gradually improving  Vital Signs  Patient Currently in Pain: Denies       Treatment Activities:                                    Exercises  Exercise 1: 6 MWT; 1170' VS as below. Exercise 2: Supine TA series; UE's, LE, Alt; 10 reps  Exercise 3: Supine Single knee chest; DKC, 10 reps  Exercise 4: Supine lower trunk rotations, 10 reps  Exercise 5: Supine bridge, 10 reps  Exercise 6: Prone plank, 10 sec hold with good control, 5 reps. Exercise 7: Prone alt arm/leg raise, 10 reps  Exercise 8: Leg press, 6 plates, 10 reps, 2 sets  Exercise 9: Standing hip abd, extension; 10 reps  Exercise 10: Standing mini squat, 10 reps,  Exercise 11: Standing heel raise x 10  Exercise 12: Paloff press, red t band, 10 reps  Exercise 13: Multifidus rorws, red t band, 10 reps  Exercise 14: MH with e stim seated x 15 minutes  Exercise 15: Sidelying rotational mobs, Grade II-III, 3 min with in pain bri. NOT TODAY  Exercise 16: 90/90 hamstring stretch, 10 sec hold, X 3 each NOT TODAY  Exercise 17: High rows, red t band, 10 reps                               Assessment:   Conditions Requiring Skilled Therapeutic Intervention  Body structures, Functions, Activity limitations: Decreased functional mobility ; Increased pain;Decreased ROM; Decreased strength;Decreased high-level IADLs  Assessment: Ms Sunni Kapoor has made good progress with therapy and reports decrease in intensity and frequency of pain. She did not have increase in pain with therapy today and demonstrated good technique with exercises throughout, with minimal cuing needed. Advanced some exercises where appropriate. Treatment Diagnosis: Low back pain  Prognosis: Excellent  Decision Making: Medium Complexity  REQUIRES PT FOLLOW UP: Yes    Goals:  Short term goals  Time Frame for Short term goals: 3-4 weeks  Short term goal 1: Patient will increase lumbar forward and backward bending by 5 degrees. Short term goal 2: Patinet will improve BARBARA by 5% points. Short term goal 3: Patient will demonstrate functional improvement of core mm strength with performance of plank on forearms and toes, demonstrating good trunk stability, for 30 sec. Short term goal 4: Patient will demonstrates ability of HEP with min cueing. Long term goals  Time Frame for Long term goals : 6-8 weeks  Long term goal 1: Patient will increase lumbar forward and backward bending by 10 degrees. Long term goal 2: Patinet will improve BARBARA by 7% points. Long term goal 3: Patient will demonstrate functional improvement of core mm strength with performance of plank on forearms and toes, demonstrating good trunk stability, for 45 sec. Long term goal 4: Patient will demonstrates ability of HEP independently. Patient Goals   Patient goals :  To work without pain  Plan:    Plan  Times per week: 2  Plan weeks: 6  Current Treatment Recommendations: Strengthening,IADL Training,Neuromuscular Re-education,Home Exercise Program,ROM,Manual Therapy - Soft Tissue Mobilization,Manual Therapy - Joint Manipulation,Pain Management  Timed Code Treatment Minutes: 43 Minutes (15 minutes estim)     Therapy Time   Individual Concurrent Group Co-treatment   Time In 1002         Time Out 1100         Minutes 58         Timed Code Treatment Minutes: 43 Minutes (15 minutes

## 2022-02-01 ENCOUNTER — TELEPHONE (OUTPATIENT)
Dept: PSYCHIATRY | Age: 32
End: 2022-02-01

## 2022-02-01 ENCOUNTER — HOSPITAL ENCOUNTER (OUTPATIENT)
Dept: PHYSICAL THERAPY | Age: 32
Setting detail: THERAPIES SERIES
Discharge: HOME | End: 2022-02-01
Payer: COMMERCIAL

## 2022-02-01 PROCEDURE — 97110 THERAPEUTIC EXERCISES: CPT

## 2022-02-01 NOTE — TELEPHONE ENCOUNTER
Pt reminded of need for UDS as ordered per Dr Louise Bravo. Pt agreeable to get this lab done and knows the location of the lab dept.

## 2022-02-01 NOTE — PROGRESS NOTES
Physical Therapy  Daily Treatment Note  Date: 2022  Patient Name: Kevin Hinojosa  MRN: [id number]     :   1990    Subjective:      PT Visit Information  Total # of Visits Approved: 12  Total # of Visits to Date: 5  Plan of Care/Certification Expiration Date: 22  Progress Note Due Date: 22  Subjective: right now i'm not hurting but yesterday i was due to having to sit with patients  Patient Currently in Pain: Denies      Treatment Activities:     Exercises  Exercise 1: 6 MWT; 1051' VS as below. Exercise 2: Supine TA series; UE's, LE, Alt; 10 reps  Exercise 3: Supine Single knee chest; DKC, 10 reps  Exercise 4: Supine lower trunk rotations, 10 reps  Exercise 5: Supine bridge, 10 reps  Exercise 6: Prone plank, 10 sec hold with good control, 5 reps. Exercise 7: Prone alt arm/leg raise, 10 reps  Exercise 8: Leg press, 6 plates, 10 reps, 2 sets  Exercise 9: Standing hip abd, extension; 10 reps  Exercise 10: Standing mini squat, 10 reps,  Exercise 11: Standing heel raise x 10  Exercise 12: Paloff press, green t band, 10 reps  Exercise 13: Multifidus rorws, green t band, 10 reps  Exercise 14: High rows, green t band, 10 reps  Exercise 15: Sidelying rotational mobs, Grade II-III, 3 min with in pain bri. NOT TODAY  Exercise 16: 90/90 hamstring stretch, 10 sec hold, X 3 each  Exercise 17: MH with e stim seated x 15 minutes--not today  Exercise 18: : HEP ISSUED         Assessment:   Conditions Requiring Skilled Therapeutic Intervention  Body structures, Functions, Activity limitations: Decreased functional mobility ; Increased pain;Decreased ROM; Decreased strength;Decreased high-level IADLs  Assessment: HEP issued with pictures and written instructions and reviewed during therapy session and increased resistance with t-band exercises, able to go thru exercise routine with only an occasional cue for propertechnique and estim not performed due to no pain at end of session per patients request.  Treatment

## 2022-02-02 ENCOUNTER — APPOINTMENT (OUTPATIENT)
Dept: PHYSICAL THERAPY | Age: 32
End: 2022-02-02
Payer: COMMERCIAL

## 2022-02-15 ENCOUNTER — TELEPHONE (OUTPATIENT)
Dept: PSYCHIATRY | Age: 32
End: 2022-02-15

## 2022-02-15 NOTE — TELEPHONE ENCOUNTER
Called pt for appointment reminder.     -Pt confirmed      Electronically signed by Chandra Diaz MA on 2/15/2022 at 1:24 PM

## 2022-02-16 ENCOUNTER — OFFICE VISIT (OUTPATIENT)
Dept: PSYCHIATRY | Age: 32
End: 2022-02-16
Payer: COMMERCIAL

## 2022-02-16 VITALS
OXYGEN SATURATION: 99 % | DIASTOLIC BLOOD PRESSURE: 76 MMHG | HEART RATE: 99 BPM | HEIGHT: 65 IN | BODY MASS INDEX: 23.09 KG/M2 | WEIGHT: 138.6 LBS | TEMPERATURE: 98.5 F | SYSTOLIC BLOOD PRESSURE: 112 MMHG

## 2022-02-16 DIAGNOSIS — F51.05 INSOMNIA DUE TO OTHER MENTAL DISORDER: ICD-10-CM

## 2022-02-16 DIAGNOSIS — F43.12 CHRONIC POST-TRAUMATIC STRESS DISORDER (PTSD): ICD-10-CM

## 2022-02-16 DIAGNOSIS — F41.0 GENERALIZED ANXIETY DISORDER WITH PANIC ATTACKS: ICD-10-CM

## 2022-02-16 DIAGNOSIS — F41.1 GENERALIZED ANXIETY DISORDER WITH PANIC ATTACKS: ICD-10-CM

## 2022-02-16 DIAGNOSIS — F99 INSOMNIA DUE TO OTHER MENTAL DISORDER: ICD-10-CM

## 2022-02-16 DIAGNOSIS — F31.81 BIPOLAR II DISORDER, MILD, DEPRESSED, WITH ANXIOUS DISTRESS (HCC): Primary | ICD-10-CM

## 2022-02-16 PROCEDURE — 99214 OFFICE O/P EST MOD 30 MIN: CPT | Performed by: PSYCHIATRY & NEUROLOGY

## 2022-02-16 NOTE — PROGRESS NOTES
2/16/2022 3:53 PM   Progress Note        Laurel Richards 1990      Chief Complaint   Patient presents with    Medication Check         Subjective:    77-year-old white female with history of bipolar disorder, anxiety, panic attacks, presents for follow-up. Currently kept on Lamictal, Wellbutrin, Seroquel. On PRN Klonopin. Patient reports compliance. No SEs. Bright affect today. Mood stable. Depression 4/10, anxiety 5/10. Sleeping ok. Good appetite. PTSD recently triggered by argument with father. Coping well. Doing well at work. No complaints. Continues to see Doctor Phillips for therapy. Current Substance Use: Never had any issues with a stimulant, opioid or benzodiazepine abuse. Drank too much in the past.  Currently avoiding alcohol. Smokes cigarettes.      BP: /76   Pulse 99   Temp 98.5 °F (36.9 °C)   Ht 5' 5\" (1.651 m)   Wt 138 lb 9.6 oz (62.9 kg)   SpO2 99%   BMI 23.06 kg/m²       Review of Systems - 14 point review:  All negative:     Constitutional: (fevers, chills, night sweats, wt loss/gain, change in appetite, fatigue, somnolence)    HEENT: (ear pain or discharge, hearing loss, ear ringing, sinus pressure, nosebleed, nasal discharge, sore throat, oral sores, tooth pain, bleeding gums, hoarse voice, neck pain)      Cardiovascular: (HTN, chest pain, elevated cholesterol/lipids, palpitations, leg swelling, leg pain with walking)    Respiratory: (cough, wheezing, snoring, SOB with activity (dyspnea), SOB while lying flat (orthopnea), awakening with severe SOB (paroxysmal nocturnal dyspnea))    Gastrointestinal: (NVD, constipation, abdominal pain, bright red stools, black tarry stools, stool incontinence)     Genitourinary:  (pelvic pain, burning or frequency of urination, urinary urgency, blood in urine incomplete bladder emptying, urinary incontinence, STD; MEN: testicular pain or swelling, erectile dysfunction; WOMEN: LMP, heavy menstrual bleeding (menorrhagia), irregular periods, postmenopausal bleeding, menstrual pain (dymenorrhea, vaginal discharge)    Musculoskeletal: (bone pain/fracture, joint pain or swelling, musle pain)    Integumentary: (rashes, acne, non-healing sores, itching, breast lumps, breast pain, nipple discharge, hair loss)    Neurologic: (HA, muscle weakness, paresthesias (numbness, coldness, crawling or prickling), memory loss, seizure, dizziness)    Endocrine: (heat or cold intolerance, excessive thirst (polydipsia), excessive hunger (polyphagia))    Immune/Allergic: (hives, seasonal or environmental allergies, HIV exposure)    Hematologic/Lymphatic: (lymph node enlargement, easy bleeding or bruising)    History obtained via chart review and patient    PCP is Sofia Montoya MD       Current Meds:    Prior to Admission medications    Medication Sig Start Date End Date Taking? Authorizing Provider   cyclobenzaprine (FLEXERIL) 5 MG tablet Take 1 tablet by mouth nightly as needed for Muscle spasms 1/5/22   Jannette Thornton MD   butalbital-APAP-caffeine -40 MG CAPS per capsule Take 1 capsule by mouth every 6 hours as needed for Headaches or Migraine 12/28/21 4/27/22  Rishi Robb MD   topiramate (TOPAMAX) 100 MG tablet TAKE 1 TABLET BY MOUTH TWO TIMES A DAY 12/28/21   Rishi Robb MD   clonazePAM (KLONOPIN) 0.5 MG tablet Take 0.5 tablets by mouth 2 times daily as needed for Anxiety for up to 2 doses. 12/9/21 12/28/21  Kandy Herring MD   clonazePAM (KLONOPIN) 0.5 MG tablet Take 0.5 tablets by mouth 2 times daily as needed for Anxiety for up to 14 days.  11/5/21 12/28/21  Kandy Herring MD   QUEtiapine (SEROQUEL) 100 MG tablet Take 1 tablet by mouth nightly 10/27/21 12/28/21  Kandy Herring MD   tranexamic acid (LYSTEDA) 650 MG TABS tablet Take 2 tablets by mouth 3 times daily 10/20/21   Achilles Sep, APRN   AIMOVIG 140 MG/ML SOAJ INJECT 140 MG INTO THE SKIN EVERY 30 DAYS 5/4/21   Rishi Robb MD   buPROPion (WELLBUTRIN XL) 150 MG extended release tablet TAKE 1 TABLET BY MOUTH ONE TIME A DAY IN THE MORNING 6/1/20   CHRIS Boudreaux - NP   lamoTRIgine (LAMICTAL) 150 MG tablet Take 1 tablet by mouth daily  Patient taking differently: Take 200 mg by mouth daily  6/1/20   CHRIS Boudreaux NP       MSE:  Appearance: Appropriately groomed. Made good eye contact. Behavior: Calm, cooperative. No psychomotor retardation/agitation appreciated. Gait unremarkable. No abnormal movements or tremor. Speech: Normal in tone, volume, and quality. No slurring, dysarthria or pressured speech noted. Mood: \"Overall ok\"   Affect: Mood congruent. Thought Process: Appears linear, logical and goal oriented. Causality appears intact. Thought Content: Denies active suicidal and homicidal ideations. No overt delusions or paranoia appreciated. Perceptions: Denies auditory or visual hallucinations at present time. Not responding to internal stimuli. Concentration: Intact. Orientation: to person, place, date, and situation. Language: Intact. Fund of information: Intact. Memory: Recent and remote appear intact. Impulsivity: Limited. Neurovegitative: Fair appetite and sleep. Insight: Fair. Judgment: Fair.       Lab Results   Component Value Date     08/28/2021    K 3.6 08/28/2021     08/28/2021    CO2 28 08/28/2021    BUN 8 08/28/2021    CREATININE 0.5 08/28/2021    GLUCOSE 95 08/28/2021    CALCIUM 9.2 08/28/2021    PROT 7.6 08/28/2021    LABALBU 4.6 08/28/2021    BILITOT <0.2 08/28/2021    ALKPHOS 88 08/28/2021    AST 20 08/28/2021    ALT 10 08/28/2021    LABGLOM >60 08/28/2021    GFRAA >59 08/28/2021     Lab Results   Component Value Date     08/28/2021    K 3.6 08/28/2021     08/28/2021    CO2 28 08/28/2021    BUN 8 08/28/2021    CREATININE 0.5 08/28/2021    GLUCOSE 95 08/28/2021    CALCIUM 9.2 08/28/2021      No results found for: CHOL  No results found for: TRIG  No results found for: HDL  No results found for: LDLCHOLESTEROL, LDLCALC  No results found for: LABVLDL, VLDL  No results found for: CHOLHDLRATIO  No results found for: LABA1C  No results found for: EAG  No results found for: TSHFT4, TSH  No results found for: VITD25    Assessments Administered:      Assessment:   1. Bipolar II disorder, mild, depressed, with anxious distress ([facility] [address]    2. Generalized anxiety disorder with panic attacks    3. Chronic post-traumatic stress disorder (PTSD)    4. Insomnia due to other mental disorder         No evidence of acute suicidality, homicidality or psychosis observed today. Psychiatrically stable. Plan:  1. Continue current regimen. The risks, benefits, side effects, indications, contraindications, and adverse effects of the medications have been discussed. Yes.  2. The pt has verbalized understanding and has capacity to give informed consent. 3. The Mina Dukes report has been reviewed according to [facility] regulations. 4. Supportive therapy offered. Patient will continue seeing her therapist at the clinic. 5. Follow up: Return in about 3 months (around 5/16/2022). 6. The patient has been advised to call with any problems. 7. Controlled substance Treatment Plan: NA  8. The above listed medications have been continued, modifications in meds and other orders/labs as follows: No orders of the defined types were placed in this encounter. No orders of the defined types were placed in this encounter. 9. Additional comments: Consider checking thyroid function and vitamin B12 and D levels       10. Over 50% of the total visit time of 30  minutes was spent on counseling and/or coordination of care of:                        1. Bipolar II disorder, mild, depressed, with anxious distress ([facility] [address]    2. Generalized anxiety disorder with panic attacks    3. Chronic post-traumatic stress disorder (PTSD)    4.  Insomnia due to other mental disorder        Jayden Ashton MD

## 2022-02-28 ENCOUNTER — TELEMEDICINE (OUTPATIENT)
Dept: PSYCHOLOGY | Age: 32
End: 2022-02-28
Payer: COMMERCIAL

## 2022-02-28 DIAGNOSIS — F31.81 BIPOLAR 2 DISORDER (HCC): Primary | ICD-10-CM

## 2022-02-28 PROCEDURE — 90834 PSYTX W PT 45 MINUTES: CPT | Performed by: SOCIAL WORKER

## 2022-02-28 SDOH — ECONOMIC STABILITY: TRANSPORTATION INSECURITY
IN THE PAST 12 MONTHS, HAS LACK OF TRANSPORTATION KEPT YOU FROM MEETINGS, WORK, OR FROM GETTING THINGS NEEDED FOR DAILY LIVING?: NO

## 2022-02-28 SDOH — ECONOMIC STABILITY: FOOD INSECURITY: WITHIN THE PAST 12 MONTHS, THE FOOD YOU BOUGHT JUST DIDN'T LAST AND YOU DIDN'T HAVE MONEY TO GET MORE.: NEVER TRUE

## 2022-02-28 SDOH — ECONOMIC STABILITY: FOOD INSECURITY: WITHIN THE PAST 12 MONTHS, YOU WORRIED THAT YOUR FOOD WOULD RUN OUT BEFORE YOU GOT MONEY TO BUY MORE.: NEVER TRUE

## 2022-02-28 SDOH — ECONOMIC STABILITY: INCOME INSECURITY: IN THE LAST 12 MONTHS, WAS THERE A TIME WHEN YOU WERE NOT ABLE TO PAY THE MORTGAGE OR RENT ON TIME?: NO

## 2022-02-28 SDOH — HEALTH STABILITY: PHYSICAL HEALTH: ON AVERAGE, HOW MANY DAYS PER WEEK DO YOU ENGAGE IN MODERATE TO STRENUOUS EXERCISE (LIKE A BRISK WALK)?: 7 DAYS

## 2022-02-28 SDOH — ECONOMIC STABILITY: HOUSING INSECURITY: IN THE LAST 12 MONTHS, HOW MANY PLACES HAVE YOU LIVED?: 1

## 2022-02-28 SDOH — HEALTH STABILITY: PHYSICAL HEALTH: ON AVERAGE, HOW MANY MINUTES DO YOU ENGAGE IN EXERCISE AT THIS LEVEL?: 60 MIN

## 2022-02-28 SDOH — ECONOMIC STABILITY: TRANSPORTATION INSECURITY
IN THE PAST 12 MONTHS, HAS THE LACK OF TRANSPORTATION KEPT YOU FROM MEDICAL APPOINTMENTS OR FROM GETTING MEDICATIONS?: NO

## 2022-02-28 SDOH — ECONOMIC STABILITY: HOUSING INSECURITY
IN THE LAST 12 MONTHS, WAS THERE A TIME WHEN YOU DID NOT HAVE A STEADY PLACE TO SLEEP OR SLEPT IN A SHELTER (INCLUDING NOW)?: NO

## 2022-02-28 ASSESSMENT — PATIENT HEALTH QUESTIONNAIRE - PHQ9
4. FEELING TIRED OR HAVING LITTLE ENERGY: 1
SUM OF ALL RESPONSES TO PHQ QUESTIONS 1-9: 5
SUM OF ALL RESPONSES TO PHQ QUESTIONS 1-9: 5
3. TROUBLE FALLING OR STAYING ASLEEP: 2
9. THOUGHTS THAT YOU WOULD BE BETTER OFF DEAD, OR OF HURTING YOURSELF: 0
2. FEELING DOWN, DEPRESSED OR HOPELESS: 0
SUM OF ALL RESPONSES TO PHQ QUESTIONS 1-9: 5
SUM OF ALL RESPONSES TO PHQ9 QUESTIONS 1 & 2: 1
SUM OF ALL RESPONSES TO PHQ QUESTIONS 1-9: 5
8. MOVING OR SPEAKING SO SLOWLY THAT OTHER PEOPLE COULD HAVE NOTICED. OR THE OPPOSITE, BEING SO FIGETY OR RESTLESS THAT YOU HAVE BEEN MOVING AROUND A LOT MORE THAN USUAL: 1
1. LITTLE INTEREST OR PLEASURE IN DOING THINGS: 1
7. TROUBLE CONCENTRATING ON THINGS, SUCH AS READING THE NEWSPAPER OR WATCHING TELEVISION: 0
5. POOR APPETITE OR OVEREATING: 0
10. IF YOU CHECKED OFF ANY PROBLEMS, HOW DIFFICULT HAVE THESE PROBLEMS MADE IT FOR YOU TO DO YOUR WORK, TAKE CARE OF THINGS AT HOME, OR GET ALONG WITH OTHER PEOPLE: 0
6. FEELING BAD ABOUT YOURSELF - OR THAT YOU ARE A FAILURE OR HAVE LET YOURSELF OR YOUR FAMILY DOWN: 0

## 2022-02-28 ASSESSMENT — SOCIAL DETERMINANTS OF HEALTH (SDOH)
HOW OFTEN DO YOU ATTENT MEETINGS OF THE CLUB OR ORGANIZATION YOU BELONG TO?: NEVER
HOW HARD IS IT FOR YOU TO PAY FOR THE VERY BASICS LIKE FOOD, HOUSING, MEDICAL CARE, AND HEATING?: NOT HARD AT ALL
WITHIN THE LAST YEAR, HAVE YOU BEEN HUMILIATED OR EMOTIONALLY ABUSED IN OTHER WAYS BY YOUR PARTNER OR EX-PARTNER?: YES
WITHIN THE LAST YEAR, HAVE TO BEEN RAPED OR FORCED TO HAVE ANY KIND OF SEXUAL ACTIVITY BY YOUR PARTNER OR EX-PARTNER?: NO
HOW OFTEN DO YOU ATTEND CHURCH OR RELIGIOUS SERVICES?: NEVER
WITHIN THE LAST YEAR, HAVE YOU BEEN AFRAID OF YOUR PARTNER OR EX-PARTNER?: YES
HOW OFTEN DO YOU GET TOGETHER WITH FRIENDS OR RELATIVES?: MORE THAN THREE TIMES A WEEK
DO YOU BELONG TO ANY CLUBS OR ORGANIZATIONS SUCH AS CHURCH GROUPS UNIONS, FRATERNAL OR ATHLETIC GROUPS, OR SCHOOL GROUPS?: NO
IN A TYPICAL WEEK, HOW MANY TIMES DO YOU TALK ON THE PHONE WITH FAMILY, FRIENDS, OR NEIGHBORS?: MORE THAN THREE TIMES A WEEK
WITHIN THE LAST YEAR, HAVE YOU BEEN KICKED, HIT, SLAPPED, OR OTHERWISE PHYSICALLY HURT BY YOUR PARTNER OR EX-PARTNER?: NO

## 2022-02-28 ASSESSMENT — ANXIETY QUESTIONNAIRES
7. FEELING AFRAID AS IF SOMETHING AWFUL MIGHT HAPPEN: 1-SEVERAL DAYS
4. TROUBLE RELAXING: 0-NOT AT ALL
5. BEING SO RESTLESS THAT IT IS HARD TO SIT STILL: 0-NOT AT ALL
1. FEELING NERVOUS, ANXIOUS, OR ON EDGE: 1-SEVERAL DAYS
GAD7 TOTAL SCORE: 2
2. NOT BEING ABLE TO STOP OR CONTROL WORRYING: 0-NOT AT ALL
3. WORRYING TOO MUCH ABOUT DIFFERENT THINGS: 0-NOT AT ALL
6. BECOMING EASILY ANNOYED OR IRRITABLE: 0-NOT AT ALL

## 2022-02-28 ASSESSMENT — LIFESTYLE VARIABLES
HOW OFTEN DO YOU HAVE A DRINK CONTAINING ALCOHOL: 2-4 TIMES A MONTH
HOW MANY STANDARD DRINKS CONTAINING ALCOHOL DO YOU HAVE ON A TYPICAL DAY: 1 OR 2

## 2022-02-28 NOTE — PROGRESS NOTES
Rogelio Marsh, was evaluated through a synchronous (real-time) audio-video encounter. The patient (or guardian if applicable) is aware that this is a billable service. Verbal consent to proceed has been obtained within the past 12 months. The visit was conducted pursuant to the emergency declaration under the [facility], [address] [place] [address] authority and the [employer] and [employer] Act. Patient identification was verified, and a caregiver was present when appropriate. The patient was located in a state where the provider was credentialed to provide care. Total time spent for this encounter: 45 minutes    --Dillon Anderson LCSW on 2/28/2022 at 10:33 AM    An electronic signature was used to authenticate this note. Behavioral Health Consultation  Dillon RIOS LCSW  2/28/2022  10:00 AM      Time spent with Patient: 45 minutes  This is patient's fourth  [facility] appointment. Reason for Consult:    Chief Complaint   Patient presents with    Follow-up    Depression    Anxiety     Referring Provider: No referring provider defined for this encounter. Feedback given to PCP. S:  Patient reports problems with feeling depressed and anxious. Pt shared stressors with estranged  and his lack of being a good father. Pt also shared about global conflict and potential WW3. Pt also shared about work stressors and following up with mental health. Discussed boundaries. Pt was also notified of this provider going on leave for 16 weeks, pt would like to wait for provider to return, however was notified of ability to reach out to other providers for support during this providers absents. Addressed current and underlying issues, explored and released associated emotions, explored new ways to deal and cope with these problems.       O:  MSE:    Mood    Depression  Affect    anxiety  Appetite normal  Sleep disturbance Yes  Fatigue Yes  Loss of pleasure Yes  Attention/Concentration    intact  Morbid ideation No  Suicide Assessment    no suicidal ideation        A:  Patient presents for consult due to problems with depression and anxiety. PHQ Scores 2022 2021 2021 10/11/2021 5/3/2021 2021   PHQ2 Score 1 1 5 1 0 1   PHQ9 Score 5 4 15 8 0 4     Interpretation of Total Score Depression Severity: 1-4 = Minimal depression, 5-9 = Mild depression, 10-14 = Moderate depression, 15-19 = Moderately severe depression, 20-27 = Severe depression    BRETT-7  Feeling nervous, anxious, or on edge: 1-Several days  Not able to stop or control worryin-Not at all  Worrying too much about different things: 0-Not at all  Trouble relaxin-Not at all  Being so restless that it's hard to sit still: 0-Not at all  Becoming easily annoyed or irritable: 0-Not at all  Feeling afraid as if something awful might happen: 1-Several days  BRETT-7 Total Score: 2    BRETT-7 score interpretation:  0-4 Subclinical, 5-9 Mild, 10-14 Moderate, 15-21 Severe    Continued consultation is clinically/medically necessary to support in learning new skills and build confidence to deal better with these issues. Patient response to consults, finds new strategies helpful. Diagnosis:    1.  Bipolar 2 disorder ([facility] [address]          Diagnosis Date    Anxiety     Bipolar 2 disorder ([facility] [address] 2021    DDD (degenerative disc disease), lumbosacral 2022    Headache(784.0)     Headache, paroxysmal hemicrania, episodic     History of posttraumatic stress disorder (PTSD) 2020    Migraine     Tension headache          Plan:  Pt interventions:  Trained in strategies for increasing balanced thinking, Provided education, Discussed self-care (sleep, nutrition, rewarding activities, social support, exercise), Established rapport, [place]-setting to identify pt's primary goals for [facility] visit / overall health, Supportive techniques, Emphasized self-care as important for managing overall health and Identified maladaptive thoughts      Pt Behavioral Change Plan:  See Pt Instructions

## 2022-02-28 NOTE — PATIENT INSTRUCTIONS
If you receive a survey after visiting one of our offices, please take time to share your experience about your office visit. These surveys are confidential and no health information about you is shared. We highly welcome your feedback to continuously improve our services for you. Theodora Pollard, [phone] and choose the option for behavioral health  You can also send her a message on My Chart. Be aware that all my messages are linked to her. What are Personal Boundaries? © 2016 [facility] 1 Provided by [employer]      Personal boundaries are the limits and rules we set for ourselves within relationships. A person with healthy boundaries can say no to others when they want to, but they are also comfortable opening themselves up to intimacy and close relationships. A person who always keeps others at a distance (whether emotionally, physically, or  otherwise) is said to have rigid boundaries. Alternatively, someone who tends to get too  involved with others has porous boundaries. Common traits of rigid, porous, and healthy boundaries    Rigid Boundaries    Avoids intimacy and close relationships. Unlikely to ask for help. Has few close relationships. Very protective of personal information. May seem detached, even with romantic partners. Keeps others at a distance to avoid the possibility of Rejection. Porous Boundaries  Overshares personal information. Difficulty saying no to the requests of others. Overinvolved with others problems. Dependent on the opinions of others. Accepting of abuse or disrespect. Fears rejection if they do not comply with others. Healthy Boundaries  Values own opinions. Doesnt compromise values for others. Shares personal information in an appropriate way (does not  over or under share). Knows personal wants and needs, and can communicate them. Accepting when others say no to them. Most people have a mix of different boundary types. For example,  someone could have healthy boundaries at work, porous boundaries in  romantic relationships, and a mix of all three types with their family. One  size does not fit all! The appropriateness of boundaries depends heavily on setting. Whats  appropriate to say when youre out with friends might not be appropriate  when youre at work. Some cultures have very different expectations when it comes to  boundaries. For example, in some cultures its considered wildly  inappropriate to express emotions publicly. In other cultures, emotional  expression is encouraged. [place] Exploration    Think about a person, or a group of people, with whom you struggle to set healthy boundaries. This could mean that your boundaries are too rigid (you keep this person at a distance), too porous (you open up too much), or theres some other problem that isnt so easily labeled. Who do you struggle to set healthy boundaries with? (e.g. my  or coworkers):____________________________________________________________    In your relationship with the person you listed above, how are your boundaries in each of the following categories? Add a check in the appropriate column for each boundary category. [place] Category   Porous   Rigid   Healthy   Other    Physical Boundaries              Intellectual Boundaries             Emotional Boundaries              Sexual Boundaries              Material Boundaries              Time Boundaries              Take a moment to imagine what it will be like when you begin to establish healthy boundaries with this person. If your boundaries are too rigid, that might mean opening up. If theyre porous, it might mean setting limits and saying no when you dont want to do something. What are some specific actions you can take to improve your boundaries? How do you think the other person will respond to these changes?         How do you think your life will be different once youve established healthy boundaries?

## 2022-03-09 ENCOUNTER — TELEPHONE (OUTPATIENT)
Dept: PSYCHIATRY | Age: 32
End: 2022-03-09

## 2022-03-09 NOTE — TELEPHONE ENCOUNTER
Called and lvm reminding pt of a UDS that need to be done    Electronically signed by Mark Mathews on 3/9/2022 at 10:41 AM

## 2022-03-10 ENCOUNTER — OFFICE VISIT (OUTPATIENT)
Dept: FAMILY MEDICINE CLINIC | Age: 32
End: 2022-03-10
Payer: COMMERCIAL

## 2022-03-10 ENCOUNTER — HOSPITAL ENCOUNTER (OUTPATIENT)
Dept: GENERAL RADIOLOGY | Age: 32
Discharge: HOME | End: 2022-03-10
Payer: COMMERCIAL

## 2022-03-10 VITALS
DIASTOLIC BLOOD PRESSURE: 76 MMHG | OXYGEN SATURATION: 98 % | HEIGHT: 65 IN | HEART RATE: 82 BPM | SYSTOLIC BLOOD PRESSURE: 118 MMHG | WEIGHT: 142 LBS | TEMPERATURE: 97.7 F | BODY MASS INDEX: 23.66 KG/M2

## 2022-03-10 DIAGNOSIS — N20.0 NEPHROLITHIASIS: ICD-10-CM

## 2022-03-10 DIAGNOSIS — R10.9 FLANK PAIN: ICD-10-CM

## 2022-03-10 DIAGNOSIS — N30.01 ACUTE CYSTITIS WITH HEMATURIA: ICD-10-CM

## 2022-03-10 DIAGNOSIS — Z51.81 MEDICATION MONITORING ENCOUNTER: ICD-10-CM

## 2022-03-10 DIAGNOSIS — Z87.442 HISTORY OF NEPHROLITHIASIS: ICD-10-CM

## 2022-03-10 DIAGNOSIS — G44.221 CHRONIC TENSION-TYPE HEADACHE, INTRACTABLE: ICD-10-CM

## 2022-03-10 DIAGNOSIS — R31.29 MICROSCOPIC HEMATURIA: ICD-10-CM

## 2022-03-10 DIAGNOSIS — M51.37 DDD (DEGENERATIVE DISC DISEASE), LUMBOSACRAL: Primary | ICD-10-CM

## 2022-03-10 DIAGNOSIS — R30.0 DYSURIA: ICD-10-CM

## 2022-03-10 LAB
APPEARANCE FLUID: ABNORMAL
BILIRUBIN, POC: ABNORMAL
BLOOD URINE, POC: ABNORMAL
CLARITY, POC: CLEAR
COLOR, POC: YELLOW
GLUCOSE URINE, POC: ABNORMAL
KETONES, POC: ABNORMAL
LEUKOCYTE EST, POC: ABNORMAL
NITRITE, POC: POSITIVE
PH, POC: 7.5
PROTEIN, POC: ABNORMAL
SPECIFIC GRAVITY, POC: 1.01
UROBILINOGEN, POC: 0.2

## 2022-03-10 PROCEDURE — 81002 URINALYSIS NONAUTO W/O SCOPE: CPT | Performed by: FAMILY MEDICINE

## 2022-03-10 PROCEDURE — 99214 OFFICE O/P EST MOD 30 MIN: CPT | Performed by: FAMILY MEDICINE

## 2022-03-10 PROCEDURE — 74018 RADEX ABDOMEN 1 VIEW: CPT

## 2022-03-10 RX ORDER — TAMSULOSIN HYDROCHLORIDE 0.4 MG/1
0.4 CAPSULE ORAL DAILY
Qty: 15 CAPSULE | Refills: 0 | Status: SHIPPED | OUTPATIENT
Start: 2022-03-10 | End: 2022-06-01

## 2022-03-10 ASSESSMENT — ENCOUNTER SYMPTOMS
VOMITING: 0
EYE ITCHING: 0
NAUSEA: 0
COLOR CHANGE: 0
BACK PAIN: 0
APNEA: 0
STRIDOR: 0
FACIAL SWELLING: 0
EYE DISCHARGE: 0
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
## 2022-03-11 RX ORDER — CIPROFLOXACIN 500 MG/1
500 TABLET, FILM COATED ORAL 2 TIMES DAILY
Qty: 14 TABLET | Refills: 0 | Status: SHIPPED | OUTPATIENT
Start: 2022-03-11 | End: 2022-03-18

## 2022-03-12 LAB
ORGANISM: ABNORMAL
URINE CULTURE, ROUTINE: ABNORMAL
URINE CULTURE, ROUTINE: ABNORMAL

## 2022-03-18 DIAGNOSIS — G43.019 INTRACTABLE MIGRAINE WITHOUT AURA AND WITHOUT STATUS MIGRAINOSUS: ICD-10-CM

## 2022-03-18 DIAGNOSIS — G44.221 CHRONIC TENSION-TYPE HEADACHE, INTRACTABLE: ICD-10-CM

## 2022-03-19 RX ORDER — BUTALBITAL, ACETAMINOPHEN AND CAFFEINE 300; 40; 50 MG/1; MG/1; MG/1
1 CAPSULE ORAL EVERY 6 HOURS PRN
Qty: 40 CAPSULE | Refills: 1 | Status: SHIPPED | OUTPATIENT
Start: 2022-03-19 | End: 2022-05-31 | Stop reason: SDUPTHER

## 2022-03-28 NOTE — TELEPHONE ENCOUNTER
Requested Prescriptions     Pending Prescriptions Disp Refills    AIMOVIG 140 MG/ML SOAJ [Pharmacy Med Name: AIMOVIG 140MG/ML AUTOINJECTOR] 3 mL 3     Sig: INJECT 140MG UNDER THE SKIN ONCE MONTHLY       Last Office Visit: 12/28/2021  Next Office Visit: 12/29/22  Last Medication Refill:5/4/21 with 3 refills

## 2022-03-29 RX ORDER — ERENUMAB-AOOE 140 MG/ML
INJECTION, SOLUTION SUBCUTANEOUS
Qty: 3 ML | Refills: 3 | Status: SHIPPED | OUTPATIENT
Start: 2022-03-29

## 2022-04-07 NOTE — PROGRESS NOTES
[facility]  OUTPATIENT PHYSICAL THERAPY  DISCHARGE SUMMARY    Date: 2022  Patient Name: Zach Stahl        MRN: [id number]    ACCOUNT #: [de-identified]  : 1990  (34 y.o.)  Gender: female      Diagnosis: LBP     Treatment Diagnosis: Low back pain    Total # of Visits Approved: 12  Total # of Visits to Date: 5    Subjective  Subjective: Right now I'm not hurting, but yesterday it was due to having to sit with patients. (Comment made at last PT session attended 22). Additional Pertinent Hx: She relates increased low back pain X 10 years, worse past 2 years. Objective  Treatments received included strengthening,IADL Training,Neuromuscular Re-education,Home Exercise Program,ROM,Manual Therapy - Soft Tissue Mobilization,Manual Therapy - Joint Manipulation,Pain Management  See objective/subjective data in goals    Assessment  Assessment: Per the last available assessment note written 22, patient's HEP was  issued with pictures and written instructions and reviewed during therapy session. Increased resistance was given for t-band exercises and she was able to go thru exercise routine with only occasional cueing for proper technique. E-stim not performed due to no pain at end of session and per patients request. The remaining 3 scheduled PT visits were not attended and additional sessions were not scheduled. Will formally discharge from PT today. Plan: Discharge from PT today pending further contact from patient in the future. Goals  Short term goals  Time Frame for Short term goals: 3-4 weeks  Short term goal 1: Patient will increase lumbar forward and backward bending by 5 degrees. Short term goal 2: Patinet will improve BARBARA by 5% points. Short term goal 3: Patient will demonstrate functional improvement of core mm strength with performance of plank on forearms and toes, demonstrating good trunk stability, for 30 sec.   Short term goal 4: Patient will demonstrates ability of HEP with min cueing. Long term goals  Time Frame for Long term goals : 6-8 weeks  Long term goal 1: Patient will increase lumbar forward and backward bending by 10 degrees. Long term goal 2: Patinet will improve BARBARA by 7% points. Long term goal 3: Patient will demonstrate functional improvement of core mm strength with performance of plank on forearms and toes, demonstrating good trunk stability, for 45 sec. Long term goal 4: Patient will demonstrates ability of HEP independently.          Electronically signed by Jaspreet Kulkarni PT on 4/7/2022 at 10:45 AM

## 2022-04-15 NOTE — TELEPHONE ENCOUNTER
Driver Anai called to request a refill on her medication. Last office visit : 2/16/2022 Janay Graham MD  Next office visit : 5/18/2022 Janay Graham MD    Requested Prescriptions     Pending Prescriptions Disp Refills    QUEtiapine (SEROQUEL) 100 MG tablet 30 tablet 1     Sig: Take 1 tablet by mouth nightly            Jonathan Gao                2/16/2022 3:53 PM                             Progress Note           Neisha Ospina 1990                             Chief Complaint   Patient presents with    Medication Check            Subjective:    67-year-old white female with history of bipolar disorder, anxiety, panic attacks, presents for follow-up. Currently kept on Lamictal, Wellbutrin, Seroquel. On PRN Klonopin. Patient reports compliance. No SEs.    Bright affect today. Mood stable. Depression 4/10, anxiety 5/10. Sleeping ok. Good appetite. PTSD recently triggered by argument with father. Coping well. Doing well at work. No complaints. Continues to see Thereasa Abide for therapy.     Current Substance Use: Never had any issues with a stimulant, opioid or benzodiazepine abuse. Drank too much in the past.  Currently avoiding alcohol.  Smokes cigarettes.      BP: /76   Pulse 99   Temp 98.5 °F (36.9 °C)   Ht 5' 5\" (1.651 m)   Wt 138 lb 9.6 oz (62.9 kg)   SpO2 99%   BMI 23.06 kg/m²         Review of Systems - 14 point review:  All negative:      Constitutional: (fevers, chills, night sweats, wt loss/gain, change in appetite, fatigue, somnolence)     HEENT: (ear pain or discharge, hearing loss, ear ringing, sinus pressure, nosebleed, nasal discharge, sore throat, oral sores, tooth pain, bleeding gums, hoarse voice, neck pain)      Cardiovascular: (HTN, chest pain, elevated cholesterol/lipids, palpitations, leg swelling, leg pain with walking)     Respiratory: (cough, wheezing, snoring, SOB with activity (dyspnea), SOB while lying flat (orthopnea), awakening with severe SOB (paroxysmal nocturnal dyspnea))     Gastrointestinal: (NVD, constipation, abdominal pain, bright red stools, black tarry stools, stool incontinence)     Genitourinary:  (pelvic pain, burning or frequency of urination, urinary urgency, blood in urine incomplete bladder emptying, urinary incontinence, STD; MEN: testicular pain or swelling, erectile dysfunction; WOMEN: LMP, heavy menstrual bleeding (menorrhagia), irregular periods, postmenopausal bleeding, menstrual pain (dymenorrhea, vaginal discharge)     Musculoskeletal: (bone pain/fracture, joint pain or swelling, musle pain)     Integumentary: (rashes, acne, non-healing sores, itching, breast lumps, breast pain, nipple discharge, hair loss)     Neurologic: (HA, muscle weakness, paresthesias (numbness, coldness, crawling or prickling), memory loss, seizure, dizziness)     Endocrine: (heat or cold intolerance, excessive thirst (polydipsia), excessive hunger (polyphagia))     Immune/Allergic: (hives, seasonal or environmental allergies, HIV exposure)     Hematologic/Lymphatic: (lymph node enlargement, easy bleeding or bruising)     History obtained via chart review and patient     PCP is Jhonny Taveras MD         Current Meds:     Home Medications           Prior to Admission medications    Medication Sig Start Date End Date Taking? Authorizing Provider   cyclobenzaprine (FLEXERIL) 5 MG tablet Take 1 tablet by mouth nightly as needed for Muscle spasms 1/5/22     Gaby Rios MD   butalbital-APAP-caffeine -40 MG CAPS per capsule Take 1 capsule by mouth every 6 hours as needed for Headaches or Migraine 12/28/21 4/27/22   Lucía Donovan MD   topiramate (TOPAMAX) 100 MG tablet TAKE 1 TABLET BY MOUTH TWO TIMES A DAY 12/28/21     Lucía Donovan MD   clonazePAM (KLONOPIN) 0.5 MG tablet Take 0.5 tablets by mouth 2 times daily as needed for Anxiety for up to 2 doses.  12/9/21 12/28/21   Abisai Mayes MD   clonazePAM (KLONOPIN) 0.5 MG tablet Take 0.5 tablets by mouth 2 times daily as needed for Anxiety for up to 14 days. 11/5/21 12/28/21   Erica Cummins MD   QUEtiapine (SEROQUEL) 100 MG tablet Take 1 tablet by mouth nightly 10/27/21 12/28/21   Erica Cummins MD   tranexamic acid (LYSTEDA) 650 MG TABS tablet Take 2 tablets by mouth 3 times daily 10/20/21     CHRIS Dutton   AIMOVIG 140 MG/ML SOAJ INJECT 140 MG INTO THE SKIN EVERY 30 DAYS 5/4/21     Alvaro Ulloa MD   buPROPion (WELLBUTRIN XL) 150 MG extended release tablet TAKE 1 TABLET BY MOUTH ONE TIME A DAY IN THE MORNING 6/1/20     CHRIS Huff NP   lamoTRIgine (LAMICTAL) 150 MG tablet Take 1 tablet by mouth daily  Patient taking differently: Take 200 mg by mouth daily  6/1/20     CHRIS Huff NP            MSE:  Appearance: Appropriately groomed. Made good eye contact. Behavior: Calm, cooperative. No psychomotor retardation/agitation appreciated. Gait unremarkable. No abnormal movements or tremor. Speech: Normal in tone, volume, and quality. No slurring, dysarthria or pressured speech noted. Mood: \"Overall ok\"   Affect: Mood congruent. Thought Process: Appears linear, logical and goal oriented. Causality appears intact. Thought Content: Denies active suicidal and homicidal ideations. No overt delusions or paranoia appreciated. Perceptions: Denies auditory or visual hallucinations at present time. Not responding to internal stimuli. Concentration: Intact. Orientation: to person, place, date, and situation. Language: Intact. Fund of information: Intact. Memory: Recent and remote appear intact. Impulsivity: Limited. Neurovegitative: Fair appetite and sleep. Insight: Fair.    Judgment: Fair.              Lab Results   Component Value Date      08/28/2021     K 3.6 08/28/2021      08/28/2021     CO2 28 08/28/2021     BUN 8 08/28/2021     CREATININE 0.5 08/28/2021     GLUCOSE 95 08/28/2021     CALCIUM 9.2 08/28/2021     PROT 7.6 08/28/2021     LABALBU 4.6 08/28/2021     BILITOT <0.2 08/28/2021     ALKPHOS 88 08/28/2021     AST 20 08/28/2021     ALT 10 08/28/2021     LABGLOM >60 08/28/2021     GFRAA >59 08/28/2021            Lab Results   Component Value Date      08/28/2021     K 3.6 08/28/2021      08/28/2021     CO2 28 08/28/2021     BUN 8 08/28/2021     CREATININE 0.5 08/28/2021     GLUCOSE 95 08/28/2021     CALCIUM 9.2 08/28/2021      No results found for: CHOL  No results found for: TRIG  No results found for: HDL  No results found for: LDLCHOLESTEROL, LDLCALC  No results found for: LABVLDL, VLDL  No results found for: CHOLHDLRATIO  No results found for: LABA1C  No results found for: EAG  No results found for: TSHFT4, TSH  No results found for: VITD25     Assessments Administered:        Assessment:    1. Bipolar II disorder, mild, depressed, with anxious distress ([facility] [address]    2. Generalized anxiety disorder with panic attacks    3. Chronic post-traumatic stress disorder (PTSD)    4. Insomnia due to other mental disorder          No evidence of acute suicidality, homicidality or psychosis observed today. Psychiatrically stable.     Plan:  1. Continue current regimen. The risks, benefits, side effects, indications, contraindications, and adverse effects of the medications have been discussed. Yes.  2. The pt has verbalized understanding and has capacity to give informed consent. 3. The Marinoarito Frankel report has been reviewed according to [facility] regulations. 4. Supportive therapy offered. Patient will continue seeing her therapist at the clinic. 5. Follow up:    Return in about 3 months (around 5/16/2022). 6. The patient has been advised to call with any problems. 7. Controlled substance Treatment Plan: NA  8.  The above listed medications have been continued, modifications in meds and other orders/labs as follows:                   Encounter Medications    No orders of the defined types were placed in this encounter.                  No orders of the defined types were placed in this encounter.        9. Additional comments: Consider checking thyroid function and vitamin B12 and D levels         10. Over 50% of the total visit time of 30  minutes was spent on counseling and/or coordination of care of:                         1. Bipolar II disorder, mild, depressed, with anxious distress ([facility] [address]    2. Generalized anxiety disorder with panic attacks    3. Chronic post-traumatic stress disorder (PTSD)    4.  Insomnia due to other mental disorder          Daniella Dave MD

## 2022-04-16 RX ORDER — QUETIAPINE FUMARATE 100 MG/1
100 TABLET, FILM COATED ORAL NIGHTLY
Qty: 30 TABLET | Refills: 5 | Status: SHIPPED | OUTPATIENT
Start: 2022-04-16 | End: 2022-07-14 | Stop reason: SDUPTHER

## 2022-04-22 DIAGNOSIS — Z79.899 HISTORY OF ONGOING TREATMENT WITH HIGH-RISK MEDICATION: ICD-10-CM

## 2022-04-22 LAB
AMPHETAMINE SCREEN, URINE: NEGATIVE
BARBITURATE SCREEN URINE: NEGATIVE
BENZODIAZEPINE SCREEN, URINE: NEGATIVE
CANNABINOID SCREEN URINE: NEGATIVE
COCAINE METABOLITE SCREEN URINE: NEGATIVE
Lab: NORMAL
OPIATE SCREEN URINE: NEGATIVE

## 2022-04-22 RX ORDER — ERENUMAB-AOOE 140 MG/ML
INJECTION, SOLUTION SUBCUTANEOUS
Qty: 3 ML | Refills: 3 | OUTPATIENT
Start: 2022-04-22

## 2022-04-24 DIAGNOSIS — M51.37 DDD (DEGENERATIVE DISC DISEASE), LUMBOSACRAL: ICD-10-CM

## 2022-04-24 DIAGNOSIS — M26.643 ARTHRITIS OF BOTH TEMPOROMANDIBULAR JOINTS: ICD-10-CM

## 2022-04-25 RX ORDER — CYCLOBENZAPRINE HCL 5 MG
5 TABLET ORAL NIGHTLY PRN
Qty: 30 TABLET | Refills: 1 | Status: SHIPPED | OUTPATIENT
Start: 2022-04-25 | End: 2022-08-10 | Stop reason: SDUPTHER

## 2022-04-25 NOTE — TELEPHONE ENCOUNTER
Requested Prescriptions     Pending Prescriptions Disp Refills    topiramate (TOPAMAX) 100 MG tablet 180 tablet 3     Sig: TAKE 1 TABLET BY MOUTH TWO TIMES A DAY       Last Office Visit: 12/28/2021  Next Office Visit: Visit date not found  Last Medication Refill:12/18/2021 with 3 refills

## 2022-04-26 RX ORDER — TOPIRAMATE 100 MG/1
TABLET, FILM COATED ORAL
Qty: 180 TABLET | Refills: 3 | Status: SHIPPED | OUTPATIENT
Start: 2022-04-26 | End: 2022-10-27 | Stop reason: SDUPTHER

## 2022-05-17 ENCOUNTER — TELEPHONE (OUTPATIENT)
Dept: PSYCHIATRY | Age: 32
End: 2022-05-17

## 2022-05-18 ENCOUNTER — OFFICE VISIT (OUTPATIENT)
Dept: PSYCHIATRY | Age: 32
End: 2022-05-18
Payer: COMMERCIAL

## 2022-05-18 VITALS
BODY MASS INDEX: 23.44 KG/M2 | WEIGHT: 140.7 LBS | TEMPERATURE: 98 F | OXYGEN SATURATION: 99 % | HEART RATE: 102 BPM | HEIGHT: 65 IN | SYSTOLIC BLOOD PRESSURE: 111 MMHG | DIASTOLIC BLOOD PRESSURE: 68 MMHG

## 2022-05-18 DIAGNOSIS — F99 INSOMNIA DUE TO OTHER MENTAL DISORDER: ICD-10-CM

## 2022-05-18 DIAGNOSIS — F51.05 INSOMNIA DUE TO OTHER MENTAL DISORDER: ICD-10-CM

## 2022-05-18 DIAGNOSIS — F31.81 BIPOLAR II DISORDER, MILD, DEPRESSED, WITH ANXIOUS DISTRESS (HCC): Primary | ICD-10-CM

## 2022-05-18 DIAGNOSIS — F41.1 GENERALIZED ANXIETY DISORDER WITH PANIC ATTACKS: ICD-10-CM

## 2022-05-18 DIAGNOSIS — F41.0 GENERALIZED ANXIETY DISORDER WITH PANIC ATTACKS: ICD-10-CM

## 2022-05-18 DIAGNOSIS — F43.12 CHRONIC POST-TRAUMATIC STRESS DISORDER (PTSD): ICD-10-CM

## 2022-05-18 PROCEDURE — 99214 OFFICE O/P EST MOD 30 MIN: CPT | Performed by: PSYCHIATRY & NEUROLOGY

## 2022-05-18 ASSESSMENT — PATIENT HEALTH QUESTIONNAIRE - PHQ9
4. FEELING TIRED OR HAVING LITTLE ENERGY: 0
7. TROUBLE CONCENTRATING ON THINGS, SUCH AS READING THE NEWSPAPER OR WATCHING TELEVISION: 0
SUM OF ALL RESPONSES TO PHQ QUESTIONS 1-9: 4
SUM OF ALL RESPONSES TO PHQ9 QUESTIONS 1 & 2: 1
2. FEELING DOWN, DEPRESSED OR HOPELESS: 1
SUM OF ALL RESPONSES TO PHQ QUESTIONS 1-9: 4
1. LITTLE INTEREST OR PLEASURE IN DOING THINGS: 0
3. TROUBLE FALLING OR STAYING ASLEEP: 1
SUM OF ALL RESPONSES TO PHQ QUESTIONS 1-9: 4
8. MOVING OR SPEAKING SO SLOWLY THAT OTHER PEOPLE COULD HAVE NOTICED. OR THE OPPOSITE, BEING SO FIGETY OR RESTLESS THAT YOU HAVE BEEN MOVING AROUND A LOT MORE THAN USUAL: 0
9. THOUGHTS THAT YOU WOULD BE BETTER OFF DEAD, OR OF HURTING YOURSELF: 0
SUM OF ALL RESPONSES TO PHQ QUESTIONS 1-9: 4
6. FEELING BAD ABOUT YOURSELF - OR THAT YOU ARE A FAILURE OR HAVE LET YOURSELF OR YOUR FAMILY DOWN: 0
5. POOR APPETITE OR OVEREATING: 2
10. IF YOU CHECKED OFF ANY PROBLEMS, HOW DIFFICULT HAVE THESE PROBLEMS MADE IT FOR YOU TO DO YOUR WORK, TAKE CARE OF THINGS AT HOME, OR GET ALONG WITH OTHER PEOPLE: 0

## 2022-05-18 NOTE — PROGRESS NOTES
5/18/2022 3:43 PM   Progress Note        Dahlia Hampton 1990      Chief Complaint   Patient presents with    Medication Check         Subjective:    76-year-old white female with history of bipolar disorder, anxiety, panic attacks, presents for follow-up. Currently kept on Lamictal, Wellbutrin, Seroquel. Patient reports compliance. No SEs. Bright affect today. Smiling. \"Feeling happy. \". Depression 2/10, anxiety 2/10. Sleeping ok. Good appetite. Functioning well. Doing well at work. Has a new BF - very good relationship. No complaints. Therapist on maternity leave. Current Substance Use: Never had any issues with a stimulant, opioid or benzodiazepine abuse. Drank too much in the past.  Currently avoiding alcohol. Smokes cigarettes.      BP: /68   Pulse 102   Temp 98 °F (36.7 °C)   Ht 5' 5\" (1.651 m)   Wt 140 lb 11.2 oz (63.8 kg)   SpO2 99%   BMI 23.41 kg/m²       Review of Systems - 14 point review:  All negative:     Constitutional: (fevers, chills, night sweats, wt loss/gain, change in appetite, fatigue, somnolence)    HEENT: (ear pain or discharge, hearing loss, ear ringing, sinus pressure, nosebleed, nasal discharge, sore throat, oral sores, tooth pain, bleeding gums, hoarse voice, neck pain)      Cardiovascular: (HTN, chest pain, elevated cholesterol/lipids, palpitations, leg swelling, leg pain with walking)    Respiratory: (cough, wheezing, snoring, SOB with activity (dyspnea), SOB while lying flat (orthopnea), awakening with severe SOB (paroxysmal nocturnal dyspnea))    Gastrointestinal: (NVD, constipation, abdominal pain, bright red stools, black tarry stools, stool incontinence)     Genitourinary:  (pelvic pain, burning or frequency of urination, urinary urgency, blood in urine incomplete bladder emptying, urinary incontinence, STD; MEN: testicular pain or swelling, erectile dysfunction; WOMEN: LMP, heavy menstrual bleeding (menorrhagia), irregular periods, postmenopausal bleeding, menstrual pain (dymenorrhea, vaginal discharge)    Musculoskeletal: (bone pain/fracture, joint pain or swelling, musle pain)    Integumentary: (rashes, acne, non-healing sores, itching, breast lumps, breast pain, nipple discharge, hair loss)    Neurologic: (HA, muscle weakness, paresthesias (numbness, coldness, crawling or prickling), memory loss, seizure, dizziness)    Endocrine: (heat or cold intolerance, excessive thirst (polydipsia), excessive hunger (polyphagia))    Immune/Allergic: (hives, seasonal or environmental allergies, HIV exposure)    Hematologic/Lymphatic: (lymph node enlargement, easy bleeding or bruising)    History obtained via chart review and patient    PCP is Valeria Nash MD       Current Meds:    Prior to Admission medications    Medication Sig Start Date End Date Taking? Authorizing Provider   topiramate (TOPAMAX) 100 MG tablet TAKE 1 TABLET BY MOUTH TWO TIMES A DAY 4/26/22   Mateus Pagan MD   cyclobenzaprine (FLEXERIL) 5 MG tablet Take 1 tablet by mouth nightly as needed for Muscle spasms 4/25/22   Marleny Varner MD   QUEtiapine (SEROQUEL) 100 MG tablet Take 1 tablet by mouth nightly 4/16/22 5/16/22  Caterina Thomas MD   AIMOVIG 140 MG/ML SOAJ INJECT 140MG UNDER THE SKIN ONCE MONTHLY 3/29/22   Mateus Pagan MD   butalbital-APAP-caffeine -40 MG CAPS per capsule Take 1 capsule by mouth every 6 hours as needed for Headaches or Migraine 3/19/22 7/17/22  Francisca Louise MD   tamsulosin [facility]) 0.4 MG capsule Take 1 capsule by mouth daily 3/10/22   Marleny Varner MD   clonazePAM (KLONOPIN) 0.5 MG tablet Take 0.5 tablets by mouth 2 times daily as needed for Anxiety for up to 2 doses.  12/9/21 12/28/21  Caterina Thomas MD   tranexamic acid (LYSTEDA) 650 MG TABS tablet Take 2 tablets by mouth 3 times daily 10/20/21   CHRIS Vela   buPROPion (WELLBUTRIN XL) 150 MG extended release tablet TAKE 1 TABLET BY MOUTH ONE TIME A DAY IN THE MORNING 6/1/20   Radha Stoddard Aleena Lancaster APRN - NP   lamoTRIgine (LAMICTAL) 150 MG tablet Take 1 tablet by mouth daily  Patient taking differently: Take 200 mg by mouth daily  6/1/20   CHRIS Clark NP       MSE:  Appearance: Appropriately groomed. Made good eye contact. Behavior: Calm, cooperative. No psychomotor retardation/agitation appreciated. Gait unremarkable. No abnormal movements or tremor. Speech: Normal in tone, volume, and quality. No slurring, dysarthria or pressured speech noted. Mood: \"Happy\"   Affect: Mood congruent. Thought Process: Appears linear, logical and goal oriented. Causality appears intact. Thought Content: Denies active suicidal and homicidal ideations. No overt delusions or paranoia appreciated. Perceptions: Denies auditory or visual hallucinations at present time. Not responding to internal stimuli. Concentration: Intact. Orientation: to person, place, date, and situation. Language: Intact. Fund of information: Intact. Memory: Recent and remote appear intact. Impulsivity: Limited. Neurovegitative: Fair appetite and sleep. Insight: Fair. Judgment: Fair.       Lab Results   Component Value Date     08/28/2021    K 3.6 08/28/2021     08/28/2021    CO2 28 08/28/2021    BUN 8 08/28/2021    CREATININE 0.5 08/28/2021    GLUCOSE 95 08/28/2021    CALCIUM 9.2 08/28/2021    PROT 7.6 08/28/2021    LABALBU 4.6 08/28/2021    BILITOT <0.2 08/28/2021    ALKPHOS 88 08/28/2021    AST 20 08/28/2021    ALT 10 08/28/2021    LABGLOM >60 08/28/2021    GFRAA >59 08/28/2021     Lab Results   Component Value Date     08/28/2021    K 3.6 08/28/2021     08/28/2021    CO2 28 08/28/2021    BUN 8 08/28/2021    CREATININE 0.5 08/28/2021    GLUCOSE 95 08/28/2021    CALCIUM 9.2 08/28/2021      No results found for: CHOL  No results found for: TRIG  No results found for: HDL  No results found for: LDLCHOLESTEROL, LDLCALC  No results found for: LABVLDL, VLDL  No results found for: CHOLHDLRATIO  No results found for: LABA1C  No results found for: EAG  No results found for: TSHFT4, TSH  No results found for: VITD25    Assessments Administered:      Assessment:   1. Bipolar II disorder, mild, depressed, with anxious distress ([facility] [address]    2. Generalized anxiety disorder with panic attacks    3. Chronic post-traumatic stress disorder (PTSD)    4. Insomnia due to other mental disorder         No evidence of acute suicidality, homicidality or psychosis observed today. Psychiatrically stable. Plan:  1. Continue current regimen. The risks, benefits, side effects, indications, contraindications, and adverse effects of the medications have been discussed. Yes.  2. The pt has verbalized understanding and has capacity to give informed consent. 3. The Eric Seay report has been reviewed according to [facility] regulations. 4. Supportive therapy offered. Patient will continue seeing her therapist.  5. Follow up: Return in about 4 months (around 9/18/2022). 6. The patient has been advised to call with any problems. 7. Controlled substance Treatment Plan: NA  8. The above listed medications have been continued, modifications in meds and other orders/labs as follows: No orders of the defined types were placed in this encounter. No orders of the defined types were placed in this encounter. 9. Additional comments: Consider checking thyroid function and vitamin B12 and D levels       10. Over 50% of the total visit time of 30  minutes was spent on counseling and/or coordination of care of:                        1. Bipolar II disorder, mild, depressed, with anxious distress ([facility] [address]    2. Generalized anxiety disorder with panic attacks    3. Chronic post-traumatic stress disorder (PTSD)    4.  Insomnia due to other mental disorder        Milind Gordon MD

## 2022-05-31 DIAGNOSIS — N94.6 DYSMENORRHEA: ICD-10-CM

## 2022-05-31 DIAGNOSIS — G44.221 CHRONIC TENSION-TYPE HEADACHE, INTRACTABLE: ICD-10-CM

## 2022-05-31 DIAGNOSIS — G43.019 INTRACTABLE MIGRAINE WITHOUT AURA AND WITHOUT STATUS MIGRAINOSUS: ICD-10-CM

## 2022-05-31 DIAGNOSIS — N92.0 MENORRHAGIA WITH REGULAR CYCLE: ICD-10-CM

## 2022-05-31 RX ORDER — BUTALBITAL, ACETAMINOPHEN AND CAFFEINE 300; 40; 50 MG/1; MG/1; MG/1
1 CAPSULE ORAL EVERY 6 HOURS PRN
Qty: 40 CAPSULE | Refills: 1 | Status: SHIPPED | OUTPATIENT
Start: 2022-05-31 | End: 2022-08-19 | Stop reason: SDUPTHER

## 2022-05-31 RX ORDER — TRANEXAMIC ACID 650 1/1
1300 TABLET ORAL 3 TIMES DAILY
Qty: 30 TABLET | Refills: 5 | Status: SHIPPED | OUTPATIENT
Start: 2022-05-31

## 2022-05-31 NOTE — TELEPHONE ENCOUNTER
Requested Prescriptions     Pending Prescriptions Disp Refills    butalbital-APAP-caffeine -40 MG CAPS per capsule 40 capsule 1     Sig: Take 1 capsule by mouth every 6 hours as needed for Headaches or Migraine       Last Office Visit:  12/28/2021  Next Office Visit:  Visit date not found  Last Medication Refill:  3/19/2022 with 1 rf  Elva Carvalho up to date:  5/17/2022     *RX updated to reflect   5/31/2022  fill date*

## 2022-06-01 ENCOUNTER — OFFICE VISIT (OUTPATIENT)
Dept: FAMILY MEDICINE CLINIC | Age: 32
End: 2022-06-01
Payer: COMMERCIAL

## 2022-06-01 VITALS
HEIGHT: 65 IN | WEIGHT: 149 LBS | SYSTOLIC BLOOD PRESSURE: 110 MMHG | HEART RATE: 78 BPM | TEMPERATURE: 97.5 F | DIASTOLIC BLOOD PRESSURE: 72 MMHG | OXYGEN SATURATION: 95 % | BODY MASS INDEX: 24.83 KG/M2

## 2022-06-01 DIAGNOSIS — M51.37 DDD (DEGENERATIVE DISC DISEASE), LUMBOSACRAL: Primary | ICD-10-CM

## 2022-06-01 DIAGNOSIS — N20.0 NEPHROLITHIASIS: ICD-10-CM

## 2022-06-01 PROCEDURE — 99214 OFFICE O/P EST MOD 30 MIN: CPT | Performed by: FAMILY MEDICINE

## 2022-06-01 RX ORDER — MELOXICAM 7.5 MG/1
7.5 TABLET ORAL DAILY
Qty: 30 TABLET | Refills: 1 | Status: SHIPPED | OUTPATIENT
Start: 2022-06-01 | End: 2022-09-21

## 2022-06-01 ASSESSMENT — ENCOUNTER SYMPTOMS
BACK PAIN: 1
FACIAL SWELLING: 0
NAUSEA: 0
VOMITING: 0
APNEA: 0
COLOR CHANGE: 0
EYE ITCHING: 0
STRIDOR: 0
EYE DISCHARGE: 0
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
## 2022-06-04 ENCOUNTER — HOSPITAL ENCOUNTER (EMERGENCY)
Age: 32
Discharge: HOME | End: 2022-06-05
Payer: COMMERCIAL

## 2022-06-04 VITALS
BODY MASS INDEX: 24.83 KG/M2 | TEMPERATURE: 98.5 F | RESPIRATION RATE: 16 BRPM | HEART RATE: 80 BPM | OXYGEN SATURATION: 98 % | SYSTOLIC BLOOD PRESSURE: 118 MMHG | WEIGHT: 149 LBS | HEIGHT: 65 IN | DIASTOLIC BLOOD PRESSURE: 73 MMHG

## 2022-06-04 DIAGNOSIS — L50.9 URTICARIA: Primary | ICD-10-CM

## 2022-06-04 PROCEDURE — 2500000003 HC RX 250 WO HCPCS: Performed by: NURSE PRACTITIONER

## 2022-06-04 PROCEDURE — 96375 TX/PRO/DX INJ NEW DRUG ADDON: CPT

## 2022-06-04 PROCEDURE — 6360000002 HC RX W HCPCS: Performed by: NURSE PRACTITIONER

## 2022-06-04 PROCEDURE — 99284 EMERGENCY DEPT VISIT MOD MDM: CPT

## 2022-06-04 PROCEDURE — 96374 THER/PROPH/DIAG INJ IV PUSH: CPT

## 2022-06-04 PROCEDURE — 2580000003 HC RX 258: Performed by: NURSE PRACTITIONER

## 2022-06-04 RX ORDER — DIPHENHYDRAMINE HYDROCHLORIDE 50 MG/ML
50 INJECTION INTRAMUSCULAR; INTRAVENOUS ONCE
Status: COMPLETED | OUTPATIENT
Start: 2022-06-04 | End: 2022-06-04

## 2022-06-04 RX ORDER — METHYLPREDNISOLONE SODIUM SUCCINATE 125 MG/2ML
125 INJECTION, POWDER, LYOPHILIZED, FOR SOLUTION INTRAMUSCULAR; INTRAVENOUS ONCE
Status: COMPLETED | OUTPATIENT
Start: 2022-06-04 | End: 2022-06-04

## 2022-06-04 RX ORDER — PREDNISONE 20 MG/1
20 TABLET ORAL 2 TIMES DAILY
Qty: 10 TABLET | Refills: 0 | Status: SHIPPED | OUTPATIENT
Start: 2022-06-04 | End: 2022-06-09

## 2022-06-04 RX ORDER — LORAZEPAM 2 MG/ML
1 INJECTION INTRAMUSCULAR ONCE
Status: DISCONTINUED | OUTPATIENT
Start: 2022-06-04 | End: 2022-06-04

## 2022-06-04 RX ORDER — DEXAMETHASONE SODIUM PHOSPHATE 10 MG/ML
8 INJECTION, SOLUTION INTRAMUSCULAR; INTRAVENOUS ONCE
Status: DISCONTINUED | OUTPATIENT
Start: 2022-06-04 | End: 2022-06-04

## 2022-06-04 RX ORDER — DIPHENHYDRAMINE HCL 25 MG
25 TABLET ORAL EVERY 6 HOURS PRN
Qty: 30 TABLET | Refills: 0 | Status: SHIPPED | OUTPATIENT
Start: 2022-06-04 | End: 2022-07-04

## 2022-06-04 RX ADMIN — SODIUM CHLORIDE, PRESERVATIVE FREE 20 MG: 5 INJECTION INTRAVENOUS at 23:20

## 2022-06-04 RX ADMIN — METHYLPREDNISOLONE SODIUM SUCCINATE 125 MG: 125 INJECTION, POWDER, FOR SOLUTION INTRAMUSCULAR; INTRAVENOUS at 23:18

## 2022-06-04 RX ADMIN — DIPHENHYDRAMINE HYDROCHLORIDE 50 MG: 50 INJECTION INTRAMUSCULAR; INTRAVENOUS at 23:22

## 2022-06-04 ASSESSMENT — ENCOUNTER SYMPTOMS
THROAT SWELLING: 0
HOARSE VOICE: 0
WHEEZING: 0
PERI-ORBITAL EDEMA: 0

## 2022-06-04 ASSESSMENT — PAIN SCALES - GENERAL: PAINLEVEL_OUTOF10: 5

## 2022-06-04 ASSESSMENT — PAIN - FUNCTIONAL ASSESSMENT: PAIN_FUNCTIONAL_ASSESSMENT: 0-10

## 2022-06-04 ASSESSMENT — PAIN DESCRIPTION - DESCRIPTORS: DESCRIPTORS: ACHING

## 2022-06-04 ASSESSMENT — PAIN DESCRIPTION - LOCATION: LOCATION: GENERALIZED
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
## 2022-06-20 ENCOUNTER — HOSPITAL ENCOUNTER (EMERGENCY)
Age: 32
Discharge: HOME | End: 2022-06-20
Payer: COMMERCIAL

## 2022-06-20 VITALS
RESPIRATION RATE: 84 BRPM | OXYGEN SATURATION: 96 % | HEIGHT: 65 IN | TEMPERATURE: 98.5 F | BODY MASS INDEX: 24.16 KG/M2 | HEART RATE: 82 BPM | WEIGHT: 145 LBS | SYSTOLIC BLOOD PRESSURE: 102 MMHG | DIASTOLIC BLOOD PRESSURE: 64 MMHG

## 2022-06-20 DIAGNOSIS — K02.9 PAIN DUE TO DENTAL CARIES: ICD-10-CM

## 2022-06-20 DIAGNOSIS — K04.7 DENTAL ABSCESS: Primary | ICD-10-CM

## 2022-06-20 PROCEDURE — 99284 EMERGENCY DEPT VISIT MOD MDM: CPT

## 2022-06-20 PROCEDURE — 96372 THER/PROPH/DIAG INJ SC/IM: CPT

## 2022-06-20 PROCEDURE — 6370000000 HC RX 637 (ALT 250 FOR IP): Performed by: PHYSICIAN ASSISTANT

## 2022-06-20 PROCEDURE — 6360000002 HC RX W HCPCS: Performed by: PHYSICIAN ASSISTANT

## 2022-06-20 RX ORDER — ONDANSETRON 4 MG/1
4 TABLET, ORALLY DISINTEGRATING ORAL ONCE
Status: COMPLETED | OUTPATIENT
Start: 2022-06-20 | End: 2022-06-20

## 2022-06-20 RX ORDER — HYDROCODONE BITARTRATE AND ACETAMINOPHEN 7.5; 325 MG/1; MG/1
1 TABLET ORAL ONCE
Status: COMPLETED | OUTPATIENT
Start: 2022-06-20 | End: 2022-06-20

## 2022-06-20 RX ORDER — ONDANSETRON 4 MG/1
4 TABLET, ORALLY DISINTEGRATING ORAL EVERY 8 HOURS PRN
Qty: 12 TABLET | Refills: 0 | Status: SHIPPED | OUTPATIENT
Start: 2022-06-20 | End: 2022-08-22

## 2022-06-20 RX ORDER — CHLORHEXIDINE GLUCONATE 0.12 MG/ML
15 RINSE ORAL 2 TIMES DAILY
Qty: 420 ML | Refills: 0 | Status: SHIPPED | OUTPATIENT
Start: 2022-06-20 | End: 2022-07-04

## 2022-06-20 RX ORDER — DEXAMETHASONE SODIUM PHOSPHATE 10 MG/ML
6 INJECTION, SOLUTION INTRAMUSCULAR; INTRAVENOUS ONCE
Status: COMPLETED | OUTPATIENT
Start: 2022-06-20 | End: 2022-06-20

## 2022-06-20 RX ORDER — AMOXICILLIN AND CLAVULANATE POTASSIUM 875; 125 MG/1; MG/1
1 TABLET, FILM COATED ORAL 2 TIMES DAILY
Qty: 20 TABLET | Refills: 0 | Status: SHIPPED | OUTPATIENT
Start: 2022-06-20 | End: 2022-06-30

## 2022-06-20 RX ADMIN — Medication 10 ML: at 21:00

## 2022-06-20 RX ADMIN — HYDROCODONE BITARTRATE AND ACETAMINOPHEN 1 TABLET: 7.5; 325 TABLET ORAL at 21:00

## 2022-06-20 RX ADMIN — ONDANSETRON 4 MG: 4 TABLET, ORALLY DISINTEGRATING ORAL at 21:00

## 2022-06-20 RX ADMIN — DEXAMETHASONE SODIUM PHOSPHATE 6 MG: 10 INJECTION, SOLUTION INTRAMUSCULAR; INTRAVENOUS at 21:00

## 2022-06-20 NOTE — Clinical Note
Amari Crowe was seen and treated in our emergency department on 6/20/2022. She may return to work on 06/22/2022. If you have any questions or concerns, please don't hesitate to call.       Xuan Irby PA-C

## 2022-06-20 NOTE — Clinical Note
Trini Caballero was seen and treated in our emergency department on 6/20/2022. She may return to work on 06/22/2022. If you have any questions or concerns, please don't hesitate to call.       Kailyn Puente PA-C

## 2022-06-21 ASSESSMENT — ENCOUNTER SYMPTOMS
VOMITING: 0
SORE THROAT: 0
FACIAL SWELLING: 1
TROUBLE SWALLOWING: 0
GASTROINTESTINAL NEGATIVE: 1
RESPIRATORY NEGATIVE: 1
EYES NEGATIVE: 1
NAUSEA: 0
SINUS PRESSURE: 0
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
## 2022-06-30 ENCOUNTER — OFFICE VISIT (OUTPATIENT)
Dept: FAMILY MEDICINE CLINIC | Age: 32
End: 2022-06-30
Payer: COMMERCIAL

## 2022-06-30 ENCOUNTER — OFFICE VISIT (OUTPATIENT)
Dept: PSYCHOLOGY | Age: 32
End: 2022-06-30
Payer: COMMERCIAL

## 2022-06-30 VITALS
TEMPERATURE: 98.3 F | DIASTOLIC BLOOD PRESSURE: 60 MMHG | HEIGHT: 65 IN | HEART RATE: 85 BPM | BODY MASS INDEX: 24.29 KG/M2 | WEIGHT: 145.8 LBS | OXYGEN SATURATION: 99 % | SYSTOLIC BLOOD PRESSURE: 115 MMHG

## 2022-06-30 DIAGNOSIS — Z78.9 PATIENT REQUESTS ALTERNATE TREATMENT: ICD-10-CM

## 2022-06-30 DIAGNOSIS — F31.81 BIPOLAR 2 DISORDER (HCC): Primary | ICD-10-CM

## 2022-06-30 DIAGNOSIS — Z86.69 HISTORY OF EUSTACHIAN TUBE DYSFUNCTION: ICD-10-CM

## 2022-06-30 DIAGNOSIS — H92.02 LEFT EAR PAIN: Primary | ICD-10-CM

## 2022-06-30 PROCEDURE — 90834 PSYTX W PT 45 MINUTES: CPT | Performed by: SOCIAL WORKER

## 2022-06-30 PROCEDURE — 99213 OFFICE O/P EST LOW 20 MIN: CPT | Performed by: NURSE PRACTITIONER

## 2022-06-30 RX ORDER — CIPROFLOXACIN AND DEXAMETHASONE 3; 1 MG/ML; MG/ML
4 SUSPENSION/ DROPS AURICULAR (OTIC) 2 TIMES DAILY
Qty: 7.5 ML | Refills: 0 | Status: SHIPPED | OUTPATIENT
Start: 2022-06-30 | End: 2022-07-07

## 2022-06-30 RX ORDER — FLUTICASONE PROPIONATE 50 MCG
2 SPRAY, SUSPENSION (ML) NASAL DAILY
Qty: 16 G | Refills: 0 | Status: SHIPPED | OUTPATIENT
Start: 2022-06-30 | End: 2022-07-20

## 2022-06-30 ASSESSMENT — ENCOUNTER SYMPTOMS
EYES NEGATIVE: 1
SORE THROAT: 0
RHINORRHEA: 0
ALLERGIC/IMMUNOLOGIC NEGATIVE: 1
RESPIRATORY NEGATIVE: 1
GASTROINTESTINAL NEGATIVE: 1

## 2022-06-30 ASSESSMENT — COLUMBIA-SUICIDE SEVERITY RATING SCALE - C-SSRS
1. WITHIN THE PAST MONTH, HAVE YOU WISHED YOU WERE DEAD OR WISHED YOU COULD GO TO SLEEP AND NOT WAKE UP?: YES
2. HAVE YOU ACTUALLY HAD ANY THOUGHTS OF KILLING YOURSELF?: NO
6. HAVE YOU EVER DONE ANYTHING, STARTED TO DO ANYTHING, OR PREPARED TO DO ANYTHING TO END YOUR LIFE?: NO

## 2022-06-30 ASSESSMENT — PATIENT HEALTH QUESTIONNAIRE - PHQ9
5. POOR APPETITE OR OVEREATING: 1
8. MOVING OR SPEAKING SO SLOWLY THAT OTHER PEOPLE COULD HAVE NOTICED. OR THE OPPOSITE, BEING SO FIGETY OR RESTLESS THAT YOU HAVE BEEN MOVING AROUND A LOT MORE THAN USUAL: 0
SUM OF ALL RESPONSES TO PHQ QUESTIONS 1-9: 8
7. TROUBLE CONCENTRATING ON THINGS, SUCH AS READING THE NEWSPAPER OR WATCHING TELEVISION: 0
4. FEELING TIRED OR HAVING LITTLE ENERGY: 1
SUM OF ALL RESPONSES TO PHQ QUESTIONS 1-9: 7
9. THOUGHTS THAT YOU WOULD BE BETTER OFF DEAD, OR OF HURTING YOURSELF: 1
SUM OF ALL RESPONSES TO PHQ9 QUESTIONS 1 & 2: 3
3. TROUBLE FALLING OR STAYING ASLEEP: 1
6. FEELING BAD ABOUT YOURSELF - OR THAT YOU ARE A FAILURE OR HAVE LET YOURSELF OR YOUR FAMILY DOWN: 1
1. LITTLE INTEREST OR PLEASURE IN DOING THINGS: 1
SUM OF ALL RESPONSES TO PHQ QUESTIONS 1-9: 8
10. IF YOU CHECKED OFF ANY PROBLEMS, HOW DIFFICULT HAVE THESE PROBLEMS MADE IT FOR YOU TO DO YOUR WORK, TAKE CARE OF THINGS AT HOME, OR GET ALONG WITH OTHER PEOPLE: 0
2. FEELING DOWN, DEPRESSED OR HOPELESS: 2
SUM OF ALL RESPONSES TO PHQ QUESTIONS 1-9: 8

## 2022-06-30 ASSESSMENT — ANXIETY QUESTIONNAIRES
7. FEELING AFRAID AS IF SOMETHING AWFUL MIGHT HAPPEN: 0-NOT AT ALL
5. BEING SO RESTLESS THAT IT IS HARD TO SIT STILL: 0-NOT AT ALL
1. FEELING NERVOUS, ANXIOUS, OR ON EDGE: 1
2. NOT BEING ABLE TO STOP OR CONTROL WORRYING: 1-SEVERAL DAYS
3. WORRYING TOO MUCH ABOUT DIFFERENT THINGS: 1-SEVERAL DAYS
4. TROUBLE RELAXING: 1-SEVERAL DAYS
GAD7 TOTAL SCORE: 5
6. BECOMING EASILY ANNOYED OR IRRITABLE: 1-SEVERAL DAYS
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
## 2022-06-30 NOTE — PROGRESS NOTES
Behavioral Health Consultation  [facility] MSW, LCSW  2022  11:00 AM      Time spent with Patient: 45 minutes  This is patient's fifth  [facility] appointment. Reason for Consult:    Chief Complaint   Patient presents with    Follow-up    Depression     Referring Provider: No referring provider defined for this encounter. Feedback given to PCP. S:  Patient reports problems with feeling anxious and depressed. Pt shared her daughter was having bladder control issues, she is having difficulties with filing for divorce, shared about her 6 month relationship with her boyfriend, living with her sister, issues with her son's education stressors. Pt reported she has had anxiety attacks and was tearful. Discussed how pt can repeat cycles with her behaviors and angry that are learned behavior, unnecessary, and completely within her abilities to change by setting boundaries and completing tasks. Addressed current and underlying issues, explored and released associated emotions, explored new ways to deal and cope with these problems. O:  MSE:    Mood    Anxious  Affect    normal affect  Appetite normal  Sleep disturbance No  Fatigue No  Loss of pleasure No  Attention/Concentration    intact  Morbid ideation No  Suicide Assessment    no suicidal ideation        A:  Patient presents for consult due to problems with anxiety and depression.     PHQ Scores 2022 2022 2022 2021 2021 10/11/2021 5/3/2021   PHQ2 Score 3 1 1 1 5 1 0   PHQ9 Score 8 4 5 4 15 8 0     Interpretation of Total Score Depression Severity: 1-4 = Minimal depression, 5-9 = Mild depression, 10-14 = Moderate depression, 15-19 = Moderately severe depression, 20-27 = Severe depression    BRETT-7  Feeling nervous, anxious, or on edge: Several days  Not able to stop or control worryin-Several days  Worrying too much about different things: 1-Several days  Trouble relaxin-Several days  Being so restless that it's hard to sit still: 0-Not at all  Becoming easily annoyed or irritable: 1-Several days  Feeling afraid as if something awful might happen: 0-Not at all  BRETT-7 Total Score: 5    BRETT-7 score interpretation:  0-4 Subclinical, 5-9 Mild, 10-14 Moderate, 15-21 Severe    Continued consultation is clinically/medically necessary to support in learning new skills and build confidence to deal better with these issues. Patient response to consults, finds new strategies helpful. Diagnosis:    1.  [facility] 2 [facility] ([facility]          Diagnosis Date    Anxiety     B[facility] 2 disorder ([facility] 01/25/2021    DDD (degenerative disc disease), lumbosacral 1/5/2022    Headache(784.0)     Headache, paroxysmal hemicrania, episodic     History of posttraumatic stress disorder (PTSD) 12/01/2020    Migraine     Tension headache          Plan:  Pt interventions:  Trained in strategies for increasing balanced thinking, Provided education, Discussed self-care (sleep, nutrition, rewarding activities, social support, exercise), Established rapport, [place]-setting to identify pt's primary goals for [facility] visit / overall health, Supportive techniques, Emphasized self-care as important for managing overall health and Identified maladaptive thoughts      Pt Behavioral Change Plan:  See Pt Instructions

## 2022-07-14 ENCOUNTER — TELEPHONE (OUTPATIENT)
Dept: PSYCHIATRY | Age: 32
End: 2022-07-14

## 2022-07-14 DIAGNOSIS — G44.221 CHRONIC TENSION-TYPE HEADACHE, INTRACTABLE: ICD-10-CM

## 2022-07-14 DIAGNOSIS — G43.019 INTRACTABLE MIGRAINE WITHOUT AURA AND WITHOUT STATUS MIGRAINOSUS: ICD-10-CM

## 2022-07-14 RX ORDER — BUTALBITAL, ACETAMINOPHEN AND CAFFEINE 300; 40; 50 MG/1; MG/1; MG/1
1 CAPSULE ORAL EVERY 6 HOURS PRN
Qty: 40 CAPSULE | Refills: 1 | OUTPATIENT
Start: 2022-07-14 | End: 2022-11-11

## 2022-07-14 RX ORDER — QUETIAPINE FUMARATE 100 MG/1
100 TABLET, FILM COATED ORAL NIGHTLY
Qty: 30 TABLET | Refills: 0 | Status: SHIPPED | OUTPATIENT
Start: 2022-07-14 | End: 2022-08-02 | Stop reason: SDUPTHER

## 2022-07-14 NOTE — TELEPHONE ENCOUNTER
Kameron Crew called to request a refill on her medication. Last office visit : 5/18/2022 Lindsey Walden MD  Next office visit : 9/21/2022 Lindsey Walden MD    Requested Prescriptions     Pending Prescriptions Disp Refills    QUEtiapine (SEROQUEL) 100 MG tablet 30 tablet 5     Sig: Take 1 tablet by mouth nightly            Jonathan Gao        5/18/2022 3:43 PM                             Progress Note           Kameron Crew 1990                             Chief Complaint   Patient presents with    Medication Check            Subjective:    67-year-old white female with history of bipolar disorder, anxiety, panic attacks, presents for follow-up. Currently kept on Lamictal, Wellbutrin, Seroquel. Patient reports compliance. No SEs.    Bright affect today. Smiling. \"Feeling happy. \". Depression 2/10, anxiety 2/10. Sleeping ok. Good appetite. Functioning well. Doing well at work. Has a new BF - very good relationship. No complaints. Therapist on maternity leave.      Current Substance Use: Never had any issues with a stimulant, opioid or benzodiazepine abuse. Drank too much in the past.  Currently avoiding alcohol.  Smokes cigarettes.      BP: /68   Pulse 102   Temp 98 °F (36.7 °C)   Ht 5' 5\" (1.651 m)   Wt 140 lb 11.2 oz (63.8 kg)   SpO2 99%   BMI 23.41 kg/m²         Review of Systems - 14 point review:  All negative:      Constitutional: (fevers, chills, night sweats, wt loss/gain, change in appetite, fatigue, somnolence)     HEENT: (ear pain or discharge, hearing loss, ear ringing, sinus pressure, nosebleed, nasal discharge, sore throat, oral sores, tooth pain, bleeding gums, hoarse voice, neck pain)      Cardiovascular: (HTN, chest pain, elevated cholesterol/lipids, palpitations, leg swelling, leg pain with walking)     Respiratory: (cough, wheezing, snoring, SOB with activity (dyspnea), SOB while lying flat (orthopnea), awakening with severe SOB (paroxysmal nocturnal dyspnea))     Gastrointestinal: (NVD, constipation, abdominal pain, bright red stools, black tarry stools, stool incontinence)     Genitourinary:  (pelvic pain, burning or frequency of urination, urinary urgency, blood in urine incomplete bladder emptying, urinary incontinence, STD; MEN: testicular pain or swelling, erectile dysfunction; WOMEN: LMP, heavy menstrual bleeding (menorrhagia), irregular periods, postmenopausal bleeding, menstrual pain (dymenorrhea, vaginal discharge)     Musculoskeletal: (bone pain/fracture, joint pain or swelling, musle pain)     Integumentary: (rashes, acne, non-healing sores, itching, breast lumps, breast pain, nipple discharge, hair loss)     Neurologic: (HA, muscle weakness, paresthesias (numbness, coldness, crawling or prickling), memory loss, seizure, dizziness)     Endocrine: (heat or cold intolerance, excessive thirst (polydipsia), excessive hunger (polyphagia))     Immune/Allergic: (hives, seasonal or environmental allergies, HIV exposure)     Hematologic/Lymphatic: (lymph node enlargement, easy bleeding or bruising)     History obtained via chart review and patient     PCP is Ang Ardon MD         Current Meds:     Home Medications           Prior to Admission medications    Medication Sig Start Date End Date Taking?  Authorizing Provider   topiramate (TOPAMAX) 100 MG tablet TAKE 1 TABLET BY MOUTH TWO TIMES A DAY 4/26/22     Luis Alberto Baum MD   cyclobenzaprine (FLEXERIL) 5 MG tablet Take 1 tablet by mouth nightly as needed for Muscle spasms 4/25/22     Kari Soler MD   QUEtiapine (SEROQUEL) 100 MG tablet Take 1 tablet by mouth nightly 4/16/22 5/16/22   Kalpesh Brody MD   AIMOVIG 140 MG/ML SOAJ INJECT 140MG UNDER THE SKIN ONCE MONTHLY 3/29/22     Luis Alberto Baum MD   butalbital-APAP-caffeine -40 MG CAPS per capsule Take 1 capsule by mouth every 6 hours as needed for Headaches or Migraine 3/19/22 7/17/22   Karla Ford MD   tamsulosin (FLOMAX) 0.4 MG capsule Take 1 capsule by mouth daily 3/10/22     Breonna Sands MD   clonazePAM (KLONOPIN) 0.5 MG tablet Take 0.5 tablets by mouth 2 times daily as needed for Anxiety for up to 2 doses. 12/9/21 12/28/21   Marva Mosquera MD   tranexamic acid (LYSTEDA) 650 MG TABS tablet Take 2 tablets by mouth 3 times daily 10/20/21     CHRIS Marshall   buPROPion (WELLBUTRIN XL) 150 MG extended release tablet TAKE 1 TABLET BY MOUTH ONE TIME A DAY IN THE MORNING 6/1/20     CHRIS Faye NP   lamoTRIgine (LAMICTAL) 150 MG tablet Take 1 tablet by mouth daily  Patient taking differently: Take 200 mg by mouth daily  6/1/20     CHRIS Faye NP            MSE:  Appearance: Appropriately groomed. Made good eye contact. Behavior: Calm, cooperative. No psychomotor retardation/agitation appreciated. Gait unremarkable. No abnormal movements or tremor. Speech: Normal in tone, volume, and quality. No slurring, dysarthria or pressured speech noted. Mood: \"Happy\"   Affect: Mood congruent. Thought Process: Appears linear, logical and goal oriented. Causality appears intact. Thought Content: Denies active suicidal and homicidal ideations. No overt delusions or paranoia appreciated. Perceptions: Denies auditory or visual hallucinations at present time. Not responding to internal stimuli. Concentration: Intact. Orientation: to person, place, date, and situation. Language: Intact. Fund of information: Intact. Memory: Recent and remote appear intact. Impulsivity: Limited. Neurovegitative: Fair appetite and sleep. Insight: Fair.    Judgment: Fair.              Lab Results   Component Value Date      08/28/2021     K 3.6 08/28/2021      08/28/2021     CO2 28 08/28/2021     BUN 8 08/28/2021     CREATININE 0.5 08/28/2021     GLUCOSE 95 08/28/2021     CALCIUM 9.2 08/28/2021     PROT 7.6 08/28/2021     LABALBU 4.6 08/28/2021     BILITOT <0.2 08/28/2021     ALKPHOS 88 08/28/2021     AST 20 08/28/2021     ALT 10 08/28/2021     LABGLOM >60 08/28/2021     GFRAA >59 08/28/2021            Lab Results   Component Value Date      08/28/2021     K 3.6 08/28/2021      08/28/2021     CO2 28 08/28/2021     BUN 8 08/28/2021     CREATININE 0.5 08/28/2021     GLUCOSE 95 08/28/2021     CALCIUM 9.2 08/28/2021      No results found for: CHOL  No results found for: TRIG  No results found for: HDL  No results found for: LDLCHOLESTEROL, LDLCALC  No results found for: LABVLDL, VLDL  No results found for: CHOLHDLRATIO  No results found for: LABA1C  No results found for: EAG  No results found for: TSHFT4, TSH  No results found for: VITD25     Assessments Administered:        Assessment:    1. Bipolar II disorder, mild, depressed, with anxious distress ([facility]    2. Generalized anxiety disorder with panic attacks    3. Chronic post-traumatic stress disorder (PTSD)    4. Insomnia due to other mental disorder          No evidence of acute suicidality, homicidality or psychosis observed today. Psychiatrically stable.     Plan:  1. Continue current regimen. The risks, benefits, side effects, indications, contraindications, and adverse effects of the medications have been discussed. Yes.  2. The pt has verbalized understanding and has capacity to give informed consent. 3. The Pascual Alcantar report has been reviewed according to [facility] regulations. 4. Supportive therapy offered. Patient will continue seeing her therapist.  5. Follow up:    Return in about 4 months (around 9/18/2022). 6. The patient has been advised to call with any problems. 7. Controlled substance Treatment Plan: NA  8. The above listed medications have been continued, modifications in meds and other orders/labs as follows:                   Encounter Medications    No orders of the defined types were placed in this encounter.                  No orders of the defined types were placed in this encounter.        9.  Additional comments: Consider checking thyroid function and vitamin B12 and D levels         10. Over 50% of the total visit time of 30  minutes was spent on counseling and/or coordination of care of:                         1. Bipolar II disorder, mild, depressed, with anxious distress ([facility]    2. Generalized anxiety disorder with panic attacks    3. Chronic post-traumatic stress disorder (PTSD)    4.  Insomnia due to other mental disorder          Amanda Kate MD

## 2022-07-14 NOTE — TELEPHONE ENCOUNTER
Called and let pt know that her script for quetiapine was sent to her pharmacy     Electronically signed by Vinicio Smith on 7/14/2022 at 11:45 AM

## 2022-07-20 ENCOUNTER — OFFICE VISIT (OUTPATIENT)
Dept: ENT CLINIC | Age: 32
End: 2022-07-20
Payer: COMMERCIAL

## 2022-07-20 VITALS
BODY MASS INDEX: 24.16 KG/M2 | SYSTOLIC BLOOD PRESSURE: 100 MMHG | HEIGHT: 65 IN | DIASTOLIC BLOOD PRESSURE: 62 MMHG | WEIGHT: 145 LBS

## 2022-07-20 DIAGNOSIS — H69.82 EUSTACHIAN TUBE DYSFUNCTION, LEFT: Primary | ICD-10-CM

## 2022-07-20 PROCEDURE — 99214 OFFICE O/P EST MOD 30 MIN: CPT | Performed by: PHYSICIAN ASSISTANT

## 2022-07-20 RX ORDER — PSEUDOEPHEDRINE HYDROCHLORIDE 30 MG/1
30 TABLET ORAL 3 TIMES DAILY
Qty: 30 TABLET | Refills: 2 | Status: SHIPPED | OUTPATIENT
Start: 2022-07-20 | End: 2022-08-22

## 2022-07-20 RX ORDER — METHYLPREDNISOLONE 4 MG/1
TABLET ORAL
Qty: 1 KIT | Refills: 0 | Status: SHIPPED | OUTPATIENT
Start: 2022-07-20 | End: 2022-08-22

## 2022-07-20 RX ORDER — FLUTICASONE PROPIONATE 50 MCG
2 SPRAY, SUSPENSION (ML) NASAL 2 TIMES DAILY
Qty: 16 G | Refills: 2 | Status: SHIPPED | OUTPATIENT
Start: 2022-07-20 | End: 2022-08-22

## 2022-07-20 ASSESSMENT — ENCOUNTER SYMPTOMS
VOICE CHANGE: 0
RHINORRHEA: 0
EYE DISCHARGE: 0
FACIAL SWELLING: 0
EYE PAIN: 0
SINUS PAIN: 0
SINUS PRESSURE: 0
SORE THROAT: 0
TROUBLE SWALLOWING: 0

## 2022-07-20 NOTE — ASSESSMENT & PLAN NOTE
Eustachian tube dysfunction of the left ear  Plan: I recommended placing the patient on a Medrol Dosepak as well as Sudafed and Flonase. She is to follow-up in 2 weeks for reevaluation.

## 2022-07-20 NOTE — PROGRESS NOTES
[facility] OTOLARYNGOLOGY/ENT  Kirk Perera is a pleasant 26-year-old  female that was referred by Lokesh Pena due to problems with muffled hearing and chronic ear pain. Patient reports that she has a longstanding history of eustachian tube dysfunction and has had multiple myringotomy tubes placed by Dr. Víctor Whitehead in the past.  She reports that currently she is experiencing muffled hearing and sporadic pain to the left ear. She does admit to hearing some popping noises but can never clear her ears. She denies any history of any sinus related problems or any sinus surgeries. Allergies: Morphine and Salonpas [camphor-menthol-methyl sal]      Current Outpatient Medications   Medication Sig Dispense Refill    methylPREDNISolone (MEDROL, MICHELLE,) 4 MG tablet Take by mouth as directed 1 kit 0    pseudoephedrine (DECONGESTANT) 30 MG tablet Take 1 tablet by mouth in the morning and 1 tablet at noon and 1 tablet before bedtime. 30 tablet 2    fluticasone (FLONASE) 50 MCG/ACT nasal spray 2 sprays by Each Nostril route in the morning and 2 sprays in the evening.  16 g 2    QUEtiapine (SEROQUEL) 100 MG tablet Take 1 tablet by mouth nightly 30 tablet 0    ondansetron (ZOFRAN ODT) 4 MG disintegrating tablet Take 1 tablet by mouth every 8 hours as needed for Nausea or Vomiting 12 tablet 0    meloxicam (MOBIC) 7.5 MG tablet Take 1 tablet by mouth daily 30 tablet 1    tranexamic acid (LYSTEDA) 650 MG TABS tablet Take 2 tablets by mouth 3 times daily 30 tablet 5    butalbital-APAP-caffeine -40 MG CAPS per capsule Take 1 capsule by mouth every 6 hours as needed for Headaches or Migraine 40 capsule 1    topiramate (TOPAMAX) 100 MG tablet TAKE 1 TABLET BY MOUTH TWO TIMES A  tablet 3    AIMOVIG 140 MG/ML SOAJ INJECT 140MG UNDER THE SKIN ONCE MONTHLY 3 mL 3    buPROPion (WELLBUTRIN XL) 150 MG extended release tablet TAKE 1 TABLET BY MOUTH ONE TIME A DAY IN THE MORNING 90 tablet 1    lamoTRIgine (LAMICTAL) 150 MG tablet Take 1 tablet by mouth daily (Patient taking differently: Take 200 mg by mouth in the morning.) 90 tablet 1    cyclobenzaprine (FLEXERIL) 5 MG tablet Take 1 tablet by mouth nightly as needed for Muscle spasms (Patient not taking: No sig reported) 30 tablet 1     No current facility-administered medications for this visit. Past Surgical History:   Procedure Laterality Date    ADENOIDECTOMY      CHOLECYSTECTOMY, LAPAROSCOPIC  11/05/2012    MYRINGOTOMY AND TYMPANOSTOMY TUBE PLACEMENT      TUBAL LIGATION         Past Medical History:   Diagnosis Date    Anxiety     Bipolar 2 disorder ([facility] 01/25/2021    DDD (degenerative disc disease), lumbosacral 1/5/2022    Headache(784.0)     Headache, paroxysmal hemicrania, episodic     History of posttraumatic stress disorder (PTSD) 12/01/2020    Migraine     Tension headache        Family History   Problem Relation Age of Onset    Kidney stones Mother     Hypertension Father     Cancer Maternal Grandmother         cervical    Heart Failure Maternal Grandmother     Diabetes Paternal Grandfather     High Blood Pressure Paternal Grandfather     Diabetes Maternal Aunt     Cancer Maternal Cousin         cervical    Cancer Maternal Cousin         cervical       Social History     Tobacco Use    Smoking status: Every Day     Packs/day: 1.00     Types: Cigarettes     Start date: 2005    Smokeless tobacco: Never   Substance Use Topics    Alcohol use: Yes     Comment: rarely           REVIEW OF SYSTEMS:  all other systems reviewed and are negative  Review of Systems   Constitutional:  Negative for chills and fever. HENT:  Positive for congestion, ear discharge and ear pain. Negative for dental problem, facial swelling, hearing loss, nosebleeds, postnasal drip, rhinorrhea, sinus pressure, sinus pain, sneezing, sore throat, tinnitus, trouble swallowing and voice change. Eyes:  Negative for pain and discharge. Neurological:  Negative for dizziness and headaches. Comments:     PHYSICAL EXAM:    /62   Ht 5' 5\" (1.651 m)   Wt 145 lb (65.8 kg)   BMI 24.13 kg/m²   Body mass index is 24.13 kg/m². General Appearance: well developed  and well nourished  Head/ Face: normocephalic and atraumatic  Vocal Quality: good/ normal  Ears: Right Ear: External: external ears normal Otoscopy Ear Canal: canal clear Otoscopy TM: TM's normal and TM's mobile Left Ear: External: external ears normal Otoscopy Ear Canal: canal clear Otoscopy TM: TM's dull and TM's sluggish  Hearing: Tellez L  Nose: nares normal and septum midline  Neck: supple and adenopathy none palpable  Thyroid: normal and nodules No    Assessment & Plan:    Problem List Items Addressed This Visit       Eustachian tube dysfunction, left - Primary     Eustachian tube dysfunction of the left ear  Plan: I recommended placing the patient on a Medrol Dosepak as well as Sudafed and Flonase. She is to follow-up in 2 weeks for reevaluation. No orders of the defined types were placed in this encounter. Orders Placed This Encounter   Medications    methylPREDNISolone (MEDROL, MICHELLE,) 4 MG tablet     Sig: Take by mouth as directed     Dispense:  1 kit     Refill:  0    pseudoephedrine (DECONGESTANT) 30 MG tablet     Sig: Take 1 tablet by mouth in the morning and 1 tablet at noon and 1 tablet before bedtime. Dispense:  30 tablet     Refill:  2    fluticasone (FLONASE) 50 MCG/ACT nasal spray     Si sprays by Each Nostril route in the morning and 2 sprays in the evening. Dispense:  16 g     Refill:  2       Electronically signed by Bang Benjamin PA-C on 22 at 1:33 PM CDT        Please note that this chart was generated using dragon dictation software. Although every effort was made to ensure the accuracy of this automated transcription, some errors in transcription may have occurred.

## 2022-07-21 ENCOUNTER — TELEPHONE (OUTPATIENT)
Dept: PSYCHIATRY | Age: 32
End: 2022-07-21

## 2022-07-21 NOTE — TELEPHONE ENCOUNTER
Pt called stating that she needed to get her appt moved up because she thinks that some of her medication needs to be changed. Scheduled for 8/2 @ 11:30.     Electronically signed by Ayleen Blackwell MA on 7/21/2022 at 1:19 PM

## 2022-07-27 ENCOUNTER — HOSPITAL ENCOUNTER (EMERGENCY)
Age: 32
Discharge: HOME | End: 2022-07-27
Attending: EMERGENCY MEDICINE
Payer: COMMERCIAL

## 2022-07-27 ENCOUNTER — APPOINTMENT (OUTPATIENT)
Dept: CT IMAGING | Age: 32
End: 2022-07-27
Payer: COMMERCIAL

## 2022-07-27 VITALS
WEIGHT: 142 LBS | OXYGEN SATURATION: 99 % | SYSTOLIC BLOOD PRESSURE: 98 MMHG | HEART RATE: 78 BPM | HEIGHT: 65 IN | RESPIRATION RATE: 16 BRPM | BODY MASS INDEX: 23.66 KG/M2 | DIASTOLIC BLOOD PRESSURE: 71 MMHG | TEMPERATURE: 97.6 F

## 2022-07-27 DIAGNOSIS — N20.0 KIDNEY STONE: ICD-10-CM

## 2022-07-27 DIAGNOSIS — R10.9 ABDOMINAL PAIN, UNSPECIFIED ABDOMINAL LOCATION: Primary | ICD-10-CM

## 2022-07-27 LAB
ALBUMIN SERPL-MCNC: 4.4 G/DL (ref 3.5–5.2)
ALP BLD-CCNC: 73 U/L (ref 35–104)
ALT SERPL-CCNC: 8 U/L (ref 5–33)
ANION GAP SERPL CALCULATED.3IONS-SCNC: 9 MMOL/L (ref 7–19)
AST SERPL-CCNC: 11 U/L (ref 5–32)
BACTERIA: ABNORMAL /HPF
BASOPHILS ABSOLUTE: 0.1 K/UL (ref 0–0.2)
BASOPHILS RELATIVE PERCENT: 0.7 % (ref 0–1)
BILIRUB SERPL-MCNC: <0.2 MG/DL (ref 0.2–1.2)
BILIRUBIN URINE: NEGATIVE
BLOOD, URINE: ABNORMAL
BUN BLDV-MCNC: 14 MG/DL (ref 6–20)
CALCIUM SERPL-MCNC: 9.1 MG/DL (ref 8.6–10)
CHLORIDE BLD-SCNC: 110 MMOL/L (ref 98–111)
CLARITY: CLEAR
CO2: 21 MMOL/L (ref 22–29)
COARSE CASTS, UA: ABNORMAL /LPF (ref 0–5)
COLOR: YELLOW
CREAT SERPL-MCNC: 0.6 MG/DL (ref 0.5–0.9)
CRYSTALS, UA: ABNORMAL /HPF
EOSINOPHILS ABSOLUTE: 0.1 K/UL (ref 0–0.6)
EOSINOPHILS RELATIVE PERCENT: 0.8 % (ref 0–5)
EPITHELIAL CELLS, UA: ABNORMAL /HPF
GFR AFRICAN AMERICAN: >59
GFR NON-AFRICAN AMERICAN: >60
GLUCOSE BLD-MCNC: 99 MG/DL (ref 74–109)
GLUCOSE URINE: NEGATIVE MG/DL
HCG QUALITATIVE: NEGATIVE
HCT VFR BLD CALC: 37.3 % (ref 37–47)
HEMOGLOBIN: 12.4 G/DL (ref 12–16)
HYALINE CASTS: ABNORMAL /LPF (ref 0–5)
IMMATURE GRANULOCYTES #: 0 K/UL
KETONES, URINE: NEGATIVE MG/DL
LEUKOCYTE ESTERASE, URINE: NEGATIVE
LYMPHOCYTES ABSOLUTE: 1.6 K/UL (ref 1.1–4.5)
LYMPHOCYTES RELATIVE PERCENT: 16.4 % (ref 20–40)
MCH RBC QN AUTO: 31.2 PG (ref 27–31)
MCHC RBC AUTO-ENTMCNC: 33.2 G/DL (ref 33–37)
MCV RBC AUTO: 94 FL (ref 81–99)
MONOCYTES ABSOLUTE: 0.6 K/UL (ref 0–0.9)
MONOCYTES RELATIVE PERCENT: 5.7 % (ref 0–10)
NEUTROPHILS ABSOLUTE: 7.3 K/UL (ref 1.5–7.5)
NEUTROPHILS RELATIVE PERCENT: 76.1 % (ref 50–65)
NITRITE, URINE: NEGATIVE
PDW BLD-RTO: 13.9 % (ref 11.5–14.5)
PH UA: 5 (ref 5–8)
PLATELET # BLD: 227 K/UL (ref 130–400)
PMV BLD AUTO: 11.2 FL (ref 9.4–12.3)
POTASSIUM REFLEX MAGNESIUM: 3.7 MMOL/L (ref 3.5–5)
PROTEIN UA: NEGATIVE MG/DL
RBC # BLD: 3.97 M/UL (ref 4.2–5.4)
RBC UA: ABNORMAL /HPF (ref 0–2)
SODIUM BLD-SCNC: 140 MMOL/L (ref 136–145)
SPECIFIC GRAVITY UA: 1.03 (ref 1–1.03)
TOTAL PROTEIN: 6.9 G/DL (ref 6.6–8.7)
UROBILINOGEN, URINE: 1 E.U./DL
WBC # BLD: 9.6 K/UL (ref 4.8–10.8)
WBC UA: ABNORMAL /HPF (ref 0–5)

## 2022-07-27 PROCEDURE — 81001 URINALYSIS AUTO W/SCOPE: CPT

## 2022-07-27 PROCEDURE — 36415 COLL VENOUS BLD VENIPUNCTURE: CPT

## 2022-07-27 PROCEDURE — 80053 COMPREHEN METABOLIC PANEL: CPT

## 2022-07-27 PROCEDURE — 85025 COMPLETE CBC W/AUTO DIFF WBC: CPT

## 2022-07-27 PROCEDURE — 6360000002 HC RX W HCPCS: Performed by: EMERGENCY MEDICINE

## 2022-07-27 PROCEDURE — 99284 EMERGENCY DEPT VISIT MOD MDM: CPT

## 2022-07-27 PROCEDURE — 84703 CHORIONIC GONADOTROPIN ASSAY: CPT

## 2022-07-27 PROCEDURE — 96375 TX/PRO/DX INJ NEW DRUG ADDON: CPT

## 2022-07-27 PROCEDURE — 96374 THER/PROPH/DIAG INJ IV PUSH: CPT

## 2022-07-27 PROCEDURE — 74150 CT ABDOMEN W/O CONTRAST: CPT

## 2022-07-27 PROCEDURE — 2580000003 HC RX 258: Performed by: EMERGENCY MEDICINE

## 2022-07-27 PROCEDURE — 74176 CT ABD & PELVIS W/O CONTRAST: CPT | Performed by: RADIOLOGY

## 2022-07-27 RX ORDER — HYDROMORPHONE HYDROCHLORIDE 1 MG/ML
1 INJECTION, SOLUTION INTRAMUSCULAR; INTRAVENOUS; SUBCUTANEOUS ONCE
Status: COMPLETED | OUTPATIENT
Start: 2022-07-27 | End: 2022-07-27

## 2022-07-27 RX ORDER — 0.9 % SODIUM CHLORIDE 0.9 %
1000 INTRAVENOUS SOLUTION INTRAVENOUS ONCE
Status: COMPLETED | OUTPATIENT
Start: 2022-07-27 | End: 2022-07-27

## 2022-07-27 RX ORDER — ONDANSETRON 2 MG/ML
4 INJECTION INTRAMUSCULAR; INTRAVENOUS ONCE
Status: COMPLETED | OUTPATIENT
Start: 2022-07-27 | End: 2022-07-27

## 2022-07-27 RX ADMIN — SODIUM CHLORIDE 1000 ML: 9 INJECTION, SOLUTION INTRAVENOUS at 08:19

## 2022-07-27 RX ADMIN — HYDROMORPHONE HYDROCHLORIDE 1 MG: 1 INJECTION, SOLUTION INTRAMUSCULAR; INTRAVENOUS; SUBCUTANEOUS at 08:21

## 2022-07-27 RX ADMIN — ONDANSETRON 4 MG: 2 INJECTION INTRAMUSCULAR; INTRAVENOUS at 08:20

## 2022-07-27 ASSESSMENT — ENCOUNTER SYMPTOMS
RHINORRHEA: 0
SHORTNESS OF BREATH: 0
ABDOMINAL PAIN: 0
BACK PAIN: 0
DIARRHEA: 0
NAUSEA: 1
SORE THROAT: 0

## 2022-07-27 ASSESSMENT — PAIN SCALES - GENERAL: PAINLEVEL_OUTOF10: 8

## 2022-07-27 NOTE — ED PROVIDER NOTES
[facility] EMERGENCY DEPT  eMERGENCY dEPARTMENT eNCOUnter      Pt Name: Jorge Luis Bhatt  MRN: [id number]  [place] 1990  Date of evaluation: 7/27/2022  Provider: Donna Larson MD    [address]       Chief Complaint   Patient presents with    Flank Pain    Back Pain         HISTORY OF PRESENT ILLNESS   (Location/Symptom, Timing/Onset,Context/Setting, Quality, Duration, Modifying Factors, Severity)  Note limiting factors. Jorge Luis Bhatt is a 34 y.o. female who presents to the emergency department with right flank pain. Starts in back and radiates to front. Intermittent in nature. Severe cramping sensation. She states it feels like contractions from when she was pregnant. She has had a prior tubal.  She started her period today. She took some medication at home with no improvement in her pain. She has a history of kidney stones and ovarian cysts and pain is not similar. HPI    NursingNotes were reviewed. REVIEW OF SYSTEMS    (2-9 systems for level 4, 10 or more for level 5)     Review of Systems   Constitutional:  Negative for chills and fever. HENT:  Negative for rhinorrhea and sore throat. Respiratory:  Negative for shortness of breath. Cardiovascular:  Negative for chest pain and leg swelling. Gastrointestinal:  Positive for nausea. Negative for abdominal pain and diarrhea. Genitourinary:  Positive for flank pain. Negative for difficulty urinating. Musculoskeletal:  Negative for back pain and neck pain. Skin:  Negative for rash. Neurological:  Negative for weakness and headaches. Psychiatric/Behavioral:  Negative for confusion. A complete review of systems was performed and is negative except as noted above in the HPI.        PAST MEDICAL HISTORY     Past Medical History:   Diagnosis Date    Anxiety     Bipolar 2 disorder ([facility] [address] 01/25/2021    DDD (degenerative disc disease), lumbosacral 01/05/2022    Headache(784.0)     Headache, paroxysmal hemicrania, episodic     History of posttraumatic stress disorder (PTSD) 12/01/2020    Kidney stone     Migraine     Tension headache          SURGICAL HISTORY       Past Surgical History:   Procedure Laterality Date    ADENOIDECTOMY      CHOLECYSTECTOMY, LAPAROSCOPIC  11/05/2012    MYRINGOTOMY AND TYMPANOSTOMY TUBE PLACEMENT      TUBAL LIGATION           CURRENT MEDICATIONS       Previous Medications    AIMOVIG 140 MG/ML SOAJ    INJECT 140MG UNDER THE SKIN ONCE MONTHLY    BUPROPION (WELLBUTRIN XL) 150 MG EXTENDED RELEASE TABLET    TAKE 1 TABLET BY MOUTH ONE TIME A DAY IN THE MORNING    BUTALBITAL-APAP-CAFFEINE -40 MG CAPS PER CAPSULE    Take 1 capsule by mouth every 6 hours as needed for Headaches or Migraine    CYCLOBENZAPRINE (FLEXERIL) 5 MG TABLET    Take 1 tablet by mouth nightly as needed for Muscle spasms    FLUTICASONE (FLONASE) 50 MCG/ACT NASAL SPRAY    2 sprays by Each Nostril route in the morning and 2 sprays in the evening. LAMOTRIGINE (LAMICTAL) 150 MG TABLET    Take 1 tablet by mouth daily    MELOXICAM (MOBIC) 7.5 MG TABLET    Take 1 tablet by mouth daily    METHYLPREDNISOLONE (MEDROL, MICHELLE,) 4 MG TABLET    Take by mouth as directed    ONDANSETRON (ZOFRAN ODT) 4 MG DISINTEGRATING TABLET    Take 1 tablet by mouth every 8 hours as needed for Nausea or Vomiting    PSEUDOEPHEDRINE (DECONGESTANT) 30 MG TABLET    Take 1 tablet by mouth in the morning and 1 tablet at noon and 1 tablet before bedtime.     QUETIAPINE (SEROQUEL) 100 MG TABLET    Take 1 tablet by mouth nightly    TOPIRAMATE (TOPAMAX) 100 MG TABLET    TAKE 1 TABLET BY MOUTH TWO TIMES A DAY    TRANEXAMIC ACID (LYSTEDA) 650 MG TABS TABLET    Take 2 tablets by mouth 3 times daily       ALLERGIES     Morphine and Salonpas [camphor-menthol-methyl sal]    FAMILY HISTORY       Family History   Problem Relation Age of Onset    Kidney stones Mother     Hypertension Father     Cancer Maternal Grandmother         cervical    Heart Failure Maternal Grandmother     Diabetes Paternal Grandfather     High Blood Pressure Paternal Grandfather     Diabetes Maternal Aunt     Cancer Maternal Cousin         cervical    Cancer Maternal Cousin         cervical          SOCIAL HISTORY       Social History     Socioeconomic History    Marital status:      Spouse name: Janet Lopez    Number of children: 2    Years of education: 12    Highest education level: Some college, no degree   Occupational History    Occupation: Behavior[employer]      Comment: [employer]   Tobacco Use    Smoking status: Every Day     Packs/day: 1.00     Types: Cigarettes     Start date: 2005    Smokeless tobacco: Never   Vaping Use    Vaping Use: Never used   Substance and Sexual Activity    Alcohol use: Yes     Comment: rarely    Drug use: No    Sexual activity: Yes     Comment: tubal ligation   Social History Narrative    PREVIOUS MEDICATION TRIALS    Lamictal    Celexa (says it made her worse)    Trazodone        PREVIOUS PSYCHIATRIC HISTORY    She says that she has been treated for anxiety/depression off and on since age 20. FAMILY PSYCHIATRIC HISTORY    Father, PTSD (combat ), alcoholic, rages (has been sober for the last 6 yrs)    Paternal grandparents are alcoholics    Maternal grandfather, alcoholic    Brother, takes Paxil (doesn't know)    Son, ADHD, (possibly ODD, possibly Bipolar)    Two cousins, bipolar disorder    Mother, anxiety, depression, insomnia, mood swings        PREVIOUS SUICIDE ATTEMPTS: Once when she was teenager, [employer] year in high school, (cut herself). She says that she called a friend to come and help her clean it up and she didn't report it or say anything to anyone else about it. History of cutting (has not done this in a couple of years, says she has a wonderful  who helps her with this).         INPATIENT HOSPITALIZATIONS: denies        REHABILITATION:denies     Social Determinants of Health     Financial Resource Strain: Low Risk     Difficulty of Paying Living Expenses: Abdominal:      General: Bowel sounds are normal. There is no distension. Palpations: Abdomen is soft. Tenderness: There is abdominal tenderness. There is right CVA tenderness. Musculoskeletal:         General: Normal range of motion. Cervical back: Normal range of motion and neck supple. Skin:     General: Skin is warm and dry. Findings: No rash. Neurological:      Mental Status: She is alert and oriented to person, place, and time. Cranial Nerves: No cranial nerve deficit. Motor: No abnormal muscle tone. Coordination: Coordination normal.   Psychiatric:         Behavior: Behavior normal.       DIAGNOSTIC RESULTS     EKG: All EKG's are interpreted by the Emergency Department Physician who either signs or Co-signs this chart in the absence of a cardiologist.      RADIOLOGY:   Non-plain film images such as CT, Ultrasound and MRI are read by the radiologist. Plainradiographic images are visualized and preliminarily interpreted by the emergency physician with the below findings:        Interpretation per the Radiologist below, if available at the time of this note:    [address]   Final Result   1. Multiple bilateral non-obstructing renal stones measuring 1 mm to 3 mm. No ureteral calculi. No hydronephrosis. More stones are seen than on the prior. Recommendation: Follow up as clinically indicated. All CT scans at this facility utilize dose modulation, iterative reconstruction, and/or weight based dosing when appropriate to reduce radiation dose to as low as reasonably achievable.                         Electronically Signed by Nazanin Roe MD at 27-Jul-2022 10:23:11 AM                     ED BEDSIDE ULTRASOUND:   Performed by ED Physician - none    LABS:  Labs Reviewed   CBC WITH AUTO DIFFERENTIAL - Abnormal; Notable for the following components:       Result Value    RBC 3.97 (*)     MCH 31.2 (*)     Neutrophils % 76.1 (*)     Lymphocytes % 16.4 (*)     All other components within normal limits   COMPREHENSIVE METABOLIC PANEL W/ REFLEX TO MG FOR LOW K - Abnormal; Notable for the following components:    CO2 21 (*)     All other components within normal limits   URINALYSIS WITH REFLEX TO CULTURE - Abnormal; Notable for the following components:    Blood, Urine TRACE (*)     All other components within normal limits   MICROSCOPIC URINALYSIS - Abnormal; Notable for the following components:    Hyaline Casts, UA 6-10 (*)     WBC, UA 3-5 (*)     Bacteria, UA None Seen (*)     Crystals, UA 1+ Ca. Oxalate (*)     All other components within normal limits   HCG, SERUM, QUALITATIVE       All other labs were within normal range or not returned as of this dictation. EMERGENCY DEPARTMENT COURSE and DIFFERENTIALDIAGNOSIS/MDM:   Vitals:    Vitals:    07/27/22 0757   BP: 98/71   Pulse: 78   Resp: 16   Temp: 97.6 °F (36.4 °C)   TempSrc: Oral   SpO2: 99%   Weight: 142 lb (64.4 kg)   Height: 5' 5\" (1.651 m)       MDM  Pain controlled. Has stones, but none obstructing. UA does not look infected. Pt to [facility] with urology as needed, return for worsening symptoms      CONSULTS:  None    PROCEDURES:  Unless otherwise notedbelow, none     Procedures    FINAL IMPRESSION     1. Abdominal pain, unspecified abdominal location    2.  Kidney stone          DISPOSITION/PLAN   DISPOSITION Decision To Discharge 07/27/2022 09:52:01 AM      PATIENT REFERRED TO:  @FUP@    DISCHARGE MEDICATIONS:  New Prescriptions    No medications on file          (Please note that portions of this note were completed with a voice recognition program.  Efforts were made to edit the dictations butoccasionally words are mis-transcribed.)    Dominik Ray MD (electronically signed)  AttendingEmergency Physician         Dominik Ray MD  07/27/22 2779

## 2022-07-28 DIAGNOSIS — N20.0 KIDNEY STONE: Primary | ICD-10-CM

## 2022-07-29 ENCOUNTER — TELEPHONE (OUTPATIENT)
Dept: PSYCHOLOGY | Age: 32
End: 2022-07-29

## 2022-08-01 ENCOUNTER — OFFICE VISIT (OUTPATIENT)
Dept: PSYCHOLOGY | Age: 32
End: 2022-08-01
Payer: COMMERCIAL

## 2022-08-01 ENCOUNTER — TELEPHONE (OUTPATIENT)
Dept: PSYCHIATRY | Age: 32
End: 2022-08-01

## 2022-08-01 DIAGNOSIS — F31.81 BIPOLAR 2 DISORDER (HCC): Primary | ICD-10-CM

## 2022-08-01 PROCEDURE — 90834 PSYTX W PT 45 MINUTES: CPT | Performed by: SOCIAL WORKER

## 2022-08-01 ASSESSMENT — PATIENT HEALTH QUESTIONNAIRE - PHQ9
1. LITTLE INTEREST OR PLEASURE IN DOING THINGS: 2
SUM OF ALL RESPONSES TO PHQ QUESTIONS 1-9: 9
10. IF YOU CHECKED OFF ANY PROBLEMS, HOW DIFFICULT HAVE THESE PROBLEMS MADE IT FOR YOU TO DO YOUR WORK, TAKE CARE OF THINGS AT HOME, OR GET ALONG WITH OTHER PEOPLE: 0
5. POOR APPETITE OR OVEREATING: 0
SUM OF ALL RESPONSES TO PHQ9 QUESTIONS 1 & 2: 4
SUM OF ALL RESPONSES TO PHQ QUESTIONS 1-9: 10
8. MOVING OR SPEAKING SO SLOWLY THAT OTHER PEOPLE COULD HAVE NOTICED. OR THE OPPOSITE, BEING SO FIGETY OR RESTLESS THAT YOU HAVE BEEN MOVING AROUND A LOT MORE THAN USUAL: 0
7. TROUBLE CONCENTRATING ON THINGS, SUCH AS READING THE NEWSPAPER OR WATCHING TELEVISION: 1
SUM OF ALL RESPONSES TO PHQ QUESTIONS 1-9: 10
4. FEELING TIRED OR HAVING LITTLE ENERGY: 0
3. TROUBLE FALLING OR STAYING ASLEEP: 1
2. FEELING DOWN, DEPRESSED OR HOPELESS: 2
SUM OF ALL RESPONSES TO PHQ QUESTIONS 1-9: 10
9. THOUGHTS THAT YOU WOULD BE BETTER OFF DEAD, OR OF HURTING YOURSELF: 1
6. FEELING BAD ABOUT YOURSELF - OR THAT YOU ARE A FAILURE OR HAVE LET YOURSELF OR YOUR FAMILY DOWN: 3

## 2022-08-01 ASSESSMENT — ANXIETY QUESTIONNAIRES
7. FEELING AFRAID AS IF SOMETHING AWFUL MIGHT HAPPEN: 0-NOT AT ALL
3. WORRYING TOO MUCH ABOUT DIFFERENT THINGS: 1-SEVERAL DAYS
1. FEELING NERVOUS, ANXIOUS, OR ON EDGE: 3
6. BECOMING EASILY ANNOYED OR IRRITABLE: 0-NOT AT ALL
GAD7 TOTAL SCORE: 11
5. BEING SO RESTLESS THAT IT IS HARD TO SIT STILL: 2-OVER HALF THE DAYS
2. NOT BEING ABLE TO STOP OR CONTROL WORRYING: 2-OVER HALF THE DAYS
4. TROUBLE RELAXING: 3-NEARLY EVERY DAY

## 2022-08-01 ASSESSMENT — COLUMBIA-SUICIDE SEVERITY RATING SCALE - C-SSRS
6. HAVE YOU EVER DONE ANYTHING, STARTED TO DO ANYTHING, OR PREPARED TO DO ANYTHING TO END YOUR LIFE?: NO
2. HAVE YOU ACTUALLY HAD ANY THOUGHTS OF KILLING YOURSELF?: NO
1. WITHIN THE PAST MONTH, HAVE YOU WISHED YOU WERE DEAD OR WISHED YOU COULD GO TO SLEEP AND NOT WAKE UP?: YES

## 2022-08-01 NOTE — TELEPHONE ENCOUNTER
Called pt for appointment reminder.     -Pt confirmed      Electronically signed by Nighat Salazar MA on 8/1/2022 at 11:20 AM
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
## 2022-08-02 ENCOUNTER — OFFICE VISIT (OUTPATIENT)
Dept: PSYCHIATRY | Age: 32
End: 2022-08-02
Payer: COMMERCIAL

## 2022-08-02 VITALS
HEIGHT: 65 IN | SYSTOLIC BLOOD PRESSURE: 103 MMHG | OXYGEN SATURATION: 99 % | HEART RATE: 79 BPM | TEMPERATURE: 97.3 F | BODY MASS INDEX: 23.26 KG/M2 | WEIGHT: 139.6 LBS | DIASTOLIC BLOOD PRESSURE: 61 MMHG

## 2022-08-02 DIAGNOSIS — F51.05 INSOMNIA DUE TO OTHER MENTAL DISORDER: ICD-10-CM

## 2022-08-02 DIAGNOSIS — F41.0 GENERALIZED ANXIETY DISORDER WITH PANIC ATTACKS: ICD-10-CM

## 2022-08-02 DIAGNOSIS — F99 INSOMNIA DUE TO OTHER MENTAL DISORDER: ICD-10-CM

## 2022-08-02 DIAGNOSIS — F43.12 CHRONIC POST-TRAUMATIC STRESS DISORDER (PTSD): ICD-10-CM

## 2022-08-02 DIAGNOSIS — F41.1 GENERALIZED ANXIETY DISORDER WITH PANIC ATTACKS: ICD-10-CM

## 2022-08-02 DIAGNOSIS — F31.81 BIPOLAR II DISORDER, MILD, DEPRESSED, WITH ANXIOUS DISTRESS (HCC): Primary | ICD-10-CM

## 2022-08-02 PROCEDURE — 99214 OFFICE O/P EST MOD 30 MIN: CPT | Performed by: PSYCHIATRY & NEUROLOGY

## 2022-08-02 RX ORDER — CLONAZEPAM 0.5 MG/1
0.5 TABLET ORAL 2 TIMES DAILY PRN
Qty: 60 TABLET | Refills: 1 | Status: SHIPPED | OUTPATIENT
Start: 2022-08-02 | End: 2022-09-21

## 2022-08-02 RX ORDER — QUETIAPINE FUMARATE 100 MG/1
150 TABLET, FILM COATED ORAL NIGHTLY
Qty: 45 TABLET | Refills: 3 | Status: SHIPPED | OUTPATIENT
Start: 2022-08-02 | End: 2022-09-21

## 2022-08-02 ASSESSMENT — PATIENT HEALTH QUESTIONNAIRE - PHQ9
SUM OF ALL RESPONSES TO PHQ9 QUESTIONS 1 & 2: 4
8. MOVING OR SPEAKING SO SLOWLY THAT OTHER PEOPLE COULD HAVE NOTICED. OR THE OPPOSITE, BEING SO FIGETY OR RESTLESS THAT YOU HAVE BEEN MOVING AROUND A LOT MORE THAN USUAL: 1
9. THOUGHTS THAT YOU WOULD BE BETTER OFF DEAD, OR OF HURTING YOURSELF: 2
SUM OF ALL RESPONSES TO PHQ QUESTIONS 1-9: 16
4. FEELING TIRED OR HAVING LITTLE ENERGY: 2
2. FEELING DOWN, DEPRESSED OR HOPELESS: 3
1. LITTLE INTEREST OR PLEASURE IN DOING THINGS: 1
SUM OF ALL RESPONSES TO PHQ QUESTIONS 1-9: 14
SUM OF ALL RESPONSES TO PHQ QUESTIONS 1-9: 16
SUM OF ALL RESPONSES TO PHQ QUESTIONS 1-9: 16
3. TROUBLE FALLING OR STAYING ASLEEP: 2
5. POOR APPETITE OR OVEREATING: 1
10. IF YOU CHECKED OFF ANY PROBLEMS, HOW DIFFICULT HAVE THESE PROBLEMS MADE IT FOR YOU TO DO YOUR WORK, TAKE CARE OF THINGS AT HOME, OR GET ALONG WITH OTHER PEOPLE: 1
7. TROUBLE CONCENTRATING ON THINGS, SUCH AS READING THE NEWSPAPER OR WATCHING TELEVISION: 1
6. FEELING BAD ABOUT YOURSELF - OR THAT YOU ARE A FAILURE OR HAVE LET YOURSELF OR YOUR FAMILY DOWN: 3

## 2022-08-02 ASSESSMENT — COLUMBIA-SUICIDE SEVERITY RATING SCALE - C-SSRS
2. HAVE YOU ACTUALLY HAD ANY THOUGHTS OF KILLING YOURSELF?: NO
1. WITHIN THE PAST MONTH, HAVE YOU WISHED YOU WERE DEAD OR WISHED YOU COULD GO TO SLEEP AND NOT WAKE UP?: NO
6. HAVE YOU EVER DONE ANYTHING, STARTED TO DO ANYTHING, OR PREPARED TO DO ANYTHING TO END YOUR LIFE?: NO

## 2022-08-02 NOTE — PROGRESS NOTES
8/2/2022 12:09 PM   Progress Note        Milli Spann 1990      Chief Complaint   Patient presents with    Medication Check           Subjective:    68-year-old white female with history of bipolar disorder, anxiety, panic attacks, presents for follow-up. Currently kept on Lamictal, Wellbutrin, Seroquel. Patient reports compliance. No SEs. Tearful. Chuck Human depressed and anxious. Denies SI.  left for [address] and she had to clean up their rental house after him. Worried about children. Having renal stones. Not sleeping. Good family support. Asking for med adjustment. Discussed SEs of Topamax including renal stones. Current Substance Use: Never had any issues with a stimulant, opioid or benzodiazepine abuse. Drank too much in the past.  Currently avoiding alcohol. Smokes cigarettes.      BP: /61   Pulse 79   Temp 97.3 °F (36.3 °C)   Ht 5' 5\" (1.651 m)   Wt 139 lb 9.6 oz (63.3 kg)   SpO2 99%   BMI 23.23 kg/m²       Review of Systems - 14 point review:  All negative:     Constitutional: (fevers, chills, night sweats, wt loss/gain, change in appetite, fatigue, somnolence)    HEENT: (ear pain or discharge, hearing loss, ear ringing, sinus pressure, nosebleed, nasal discharge, sore throat, oral sores, tooth pain, bleeding gums, hoarse voice, neck pain)      Cardiovascular: (HTN, chest pain, elevated cholesterol/lipids, palpitations, leg swelling, leg pain with walking)    Respiratory: (cough, wheezing, snoring, SOB with activity (dyspnea), SOB while lying flat (orthopnea), awakening with severe SOB (paroxysmal nocturnal dyspnea))    Gastrointestinal: (NVD, constipation, abdominal pain, bright red stools, black tarry stools, stool incontinence)     Genitourinary:  (pelvic pain, burning or frequency of urination, urinary urgency, blood in urine incomplete bladder emptying, urinary incontinence, STD; MEN: testicular pain or swelling, erectile dysfunction; WOMEN: LMP, heavy menstrual bleeding (menorrhagia), irregular periods, postmenopausal bleeding, menstrual pain (dymenorrhea, vaginal discharge)    Musculoskeletal: (bone pain/fracture, joint pain or swelling, musle pain)    Integumentary: (rashes, acne, non-healing sores, itching, breast lumps, breast pain, nipple discharge, hair loss)    Neurologic: (HA, muscle weakness, paresthesias (numbness, coldness, crawling or prickling), memory loss, seizure, dizziness)    Endocrine: (heat or cold intolerance, excessive thirst (polydipsia), excessive hunger (polyphagia))    Immune/Allergic: (hives, seasonal or environmental allergies, HIV exposure)    Hematologic/Lymphatic: (lymph node enlargement, easy bleeding or bruising)    History obtained via chart review and patient    PCP is Enrike Padron MD       Current Meds:    Prior to Admission medications    Medication Sig Start Date End Date Taking? Authorizing Provider   QUEtiapine (SEROQUEL) 100 MG tablet Take 1.5 tablets by mouth nightly 8/2/22 9/1/22 Yes Katherine Patel MD   clonazePAM (KLONOPIN) 0.5 MG tablet Take 1 tablet by mouth 2 times daily as needed for Anxiety for up to 30 days. 8/2/22 9/1/22 Yes Katherine Patel MD   methylPREDNISolone (MEDROL, MICHELLE,) 4 MG tablet Take by mouth as directed 7/20/22   Víctor Worthy PA-C   pseudoephedrine (DECONGESTANT) 30 MG tablet Take 1 tablet by mouth in the morning and 1 tablet at noon and 1 tablet before bedtime.  7/20/22   Víctor Worthy PA-C   fluticasone (FLONASE) 50 MCG/ACT nasal spray 2 sprays by Each Nostril route in the morning and 2 sprays in the evening. 7/20/22   Víctor Worthy PA-C   ondansetron (ZOFRAN ODT) 4 MG disintegrating tablet Take 1 tablet by mouth every 8 hours as needed for Nausea or Vomiting 6/20/22   Val Woody PA-C   meloxicam (MOBIC) 7.5 MG tablet Take 1 tablet by mouth daily 6/1/22   Jonas Romano MD   tranexamic acid (LYSTEDA) 650 MG TABS tablet Take 2 tablets by mouth 3 times daily 5/31/22   CHRIS Hudson butalbital-APAP-caffeine -40 MG CAPS per capsule Take 1 capsule by mouth every 6 hours as needed for Headaches or Migraine 5/31/22 9/28/22  Alfa Lofton MD   topiramate (TOPAMAX) 100 MG tablet TAKE 1 TABLET BY MOUTH TWO TIMES A DAY 4/26/22   Alfa Lofton MD   cyclobenzaprine (FLEXERIL) 5 MG tablet Take 1 tablet by mouth nightly as needed for Muscle spasms  Patient not taking: No sig reported 4/25/22   Jorge Davenport MD   AIMOVIG 140 MG/ML SOAJ INJECT 140MG UNDER THE SKIN ONCE MONTHLY 3/29/22   Alfa Lofton MD   buPROPion (WELLBUTRIN XL) 150 MG extended release tablet TAKE 1 TABLET BY MOUTH ONE TIME A DAY IN THE MORNING 6/1/20   CHRIS Vital NP   lamoTRIgine (LAMICTAL) 150 MG tablet Take 1 tablet by mouth daily  Patient taking differently: Take 200 mg by mouth in the morning. 6/1/20   CHRIS Vital NP       MSE:  Appearance: Appropriately groomed. Made good eye contact. Behavior: Tearful  Speech: Normal in tone, volume, and quality. No slurring, dysarthria or pressured speech noted. Mood: \"Not good\"   Affect: Mood congruent. Thought Process: Appears linear, logical and goal oriented. Causality appears intact. Thought Content: Denies active suicidal and homicidal ideations. No overt delusions or paranoia appreciated. Perceptions: Denies auditory or visual hallucinations at present time. Not responding to internal stimuli. Concentration: Intact. Orientation: to person, place, date, and situation. Language: Intact. Fund of information: Intact. Memory: Recent and remote appear intact. Impulsivity: Limited. Neurovegitative: Poor appetite and sleep. Insight: Fair. Judgment: Fair.       Lab Results   Component Value Date     07/27/2022    K 3.7 07/27/2022     07/27/2022    CO2 21 (L) 07/27/2022    BUN 14 07/27/2022    CREATININE 0.6 07/27/2022    GLUCOSE 99 07/27/2022    CALCIUM 9.1 07/27/2022    PROT 6.9 07/27/2022    LABALBU 4.4 07/27/2022    BILITOT <0.2 07/27/2022    ALKPHOS 73 07/27/2022    AST 11 07/27/2022    ALT 8 07/27/2022    LABGLOM >60 07/27/2022    GFRAA >59 07/27/2022     Lab Results   Component Value Date/Time     07/27/2022 08:16 AM    K 3.7 07/27/2022 08:16 AM     07/27/2022 08:16 AM    CO2 21 07/27/2022 08:16 AM    BUN 14 07/27/2022 08:16 AM    CREATININE 0.6 07/27/2022 08:16 AM    GLUCOSE 99 07/27/2022 08:16 AM    CALCIUM 9.1 07/27/2022 08:16 AM      No results found for: CHOL  No results found for: TRIG  No results found for: HDL  No results found for: LDLCHOLESTEROL, LDLCALC  No results found for: LABVLDL, VLDL  No results found for: CHOLHDLRATIO  No results found for: LABA1C  No results found for: EAG  No results found for: TSHFT4, TSH  No results found for: VITD25    Assessments Administered:      Assessment:   1. Bipolar II disorder, mild, depressed, with anxious distress ([facility] [address]    2. Generalized anxiety disorder with panic attacks    3. Chronic post-traumatic stress disorder (PTSD)    4. Insomnia due to other mental disorder           No evidence of acute suicidality, homicidality or psychosis observed today. Psychiatrically stable. Plan:  1. Increase Seroquel to help with sleep. Add PRN Klonopin for anxiety. The risks, benefits, side effects, indications, contraindications, and adverse effects of the medications have been discussed. Yes.  2. The pt has verbalized understanding and has capacity to give informed consent. 3. The Mariah Friday report has been reviewed according to [facility] regulations. 4. Supportive therapy offered. Patient will continue seeing her therapist.  5. Follow up: Return in about 2 months (around 10/2/2022). 6. The patient has been advised to call with any problems. 7. Controlled substance Treatment Plan: NA  8.  The above listed medications have been continued, modifications in meds and other orders/labs as follows:      Orders Placed This Encounter   Medications    QUEtiapine (SEROQUEL) 100 MG tablet     Sig: Take 1.5 tablets by mouth nightly     Dispense:  45 tablet     Refill:  3    clonazePAM (KLONOPIN) 0.5 MG tablet     Sig: Take 1 tablet by mouth 2 times daily as needed for Anxiety for up to 30 days. Dispense:  60 tablet     Refill:  1        No orders of the defined types were placed in this encounter. 9. Additional comments: Consider checking thyroid function and vitamin B12 and D levels       10. Over 50% of the total visit time of 28  minutes was spent on counseling and/or coordination of care of:                        1. Bipolar II disorder, mild, depressed, with anxious distress ([facility] [address]    2. Generalized anxiety disorder with panic attacks    3. Chronic post-traumatic stress disorder (PTSD)    4.  Insomnia due to other mental disorder          Migue Flores MD

## 2022-08-04 RX ORDER — BUPROPION HYDROCHLORIDE 150 MG/1
TABLET ORAL
Qty: 90 TABLET | Refills: 3 | Status: SHIPPED | OUTPATIENT
Start: 2022-08-04

## 2022-08-04 RX ORDER — LAMOTRIGINE 200 MG/1
200 TABLET ORAL DAILY
Qty: 90 TABLET | Refills: 3 | Status: SHIPPED | OUTPATIENT
Start: 2022-08-04 | End: 2022-11-02

## 2022-08-08 ENCOUNTER — OFFICE VISIT (OUTPATIENT)
Dept: ENT CLINIC | Age: 32
End: 2022-08-08
Payer: COMMERCIAL

## 2022-08-08 VITALS
SYSTOLIC BLOOD PRESSURE: 116 MMHG | DIASTOLIC BLOOD PRESSURE: 68 MMHG | BODY MASS INDEX: 24.32 KG/M2 | WEIGHT: 146 LBS | HEIGHT: 65 IN

## 2022-08-08 DIAGNOSIS — H69.82 EUSTACHIAN TUBE DYSFUNCTION, LEFT: Primary | ICD-10-CM

## 2022-08-08 PROCEDURE — 99213 OFFICE O/P EST LOW 20 MIN: CPT | Performed by: PHYSICIAN ASSISTANT

## 2022-08-08 NOTE — PROGRESS NOTES
Ms. Alex Keller is a 65-year-old female that presents for 2-week follow-up after treatment for eustachian tube dysfunction of the left ear with questionable TMJ to the left side. Patient reports that she is still noticing some popping sounds to the left ear but denies any muffled hearing. Physical examination demonstrated the patient to have no evidence of air-fluid levels bilaterally. She was noted with normal mobility to pneumonic exam.  Patient was noted to have no remarkable tenderness to palpation of the left TMJ region even I suspect this may be etiology of her pain currently. Neck exam demonstrated no lymphadenopathy or thyromegaly. Oral exam demonstrated no abnormalities to the posterior pharynx with poor dentition. Impression: Clinically resolving eustachian tube dysfunction of the left ear    Plan: I advised the patient to continue the Sudafed and Flonase twice a day for another 2 weeks. She is to follow-up in 2 weeks for reevaluation. If she is noted to have recurring discomfort we will treat her TMJ due to the high suspicion.       Electronically signed by Nando Pate PA-C on 8/8/22 at 11:05 AM SUSANA

## 2022-08-10 DIAGNOSIS — M26.643 ARTHRITIS OF BOTH TEMPOROMANDIBULAR JOINTS: ICD-10-CM

## 2022-08-10 DIAGNOSIS — M51.37 DDD (DEGENERATIVE DISC DISEASE), LUMBOSACRAL: ICD-10-CM

## 2022-08-10 RX ORDER — CYCLOBENZAPRINE HCL 5 MG
5 TABLET ORAL NIGHTLY PRN
Qty: 30 TABLET | Refills: 1 | Status: SHIPPED | OUTPATIENT
Start: 2022-08-10 | End: 2022-09-21

## 2022-08-19 DIAGNOSIS — G44.221 CHRONIC TENSION-TYPE HEADACHE, INTRACTABLE: ICD-10-CM

## 2022-08-19 DIAGNOSIS — G43.019 INTRACTABLE MIGRAINE WITHOUT AURA AND WITHOUT STATUS MIGRAINOSUS: ICD-10-CM

## 2022-08-19 RX ORDER — BUTALBITAL, ACETAMINOPHEN AND CAFFEINE 300; 40; 50 MG/1; MG/1; MG/1
1 CAPSULE ORAL EVERY 6 HOURS PRN
Qty: 40 CAPSULE | Refills: 1 | Status: SHIPPED | OUTPATIENT
Start: 2022-08-19 | End: 2022-12-17

## 2022-08-19 NOTE — TELEPHONE ENCOUNTER
Requested Prescriptions     Pending Prescriptions Disp Refills    butalbital-APAP-caffeine -40 MG CAPS per capsule 40 capsule 1     Sig: Take 1 capsule by mouth every 6 hours as needed for Headaches or Migraine       Last Office Visit:  12/28/2021  Next Office Visit:  12/29/2022  Last Medication Refill:  5/31/22 with 1 RF  Tamy Ramos up to date:  8/1/22    *RX updated to reflect   8/19/22  fill date*

## 2022-08-22 ENCOUNTER — OFFICE VISIT (OUTPATIENT)
Dept: ENT CLINIC | Age: 32
End: 2022-08-22
Payer: COMMERCIAL

## 2022-08-22 ENCOUNTER — OFFICE VISIT (OUTPATIENT)
Dept: UROLOGY | Age: 32
End: 2022-08-22
Payer: COMMERCIAL

## 2022-08-22 ENCOUNTER — HOSPITAL ENCOUNTER (OUTPATIENT)
Dept: GENERAL RADIOLOGY | Age: 32
Discharge: HOME | End: 2022-08-22
Payer: COMMERCIAL

## 2022-08-22 VITALS — BODY MASS INDEX: 24.49 KG/M2 | WEIGHT: 147 LBS | TEMPERATURE: 98.3 F | HEIGHT: 65 IN

## 2022-08-22 VITALS
DIASTOLIC BLOOD PRESSURE: 82 MMHG | BODY MASS INDEX: 24.32 KG/M2 | HEIGHT: 65 IN | WEIGHT: 146 LBS | SYSTOLIC BLOOD PRESSURE: 136 MMHG

## 2022-08-22 DIAGNOSIS — H69.82 EUSTACHIAN TUBE DYSFUNCTION, LEFT: ICD-10-CM

## 2022-08-22 DIAGNOSIS — M26.643 ARTHRITIS OF BOTH TEMPOROMANDIBULAR JOINTS: Primary | ICD-10-CM

## 2022-08-22 DIAGNOSIS — N20.0 BILATERAL NEPHROLITHIASIS: Primary | ICD-10-CM

## 2022-08-22 DIAGNOSIS — N20.0 KIDNEY STONE: ICD-10-CM

## 2022-08-22 PROCEDURE — 99204 OFFICE O/P NEW MOD 45 MIN: CPT | Performed by: NURSE PRACTITIONER

## 2022-08-22 PROCEDURE — 99213 OFFICE O/P EST LOW 20 MIN: CPT | Performed by: PHYSICIAN ASSISTANT

## 2022-08-22 PROCEDURE — 74018 RADEX ABDOMEN 1 VIEW: CPT

## 2022-08-22 PROCEDURE — 74018 RADEX ABDOMEN 1 VIEW: CPT | Performed by: RADIOLOGY

## 2022-08-22 ASSESSMENT — ENCOUNTER SYMPTOMS
ABDOMINAL PAIN: 0
VOMITING: 0
NAUSEA: 0
ABDOMINAL DISTENTION: 0
BACK PAIN: 1

## 2022-08-22 NOTE — PROGRESS NOTES
David Pearce is a 34 y.o., female, New patient who presents today   Chief Complaint   Patient presents with    New Patient     I am here today for kidney stone, my KUB is done        HPI   Patient presents for evaluation after being seen in the emergency room on 7/27/2022 with complaints of right-sided flank pain. She does have a prior history of nephrolithiasis with her first stone episode being about 4 years ago. She reports she has passed about 4 to 5 stones spontaneously and has never required surgery in the past.  She has a strong family history of nephrolithiasis, especially in her mother's side of the family. She reports her mother has undergone several surgeries and even has CKD from complications of the stone. CT evaluation that night in the emergency room revealed multiple small right renal stones and 1-2 left-sided punctate renal stones. There were no stones visualized along the course of the ureters. She is chronically maintained on Topamax for history of chronic headaches. Today, she is complaining of pain mostly in the right flank area. She reports she does have chronic back issues and this feels less like stone pain and more as if she slept wrong or is having muscle issues. She denies any other significant urologic issues such as chronic urinary symptoms, UTIs, etc    REVIEW OF SYSTEMS:  Review of Systems   Constitutional:  Negative for chills and fever. Gastrointestinal:  Negative for abdominal distention, abdominal pain, nausea and vomiting. Genitourinary:  Positive for flank pain. Negative for difficulty urinating, dysuria, frequency, hematuria and urgency. Musculoskeletal:  Positive for back pain. Negative for gait problem. Psychiatric/Behavioral:  Negative for agitation and confusion. PHYSICAL EXAM:  Temp 98.3 °F (36.8 °C) (Temporal)   Ht 5' 5\" (1.651 m)   Wt 147 lb (66.7 kg)   BMI 24.46 kg/m²   Physical Exam  Vitals and nursing note reviewed.    Constitutional: General: She is not in acute distress. Appearance: Normal appearance. She is not ill-appearing. Pulmonary:      Effort: Pulmonary effort is normal. No respiratory distress. Abdominal:      General: There is no distension. Tenderness: There is no abdominal tenderness. There is no right CVA tenderness or left CVA tenderness. Neurological:      Mental Status: She is alert and oriented to person, place, and time. Mental status is at baseline. Psychiatric:         Mood and Affect: Mood normal.         Behavior: Behavior normal.     IMAGING:  Impression   1. Multiple bilateral non-obstructing renal stones measuring 1 mm to 3 mm. No ureteral calculi. No hydronephrosis. More stones are seen than on the prior. Recommendation: Follow up as clinically indicated. All CT scans at this facility utilize dose modulation, iterative reconstruction, and/or weight based dosing when appropriate to reduce radiation dose to as low as reasonably achievable. Electronically Signed by Nazanin Roe MD at 27-Jul-2022 10:23:11 AM         KUB today does not demonstrate any obvious renal stones. ASSESSMENT/PLAN  1. Bilateral nephrolithiasis  Patient with bilateral nephrolithiasis. The stones are small and patient would likely be able to pass them on her own if they left the kidney. We will plan for follow-up in about 6 months to determine if she would be a good candidate for ESWL. She is uncertain at this time if she would actually like to pursue ESWL in the future. - XR ABDOMEN (KUB) (SINGLE AP VIEW); Future      Orders Placed This Encounter   Procedures    XR ABDOMEN (KUB) (SINGLE AP VIEW)     Standing Status:   Future     Standing Expiration Date:   8/22/2023        Return in about 6 months (around 2/22/2023) for KUB prior. An electronic signature was used to authenticate this note.     TOMA CARRION, APRN - CNP    All information inputted into the note by the MA to include chief complaint, past medical history, past surgical history, medications, allergies, social and family history and review of systems has been reviewed and updated as needed by me. EMR Dragon/transcription disclaimer: Much of this document is electronic transcription/translation of spoken language to printed text. The electronic translation of spoken language may be erroneous or, at times, nonsensical words or phrases may be inadvertently transcribed.  Although I have reviewed the document for such errors, some may still exist.

## 2022-08-22 NOTE — PROGRESS NOTES
Mrs. Gilberto Douglass is a pleasant 74-year-old  female that presents for a 2-week follow-up after treatment for bilateral TMJ arthralgia with questionable eustachian tube dysfunction of the left ear. Patient reports that both feel back to baseline with no pain or problems. She reports of normal hearing from both ears. Physical examination revealed the patient to have normal TMs and canals bilaterally with normal mobility. Patient was noted with no tenderness to palpation of the TMJ region. There was no click or evidence of dislocation. Exam demonstrated the tongue to be midline with the posterior pharynx noted to be normal.  The neck demonstrated no lymphadenopathy. Impression: Improved TMJ arthralgia, clinically resolved left-sided eustachian tube dysfunction    Plan: Patient is to follow-up with me as needed. She was reminded to call if she has any questions or problems.       Electronically signed by Brisa Ornelas PA-C on 8/22/22 at 1:59 PM CDT

## 2022-09-20 ENCOUNTER — TELEPHONE (OUTPATIENT)
Dept: PSYCHIATRY | Age: 32
End: 2022-09-20

## 2022-09-20 NOTE — TELEPHONE ENCOUNTER
Called pt for appointment reminder.     -Pt confirmed      Electronically signed by Skyler Cancino MA on 9/20/2022 at 10:50 AM

## 2022-09-21 ENCOUNTER — OFFICE VISIT (OUTPATIENT)
Dept: FAMILY MEDICINE CLINIC | Age: 32
End: 2022-09-21
Payer: COMMERCIAL

## 2022-09-21 ENCOUNTER — OFFICE VISIT (OUTPATIENT)
Dept: PSYCHIATRY | Age: 32
End: 2022-09-21
Payer: COMMERCIAL

## 2022-09-21 VITALS
SYSTOLIC BLOOD PRESSURE: 88 MMHG | TEMPERATURE: 97.9 F | OXYGEN SATURATION: 98 % | WEIGHT: 140.38 LBS | DIASTOLIC BLOOD PRESSURE: 60 MMHG | HEIGHT: 65 IN | HEART RATE: 92 BPM | BODY MASS INDEX: 23.39 KG/M2

## 2022-09-21 VITALS
DIASTOLIC BLOOD PRESSURE: 67 MMHG | HEIGHT: 65 IN | WEIGHT: 139.6 LBS | BODY MASS INDEX: 23.26 KG/M2 | TEMPERATURE: 97.8 F | OXYGEN SATURATION: 100 % | HEART RATE: 86 BPM | SYSTOLIC BLOOD PRESSURE: 94 MMHG

## 2022-09-21 DIAGNOSIS — F51.05 INSOMNIA DUE TO OTHER MENTAL DISORDER: ICD-10-CM

## 2022-09-21 DIAGNOSIS — M26.643 ARTHRITIS OF BOTH TEMPOROMANDIBULAR JOINTS: ICD-10-CM

## 2022-09-21 DIAGNOSIS — M51.37 DDD (DEGENERATIVE DISC DISEASE), LUMBOSACRAL: Primary | ICD-10-CM

## 2022-09-21 DIAGNOSIS — F41.0 GENERALIZED ANXIETY DISORDER WITH PANIC ATTACKS: ICD-10-CM

## 2022-09-21 DIAGNOSIS — F43.12 CHRONIC POST-TRAUMATIC STRESS DISORDER (PTSD): ICD-10-CM

## 2022-09-21 DIAGNOSIS — F31.81 BIPOLAR II DISORDER, MILD, DEPRESSED, WITH ANXIOUS DISTRESS (HCC): Primary | ICD-10-CM

## 2022-09-21 DIAGNOSIS — F41.1 GENERALIZED ANXIETY DISORDER WITH PANIC ATTACKS: ICD-10-CM

## 2022-09-21 DIAGNOSIS — F99 INSOMNIA DUE TO OTHER MENTAL DISORDER: ICD-10-CM

## 2022-09-21 DIAGNOSIS — G44.221 CHRONIC TENSION-TYPE HEADACHE, INTRACTABLE: ICD-10-CM

## 2022-09-21 PROCEDURE — 99214 OFFICE O/P EST MOD 30 MIN: CPT | Performed by: FAMILY MEDICINE

## 2022-09-21 PROCEDURE — 99214 OFFICE O/P EST MOD 30 MIN: CPT | Performed by: PSYCHIATRY & NEUROLOGY

## 2022-09-21 RX ORDER — DICLOFENAC SODIUM 75 MG/1
75 TABLET, DELAYED RELEASE ORAL 2 TIMES DAILY PRN
Qty: 60 TABLET | Refills: 2 | Status: SHIPPED | OUTPATIENT
Start: 2022-09-21

## 2022-09-21 ASSESSMENT — PATIENT HEALTH QUESTIONNAIRE - PHQ9
6. FEELING BAD ABOUT YOURSELF - OR THAT YOU ARE A FAILURE OR HAVE LET YOURSELF OR YOUR FAMILY DOWN: 1
SUM OF ALL RESPONSES TO PHQ QUESTIONS 1-9: 5
3. TROUBLE FALLING OR STAYING ASLEEP: 1
9. THOUGHTS THAT YOU WOULD BE BETTER OFF DEAD, OR OF HURTING YOURSELF: 0
SUM OF ALL RESPONSES TO PHQ QUESTIONS 1-9: 5
7. TROUBLE CONCENTRATING ON THINGS, SUCH AS READING THE NEWSPAPER OR WATCHING TELEVISION: 0
1. LITTLE INTEREST OR PLEASURE IN DOING THINGS: 1
SUM OF ALL RESPONSES TO PHQ QUESTIONS 1-9: 5
8. MOVING OR SPEAKING SO SLOWLY THAT OTHER PEOPLE COULD HAVE NOTICED. OR THE OPPOSITE, BEING SO FIGETY OR RESTLESS THAT YOU HAVE BEEN MOVING AROUND A LOT MORE THAN USUAL: 0
10. IF YOU CHECKED OFF ANY PROBLEMS, HOW DIFFICULT HAVE THESE PROBLEMS MADE IT FOR YOU TO DO YOUR WORK, TAKE CARE OF THINGS AT HOME, OR GET ALONG WITH OTHER PEOPLE: 0
SUM OF ALL RESPONSES TO PHQ QUESTIONS 1-9: 5
5. POOR APPETITE OR OVEREATING: 0
SUM OF ALL RESPONSES TO PHQ9 QUESTIONS 1 & 2: 2
4. FEELING TIRED OR HAVING LITTLE ENERGY: 1
2. FEELING DOWN, DEPRESSED OR HOPELESS: 1
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
## 2022-09-21 NOTE — PROGRESS NOTES
9/21/2022 1:07 PM   Progress Note        Carlos Better 1990      Chief Complaint   Patient presents with    Medication Check             Subjective:    39-year-old white female with history of bipolar disorder, anxiety, panic attacks, presents for follow-up. Currently kept on Lamictal, Wellbutrin, Seroquel - increased last time. Added Klonopin last time. Patient reports compliance. No SEs. Bright affect today. Smiling. Denies SI. Sleeping well. Good family support. Going on vacation soon. Current Substance Use: Never had any issues with a stimulant, opioid or benzodiazepine abuse. Drank too much in the past.  Currently avoiding alcohol. Smokes cigarettes.      BP: BP 94/67   Pulse 86   Temp 97.8 °F (36.6 °C)   Ht 5' 5\" (1.651 m)   Wt 139 lb 9.6 oz (63.3 kg)   SpO2 100%   BMI 23.23 kg/m²       Review of Systems - 14 point review:  All negative:     Constitutional: (fevers, chills, night sweats, wt loss/gain, change in appetite, fatigue, somnolence)    HEENT: (ear pain or discharge, hearing loss, ear ringing, sinus pressure, nosebleed, nasal discharge, sore throat, oral sores, tooth pain, bleeding gums, hoarse voice, neck pain)      Cardiovascular: (HTN, chest pain, elevated cholesterol/lipids, palpitations, leg swelling, leg pain with walking)    Respiratory: (cough, wheezing, snoring, SOB with activity (dyspnea), SOB while lying flat (orthopnea), awakening with severe SOB (paroxysmal nocturnal dyspnea))    Gastrointestinal: (NVD, constipation, abdominal pain, bright red stools, black tarry stools, stool incontinence)     Genitourinary:  (pelvic pain, burning or frequency of urination, urinary urgency, blood in urine incomplete bladder emptying, urinary incontinence, STD; MEN: testicular pain or swelling, erectile dysfunction; WOMEN: LMP, heavy menstrual bleeding (menorrhagia), irregular periods, postmenopausal bleeding, menstrual pain (dymenorrhea, vaginal discharge)    Musculoskeletal: (bone pain/fracture, joint pain or swelling, musle pain)    Integumentary: (rashes, acne, non-healing sores, itching, breast lumps, breast pain, nipple discharge, hair loss)    Neurologic: (HA, muscle weakness, paresthesias (numbness, coldness, crawling or prickling), memory loss, seizure, dizziness)    Endocrine: (heat or cold intolerance, excessive thirst (polydipsia), excessive hunger (polyphagia))    Immune/Allergic: (hives, seasonal or environmental allergies, HIV exposure)    Hematologic/Lymphatic: (lymph node enlargement, easy bleeding or bruising)    History obtained via chart review and patient    PCP is Hardy White MD       Current Meds:    Prior to Admission medications    Medication Sig Start Date End Date Taking? Authorizing Provider   diclofenac (VOLTAREN) 75 MG EC tablet Take 1 tablet by mouth 2 times daily as needed for Pain 9/21/22   Sheila Ham MD   butalbital-APAP-caffeine -40 MG CAPS per capsule Take 1 capsule by mouth every 6 hours as needed for Headaches or Migraine 8/19/22 12/17/22  Raina Santillan MD   buPROPion (WELLBUTRIN XL) 150 MG extended release tablet TAKE 1 TABLET BY MOUTH ONE TIME A DAY IN THE MORNING 8/4/22   Gar Gilford, MD   lamoTRIgine (LAMICTAL) 200 MG tablet Take 1 tablet by mouth in the morning. 8/4/22 11/2/22  Gar Gilford, MD   QUEtiapine (SEROQUEL) 100 MG tablet Take 1.5 tablets by mouth nightly 8/2/22 9/21/22  Gar Gilford, MD   clonazePAM (KLONOPIN) 0.5 MG tablet Take 1 tablet by mouth 2 times daily as needed for Anxiety for up to 30 days. 8/2/22 9/21/22  Gar Gilford, MD   tranexamic acid (LYSTEDA) 650 MG TABS tablet Take 2 tablets by mouth 3 times daily 5/31/22   CHRIS Musa   topiramate (TOPAMAX) 100 MG tablet TAKE 1 TABLET BY MOUTH TWO TIMES A DAY 4/26/22   Raina Santillan MD   AIMOVIG 140 MG/ML SOAJ INJECT 140MG UNDER THE SKIN ONCE MONTHLY 3/29/22   Raina Santillan MD       MSE:  Appearance: Appropriately groomed.  Made good eye contact. Behavior: Bright  Speech: Normal in tone, volume, and quality. No slurring, dysarthria or pressured speech noted. Mood: \"Better\"   Affect: Mood congruent. Thought Process: Appears linear, logical and goal oriented. Causality appears intact. Thought Content: Denies active suicidal and homicidal ideations. No overt delusions or paranoia appreciated. Perceptions: Denies auditory or visual hallucinations at present time. Not responding to internal stimuli. Concentration: Intact. Orientation: to person, place, date, and situation. Language: Intact. Fund of information: Intact. Memory: Recent and remote appear intact. Impulsivity: Limited. Neurovegitative: Fair appetite and sleep. Insight: Fair. Judgment: Fair. Lab Results   Component Value Date     07/27/2022    K 3.7 07/27/2022     07/27/2022    CO2 21 (L) 07/27/2022    BUN 14 07/27/2022    CREATININE 0.6 07/27/2022    GLUCOSE 99 07/27/2022    CALCIUM 9.1 07/27/2022    PROT 6.9 07/27/2022    LABALBU 4.4 07/27/2022    BILITOT <0.2 07/27/2022    ALKPHOS 73 07/27/2022    AST 11 07/27/2022    ALT 8 07/27/2022    LABGLOM >60 07/27/2022    GFRAA >59 07/27/2022     Lab Results   Component Value Date/Time     07/27/2022 08:16 AM    K 3.7 07/27/2022 08:16 AM     07/27/2022 08:16 AM    CO2 21 07/27/2022 08:16 AM    BUN 14 07/27/2022 08:16 AM    CREATININE 0.6 07/27/2022 08:16 AM    GLUCOSE 99 07/27/2022 08:16 AM    CALCIUM 9.1 07/27/2022 08:16 AM      No results found for: CHOL  No results found for: TRIG  No results found for: HDL  No results found for: LDLCHOLESTEROL, LDLCALC  No results found for: LABVLDL, VLDL  No results found for: CHOLHDLRATIO  No results found for: LABA1C  No results found for: EAG  No results found for: TSHFT4, TSH  No results found for: VITD25    Assessments Administered:      Assessment:   1. Bipolar II disorder, mild, depressed, with anxious distress ([facility]    2.  Generalized anxiety disorder with panic attacks    3. Chronic post-traumatic stress disorder (PTSD)    4. Insomnia due to other mental disorder             No evidence of acute suicidality, homicidality or psychosis observed today. Psychiatrically stable. Plan:  1. Continue current regimen. The risks, benefits, side effects, indications, contraindications, and adverse effects of the medications have been discussed. Yes.  2. The pt has verbalized understanding and has capacity to give informed consent. 3. The Alisson Garcia report has been reviewed according to [facility] regulations. 4. Supportive therapy offered. Patient will continue seeing her therapist.  5. Follow up: 4  mo   6. The patient has been advised to call with any problems. 7. Controlled substance Treatment Plan: NA  8. The above listed medications have been continued, modifications in meds and other orders/labs as follows: No orders of the defined types were placed in this encounter. No orders of the defined types were placed in this encounter. 9. Additional comments: Consider checking thyroid function and vitamin B12 and D levels       10. Over 50% of the total visit time of 31  minutes was spent on counseling and/or coordination of care of:                        1. Bipolar II disorder, mild, depressed, with anxious distress ([facility]    2. Generalized anxiety disorder with panic attacks    3. Chronic post-traumatic stress disorder (PTSD)    4.  Insomnia due to other mental disorder            Bailey Carcamo MD

## 2022-09-23 ASSESSMENT — ENCOUNTER SYMPTOMS
APNEA: 0
EYE DISCHARGE: 0
VOMITING: 0
EYE ITCHING: 0
COLOR CHANGE: 0
FACIAL SWELLING: 0
BACK PAIN: 1
STRIDOR: 0
NAUSEA: 0

## 2022-09-26 ENCOUNTER — OFFICE VISIT (OUTPATIENT)
Dept: PSYCHOLOGY | Age: 32
End: 2022-09-26
Payer: COMMERCIAL

## 2022-09-26 DIAGNOSIS — F31.81 BIPOLAR 2 DISORDER (HCC): Primary | ICD-10-CM

## 2022-09-26 PROCEDURE — 90834 PSYTX W PT 45 MINUTES: CPT | Performed by: SOCIAL WORKER

## 2022-09-26 ASSESSMENT — ANXIETY QUESTIONNAIRES
5. BEING SO RESTLESS THAT IT IS HARD TO SIT STILL: 0-NOT AT ALL
3. WORRYING TOO MUCH ABOUT DIFFERENT THINGS: 0-NOT AT ALL
2. NOT BEING ABLE TO STOP OR CONTROL WORRYING: 2-OVER HALF THE DAYS
6. BECOMING EASILY ANNOYED OR IRRITABLE: 2-OVER HALF THE DAYS
7. FEELING AFRAID AS IF SOMETHING AWFUL MIGHT HAPPEN: 0-NOT AT ALL
4. TROUBLE RELAXING: 0-NOT AT ALL
GAD7 TOTAL SCORE: 6
1. FEELING NERVOUS, ANXIOUS, OR ON EDGE: 2

## 2022-09-26 ASSESSMENT — PATIENT HEALTH QUESTIONNAIRE - PHQ9
7. TROUBLE CONCENTRATING ON THINGS, SUCH AS READING THE NEWSPAPER OR WATCHING TELEVISION: 0
SUM OF ALL RESPONSES TO PHQ9 QUESTIONS 1 & 2: 3
1. LITTLE INTEREST OR PLEASURE IN DOING THINGS: 1
5. POOR APPETITE OR OVEREATING: 0
2. FEELING DOWN, DEPRESSED OR HOPELESS: 2
SUM OF ALL RESPONSES TO PHQ QUESTIONS 1-9: 7
SUM OF ALL RESPONSES TO PHQ QUESTIONS 1-9: 7
4. FEELING TIRED OR HAVING LITTLE ENERGY: 1
9. THOUGHTS THAT YOU WOULD BE BETTER OFF DEAD, OR OF HURTING YOURSELF: 0
10. IF YOU CHECKED OFF ANY PROBLEMS, HOW DIFFICULT HAVE THESE PROBLEMS MADE IT FOR YOU TO DO YOUR WORK, TAKE CARE OF THINGS AT HOME, OR GET ALONG WITH OTHER PEOPLE: 0
8. MOVING OR SPEAKING SO SLOWLY THAT OTHER PEOPLE COULD HAVE NOTICED. OR THE OPPOSITE, BEING SO FIGETY OR RESTLESS THAT YOU HAVE BEEN MOVING AROUND A LOT MORE THAN USUAL: 0
3. TROUBLE FALLING OR STAYING ASLEEP: 1
6. FEELING BAD ABOUT YOURSELF - OR THAT YOU ARE A FAILURE OR HAVE LET YOURSELF OR YOUR FAMILY DOWN: 2
SUM OF ALL RESPONSES TO PHQ QUESTIONS 1-9: 7
SUM OF ALL RESPONSES TO PHQ QUESTIONS 1-9: 7

## 2022-09-26 NOTE — PROGRESS NOTES
Behavioral Health Consultation  Katina So MSW, LCSW  2022  10:02 AM      Time spent with Patient: 45 minutes  This is patient's seventh  [facility] appointment. Reason for Consult:    Chief Complaint   Patient presents with    Follow-up    Depression    Anxiety     Referring Provider: No referring provider defined for this encounter. Feedback given to PCP. S:  Patient reports problems with feeling stress, anxiety, and depression. Pt reported her sister's mother had double pneumonia, they went to [place] to help her, pt's car \"blew up,\" and pt is on vacation until 10/4. Pt shared her work stressors and punishment from management when having a panic attack. Discussed problem solving work and personal life issues. Addressed current and underlying issues, explored and released associated emotions, explored new ways to deal and cope with these problems. O:  MSE:    Mood    Anxious  Depressed  Affect    normal affect  Appetite normal  Sleep disturbance No  Fatigue No  Loss of pleasure No  Attention/Concentration    intact  Morbid ideation No  Suicide Assessment    no suicidal ideation        A:  Patient presents for consult due to problems with depression and anxiety.     PHQ Scores 2022   PHQ2 Score 3 2 4 4 3 1 1   PHQ9 Score 7 5 16 10 8 4 5     Interpretation of Total Score Depression Severity: 1-4 = Minimal depression, 5-9 = Mild depression, 10-14 = Moderate depression, 15-19 = Moderately severe depression, 20-27 = Severe depression    BRETT-7  Feeling nervous, anxious, or on edge: More than half the days  Not able to stop or control worryin-Over half the days  Worrying too much about different things: 0-Not at all  Trouble relaxin-Not at all  Being so restless that it's hard to sit still: 0-Not at all  Becoming easily annoyed or irritable: 2-Over half the days  Feeling afraid as if something awful might happen: 0-Not at all  BRETT-7 Total Score: 6    BRETT-7 score interpretation:  0-4 Subclinical, 5-9 Mild, 10-14 Moderate, 15-21 Severe    Continued consultation is clinically/medically necessary to support in learning new skills and build confidence to deal better with these issues. Patient response to consults, finds new strategies helpful. Diagnosis:    1. [facility] ([facility]          Diagnosis Date    Anxiety     B[facility] ([facility] 01/25/2021    DDD (degenerative disc disease), lumbosacral 01/05/2022    Headache(784.0)     Headache, paroxysmal hemicrania, episodic     History of posttraumatic stress disorder (PTSD) 12/01/2020    Kidney stone     Migraine     Tension headache          Plan:  Pt interventions:  Practiced assertive communication, Trained in relaxation strategies, Trained in improving communication skills, Provided education, Discussed self-care (sleep, nutrition, rewarding activities, social support, exercise), Discussed potential barriers to change, [place]-setting to identify pt's primary goals for [facility] visit / overall health, Supportive techniques, Emphasized self-care as important for managing overall health, and Identified maladaptive thoughts      Pt Behavioral Change Plan:  See Pt Instructions   Recommendations to patient:      1. Practice new coping, stress management, relaxation skills at least                   two times a day for at least 10-30 minutes. 2. Find at least one positive outlet per day that makes you feel better. 3. Talk things over with a good friend. Practice letting things go. 4. Stop, breathe, reset. \"I am ok. \"     Scheduled follow up appointment. Call for a sooner appointment if needed or if you need to change or cancel you appointment.     See Pt Instructions

## 2022-09-26 NOTE — PATIENT INSTRUCTIONS
Scheduled follow up appointment. Call for a sooner appointment if needed or if you need to change or cancel you appointment. Theodora May, [phone] and ask for Theodora, She might answer it as NEW [facility]\"  You can also send her a message on My Chart. Be aware that all my messages are linked to her. If you receive a survey after visiting one of our offices, please take time to share your experience about your office visit. These surveys are confidential and no health information about you is shared. We highly welcome your feedback to continuously improve our services for you. Recommendations to patient:      1. Practice new coping, stress management, relaxation skills at least                   two times a day for at least 10-30 minutes. 2. Find at least one positive outlet per day that makes you feel better. 3. Talk things over with a good friend. Practice letting things go. 4. Stop, breathe, reset. \"I am ok. \"    Steps for SOLVE    When you do take the time to think about problems or concerns in your life, it will be helpful to think about them in a constructive way. The SOLVE technique described below will help you to develop effective solutions to problems that can be resolved. S gabriel the problem  O utline the problem  L ist possible solutions  V iew the consequences  E xecute your solution    Read through the SOLVE steps that are outlined below. 1. State the problem. The first step to solving problems is to identify what they are. Once youve identified a situation, rate how much of a problem it is for you on a 0 to 10 scale, where 0 is not at all a problem and 10 is very much a problem.       2. Outline the problem. Outline the specific aspects of the problem.   Include information such as:    a) who was there,  b) where did it occur,  c) when did it happen,  d) what led up to the event,  e) what happened after the event, and  f) how did you respond to the event. How you responded to the situation is important. Why? Because many times you cant change what happens, but often you can change how you respond to what happens. The goal of SOLVE is to try to find new and better ways of responding to problem situations. Outlining the problem may be the most important part of the SOLVE exercise. Accurately describing a problem often helps reveal good solutions. Be sure to ask yourself what role you are playing in the problem situation. Pay special attention to how your thoughts may be contributing to the problem. This can be a difficult step. The point is not to make yourself feel guilty or give yourself a hard time, but to help you to realize that there is a lot you can do to resolve the problem. 3. List possible solutions. The goal here is to think of many possible solutions. Be careful not to start evaluating any of the solutions before you have finished listing possible options. Evaluation will lead to criticism, and criticism might stop you from thinking of possible solutions before you have discovered the best one! Here are some guidelines to help you generate as many ideas as you can:    a) Be creative and willing to give off the cuff solutions. Dont be afraid to come up with unlikely or unusual suggestions. At a second glance, the unlikely solution may be the most likely to succeed. b) Quantity is best. The more ideas you can generate the greater the chance that youll come up with a solution that will work well. c) Combine and improve your ideas. Go back over your list to see if any of your ideas can be grouped together. Sometimes a combination of solutions is your best bet. d) Consider changing your reactions as a possible solution. Sometimes problem situations can not be resolved or the solution takes a long time to be effective.   In either of these cases, helpful solutions often include doing things to change how you are reacting to situation. 4. View the consequences. After you have listed the solutions, then decide which one is best.  Examine the possible positive and negative consequences of each solution. Make some notes on what these consequence are. It may help to ask yourself questions like these:    a) Is this a long-term or a short-term solution? What will happen in the short-run if I carry out this solution? What will happen in the long-run?    b) Will more good come from this solution than harm? c) How will this solution affect other people? d) How likely is it that I really can carry out this solution? e) How will I feel if I choose this solution? Might I regret it? Will I be proud of myself? f) Will this solution only partly solve the problem? Will it completely get rid of the problem? Try to come up with at least one positive and one negative consequence of each possible solution. After you have carefully considered the possible solutions, choose the best one. When viewing possible solutions, you should try to be optimistic. An important purpose of SOLVE is to encourage you to think of solutions you may not have considered before and to help you try something new. Being negative or pessimistic will reduce your chances of carrying out new solutions. When trying to change your behavior, it can be very helpful to have the support of your family or your friends. Its often a good idea to take the time to talk to a friend or a loved one about the changes you are trying to make. You can ask them to help you generate solutions to your problem situation. Or you can ask them for feedback about a solution you have chosen. Knowing that someone you trust and can depend on agrees with your choice can help you have confidence in your decision. Its nice to have someone to depend on for help in case everything doesnt work out just right.   And its nice to have someone to share your accomplishments with when things go well. 5. Execute your solution. This is the toughest part of the exercise. Before actually trying to carry out your solution, go over it in your mind. Try to anticipate any possible road blocks so that you wont get discouraged if things dont work out. When youre ready, execute your solution. Then write down some notes on how things turned out. Is the problem situation less stressful? How satisfied are your with the outcome? Then rate your problem again using the same scale you used in step 1. If youre not satisfied, try going back over steps 2, 3, and 4, and select a different solution. Then try again. Problem-Solving. When we are uncertain about what action can be taken to handle a situation, using the problem-solving steps below can help point us in the right direction. Get information about the problem. We often know something is wrong but dont know exactly what. Identifying the problem in clear, specific terms is essential to resolving it. Generate options for resolving the problem. There are usually a number of different ways of dealing with a problem. At this step, its important to come up with as many potential solutions as possible, even if they dont sound quite right at first.  Suspend any judgment of the options until you have generated as many as possible. Evaluate the advantages and disadvantages of implementing each option. Not all options will be as good as others. Think about how successful you think each option will be at resolving the problem, and what might get in the way of that option actually working. Choose an option and implement it. Which option do you think will work the best overall? Make a plan for implementing the option, and put it into action. Analyze whether the option worked to resolve the problem. Keep track of what changes occur as you implement the option chosen.   If the option worked, the problem is resolved and you are done. If not, you need to re-think the situation, by repeating the process. Repeat Steps 1 through 5 as needed. Problem-Solving Worksheet   1. Write out the problem: _______________________________________________________   2. Brainstorm all possible solutions. Write down anything you can think of. The goal is to get your mind flowing with ideas: ______________________________________________________   3. Critically evaluate your ideas. Cross out any that are clearly unrealistic, outside your control or impossible. Of those that remain, [place] the top three. Write the top three below, in any order:    ____________________ ____________________ ____________________    For each one list all the possible pros and cons. Pros:_________________ Pros:___________________ Pros:_______________   _____________________ _______________________ ___________________   Cons:________________ Cons:__________________ Cons:______________   _____________________ _______________________ ___________________   4. On the basis of your pros and cons, select one that you feel has the best chances of working. Implement the chosen solution.  Define how you will know if the solution is working:   ______________________________________________________________________________   Assess the outcome on the following scale: _____________  _____________  _____________  _____________  _____________    No improvement  Little improvement  Some improvement  A lot of improvement  Total improvement

## 2022-10-27 ENCOUNTER — TELEPHONE (OUTPATIENT)
Dept: NEUROLOGY | Age: 32
End: 2022-10-27

## 2022-10-27 ENCOUNTER — HOSPITAL ENCOUNTER (EMERGENCY)
Age: 32
Discharge: HOME | End: 2022-10-27
Attending: EMERGENCY MEDICINE
Payer: COMMERCIAL

## 2022-10-27 VITALS
RESPIRATION RATE: 16 BRPM | SYSTOLIC BLOOD PRESSURE: 106 MMHG | OXYGEN SATURATION: 98 % | DIASTOLIC BLOOD PRESSURE: 65 MMHG | HEART RATE: 75 BPM | WEIGHT: 145 LBS | BODY MASS INDEX: 24.16 KG/M2 | HEIGHT: 65 IN | TEMPERATURE: 97.6 F

## 2022-10-27 DIAGNOSIS — G43.909 MIGRAINE WITHOUT STATUS MIGRAINOSUS, NOT INTRACTABLE, UNSPECIFIED MIGRAINE TYPE: Primary | ICD-10-CM

## 2022-10-27 PROCEDURE — 99284 EMERGENCY DEPT VISIT MOD MDM: CPT

## 2022-10-27 PROCEDURE — 6360000002 HC RX W HCPCS: Performed by: EMERGENCY MEDICINE

## 2022-10-27 PROCEDURE — 96372 THER/PROPH/DIAG INJ SC/IM: CPT

## 2022-10-27 RX ORDER — TOPIRAMATE 100 MG/1
TABLET, FILM COATED ORAL
Qty: 180 TABLET | Refills: 3 | Status: SHIPPED | OUTPATIENT
Start: 2022-10-27

## 2022-10-27 RX ORDER — HYDROMORPHONE HYDROCHLORIDE 1 MG/ML
1 INJECTION, SOLUTION INTRAMUSCULAR; INTRAVENOUS; SUBCUTANEOUS ONCE
Status: COMPLETED | OUTPATIENT
Start: 2022-10-27 | End: 2022-10-27

## 2022-10-27 RX ORDER — PROMETHAZINE HYDROCHLORIDE 25 MG/ML
25 INJECTION, SOLUTION INTRAMUSCULAR; INTRAVENOUS ONCE
Status: COMPLETED | OUTPATIENT
Start: 2022-10-27 | End: 2022-10-27

## 2022-10-27 RX ORDER — HALOPERIDOL 5 MG/ML
2 INJECTION INTRAMUSCULAR ONCE
Status: COMPLETED | OUTPATIENT
Start: 2022-10-27 | End: 2022-10-27

## 2022-10-27 RX ADMIN — HALOPERIDOL LACTATE 2 MG: 5 INJECTION, SOLUTION INTRAMUSCULAR at 21:53

## 2022-10-27 RX ADMIN — HYDROMORPHONE HYDROCHLORIDE 1 MG: 1 INJECTION, SOLUTION INTRAMUSCULAR; INTRAVENOUS; SUBCUTANEOUS at 21:54

## 2022-10-27 RX ADMIN — PROMETHAZINE HYDROCHLORIDE 25 MG: 25 INJECTION INTRAMUSCULAR; INTRAVENOUS at 21:54

## 2022-10-27 ASSESSMENT — PAIN DESCRIPTION - LOCATION: LOCATION: HEAD

## 2022-10-27 ASSESSMENT — PAIN SCALES - GENERAL
PAINLEVEL_OUTOF10: 9
PAINLEVEL_OUTOF10: 9

## 2022-10-27 NOTE — TELEPHONE ENCOUNTER
Called and spoke with patient to let her know that at this time Dr. Angle Howard does not have anything any sooner open. Patient is aware of this information. Patient stating her medication is not working for her. She takes 200mg Topamax, Fiorect and Amiovig Injection and nothing is helping. Please advise.

## 2022-10-27 NOTE — TELEPHONE ENCOUNTER
Requested Prescriptions     Pending Prescriptions Disp Refills    topiramate (TOPAMAX) 100 MG tablet 180 tablet 3     Sig: TAKE 1 TABLET BY MOUTH TWO TIMES A DAY       Last Office Visit: 12/28/2021  Next Office Visit: 12/29/2022  Last Medication Refill: 4/26/22 with 3 REESE

## 2022-10-27 NOTE — TELEPHONE ENCOUNTER
Spoke to patient and instructed that she may need to go to ED to by evaluated and get a injection to stop this headache. I also instructed to call office often to check for cancellation. She voice understanding.

## 2022-10-27 NOTE — Clinical Note
Qiaan Flynn was seen and treated in our emergency department on 10/27/2022. She may return to work on 10/29/2022. If you have any questions or concerns, please don't hesitate to call.       Pauly Martinez MD

## 2022-10-27 NOTE — Clinical Note
Rosalinda Hart was seen and treated in our emergency department on 10/27/2022. She may return to work on 10/29/2022. If you have any questions or concerns, please don't hesitate to call.       Edis Foote MD

## 2022-10-27 NOTE — TELEPHONE ENCOUNTER
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
## 2022-11-03 ASSESSMENT — ENCOUNTER SYMPTOMS
COUGH: 0
PHOTOPHOBIA: 1
SHORTNESS OF BREATH: 0
NAUSEA: 1

## 2022-11-25 DIAGNOSIS — N92.0 MENORRHAGIA WITH REGULAR CYCLE: ICD-10-CM

## 2022-11-25 DIAGNOSIS — N94.6 DYSMENORRHEA: ICD-10-CM

## 2022-11-25 RX ORDER — QUETIAPINE FUMARATE 100 MG/1
150 TABLET, FILM COATED ORAL NIGHTLY
Qty: 45 TABLET | Refills: 3 | Status: SHIPPED | OUTPATIENT
Start: 2022-11-25 | End: 2022-12-25

## 2022-11-25 NOTE — TELEPHONE ENCOUNTER
Last office visit note:             Progress Note           Jerome Carrillo 1990                             Chief Complaint   Patient presents with    Medication Check                  Subjective:    28-year-old white female with history of bipolar disorder, anxiety, panic attacks, presents for follow-up. Currently kept on Lamictal, Wellbutrin, Seroquel - increased last time. Added Klonopin last time. Patient reports compliance. No SEs. Bright affect today. Smiling. Denies SI. Sleeping well. Good family support. Going on vacation soon. Current Substance Use: Never had any issues with a stimulant, opioid or benzodiazepine abuse. Drank too much in the past.  Currently avoiding alcohol. Smokes cigarettes.       BP: BP 94/67   Pulse 86   Temp 97.8 °F (36.6 °C)   Ht 5' 5\" (1.651 m)   Wt 139 lb 9.6 oz (63.3 kg)   SpO2 100%   BMI 23.23 kg/m²         Review of Systems - 14 point review:  All negative:      Constitutional: (fevers, chills, night sweats, wt loss/gain, change in appetite, fatigue, somnolence)     HEENT: (ear pain or discharge, hearing loss, ear ringing, sinus pressure, nosebleed, nasal discharge, sore throat, oral sores, tooth pain, bleeding gums, hoarse voice, neck pain)      Cardiovascular: (HTN, chest pain, elevated cholesterol/lipids, palpitations, leg swelling, leg pain with walking)     Respiratory: (cough, wheezing, snoring, SOB with activity (dyspnea), SOB while lying flat (orthopnea), awakening with severe SOB (paroxysmal nocturnal dyspnea))     Gastrointestinal: (NVD, constipation, abdominal pain, bright red stools, black tarry stools, stool incontinence)     Genitourinary:  (pelvic pain, burning or frequency of urination, urinary urgency, blood in urine incomplete bladder emptying, urinary incontinence, STD; MEN: testicular pain or swelling, erectile dysfunction; WOMEN: LMP, heavy menstrual bleeding (menorrhagia), irregular periods, postmenopausal bleeding, menstrual pain (dymenorrhea, vaginal discharge)     Musculoskeletal: (bone pain/fracture, joint pain or swelling, musle pain)     Integumentary: (rashes, acne, non-healing sores, itching, breast lumps, breast pain, nipple discharge, hair loss)     Neurologic: (HA, muscle weakness, paresthesias (numbness, coldness, crawling or prickling), memory loss, seizure, dizziness)     Endocrine: (heat or cold intolerance, excessive thirst (polydipsia), excessive hunger (polyphagia))     Immune/Allergic: (hives, seasonal or environmental allergies, HIV exposure)     Hematologic/Lymphatic: (lymph node enlargement, easy bleeding or bruising)     History obtained via chart review and patient     PCP is Will Holden MD         Current Meds:             Prior to Admission medications    Medication Sig Start Date End Date Taking? Authorizing Provider   diclofenac (VOLTAREN) 75 MG EC tablet Take 1 tablet by mouth 2 times daily as needed for Pain 9/21/22     Tash Horner MD   butalbital-APAP-caffeine -40 MG CAPS per capsule Take 1 capsule by mouth every 6 hours as needed for Headaches or Migraine 8/19/22 12/17/22   Meredith Marina MD   buPROPion (WELLBUTRIN XL) 150 MG extended release tablet TAKE 1 TABLET BY MOUTH ONE TIME A DAY IN THE MORNING 8/4/22     Aubree Powell MD   lamoTRIgine (LAMICTAL) 200 MG tablet Take 1 tablet by mouth in the morning. 8/4/22 11/2/22   Aubree Powell MD   QUEtiapine (SEROQUEL) 100 MG tablet Take 1.5 tablets by mouth nightly 8/2/22 9/21/22   Aubree Powell MD   clonazePAM (KLONOPIN) 0.5 MG tablet Take 1 tablet by mouth 2 times daily as needed for Anxiety for up to 30 days.  8/2/22 9/21/22   Aubree Powell MD   tranexamic acid (LYSTEDA) 650 MG TABS tablet Take 2 tablets by mouth 3 times daily 5/31/22     CHRIS Ballesteros   topiramate (TOPAMAX) 100 MG tablet TAKE 1 TABLET BY MOUTH TWO TIMES A DAY 4/26/22     Meredith Marina MD   AIMOVIG 140 MG/ML SOAJ INJECT 140MG UNDER THE SKIN ONCE MONTHLY 3/29/22     Devin Story MD         MSE:  Appearance: Appropriately groomed. Made good eye contact. Behavior: Bright  Speech: Normal in tone, volume, and quality. No slurring, dysarthria or pressured speech noted. Mood: \"Better\"   Affect: Mood congruent. Thought Process: Appears linear, logical and goal oriented. Causality appears intact. Thought Content: Denies active suicidal and homicidal ideations. No overt delusions or paranoia appreciated. Perceptions: Denies auditory or visual hallucinations at present time. Not responding to internal stimuli. Concentration: Intact. Orientation: to person, place, date, and situation. Language: Intact. Fund of information: Intact. Memory: Recent and remote appear intact. Impulsivity: Limited. Neurovegitative: Fair appetite and sleep. Insight: Fair. Judgment: Fair.               Lab Results   Component Value Date      07/27/2022     K 3.7 07/27/2022      07/27/2022     CO2 21 (L) 07/27/2022     BUN 14 07/27/2022     CREATININE 0.6 07/27/2022     GLUCOSE 99 07/27/2022     CALCIUM 9.1 07/27/2022     PROT 6.9 07/27/2022     LABALBU 4.4 07/27/2022     BILITOT <0.2 07/27/2022     ALKPHOS 73 07/27/2022     AST 11 07/27/2022     ALT 8 07/27/2022     LABGLOM >60 07/27/2022     GFRAA >59 07/27/2022            Lab Results   Component Value Date/Time      07/27/2022 08:16 AM     K 3.7 07/27/2022 08:16 AM      07/27/2022 08:16 AM     CO2 21 07/27/2022 08:16 AM     BUN 14 07/27/2022 08:16 AM     CREATININE 0.6 07/27/2022 08:16 AM     GLUCOSE 99 07/27/2022 08:16 AM     CALCIUM 9.1 07/27/2022 08:16 AM      No results found for: CHOL  No results found for: TRIG  No results found for: HDL  No results found for: LDLCHOLESTEROL, LDLCALC  No results found for: LABVLDL, VLDL  No results found for: CHOLHDLRATIO  No results found for: LABA1C  No results found for: EAG  No results found for: TSHFT4, TSH  No results found for: VITD25 Assessments Administered:        Assessment:    1. Bipolar II disorder, mild, depressed, with anxious distress ([address]    2. Generalized anxiety disorder with panic attacks    3. Chronic post-traumatic stress disorder (PTSD)    4. Insomnia due to other mental disorder                No evidence of acute suicidality, homicidality or psychosis observed today. Psychiatrically stable. Plan:  1. Continue current regimen. The risks, benefits, side effects, indications, contraindications, and adverse effects of the medications have been discussed. Yes.  2. The pt has verbalized understanding and has capacity to give informed consent. 3. The Susan Pulling report has been reviewed according to [facility] regulations. 4. Supportive therapy offered. Patient will continue seeing her therapist.  5. Follow up:    4  mo   6. The patient has been advised to call with any problems. 7. Controlled substance Treatment Plan: NA  8. The above listed medications have been continued, modifications in meds and other orders/labs as follows: No orders of the defined types were placed in this encounter. No orders of the defined types were placed in this encounter. 9. Additional comments: Consider checking thyroid function and vitamin B12 and D levels         10. Over 50% of the total visit time of 31  minutes was spent on counseling and/or coordination of care of:                         1. Bipolar II disorder, mild, depressed, with anxious distress ([address]    2. Generalized anxiety disorder with panic attacks    3. Chronic post-traumatic stress disorder (PTSD)    4. Insomnia due to other mental disorder                Lenell Scheuermann, MD Henjose armando Catalina called to request a refill on her medication.       Last office visit : 9/21/2022   Next office visit : 1/17/2023     Requested Prescriptions     Pending Prescriptions Disp Refills    QUEtiapine (SEROQUEL) 100 MG tablet 45 tablet 3     Sig: Take 1.5 tablets by mouth nightly            Larraine Fulling

## 2022-11-28 RX ORDER — TRANEXAMIC ACID 650 MG/1
1300 TABLET ORAL 3 TIMES DAILY
Qty: 30 TABLET | Refills: 5 | Status: SHIPPED | OUTPATIENT
Start: 2022-11-28

## 2022-12-09 DIAGNOSIS — G44.221 CHRONIC TENSION-TYPE HEADACHE, INTRACTABLE: ICD-10-CM

## 2022-12-09 DIAGNOSIS — F41.0 GENERALIZED ANXIETY DISORDER WITH PANIC ATTACKS: ICD-10-CM

## 2022-12-09 DIAGNOSIS — F41.1 GENERALIZED ANXIETY DISORDER WITH PANIC ATTACKS: ICD-10-CM

## 2022-12-09 DIAGNOSIS — G43.019 INTRACTABLE MIGRAINE WITHOUT AURA AND WITHOUT STATUS MIGRAINOSUS: ICD-10-CM

## 2022-12-09 RX ORDER — BUTALBITAL, ACETAMINOPHEN AND CAFFEINE 300; 40; 50 MG/1; MG/1; MG/1
1 CAPSULE ORAL EVERY 6 HOURS PRN
Qty: 40 CAPSULE | Refills: 1 | Status: SHIPPED | OUTPATIENT
Start: 2022-12-09 | End: 2023-04-08

## 2022-12-09 RX ORDER — CLONAZEPAM 0.5 MG/1
TABLET ORAL
Qty: 60 TABLET | Refills: 1 | OUTPATIENT
Start: 2022-12-09

## 2022-12-09 NOTE — TELEPHONE ENCOUNTER
Kassi Hernandez called to request a refill on her medication. Last office visit : 9/21/2022 Ry Klein MD  Next office visit : 1/17/2023 Ry Klein MD    Requested Prescriptions     Pending Prescriptions Disp Refills    clonazePAM (KLONOPIN) 0.5 MG tablet 60 tablet 1     Sig: Take 1 tablet by mouth 2 times daily as needed for Anxiety for up to 30 days. Jonathan Gao           9/21/2022 1:07 PM                             Progress Note           Kassi Hernandez 1990                             Chief Complaint   Patient presents with    Medication Check                  Subjective:    24-year-old white female with history of bipolar disorder, anxiety, panic attacks, presents for follow-up. Currently kept on Lamictal, Wellbutrin, Seroquel - increased last time. Added Klonopin last time. Patient reports compliance. No SEs. Bright affect today. Smiling. Denies SI. Sleeping well. Good family support. Going on vacation soon. Current Substance Use: Never had any issues with a stimulant, opioid or benzodiazepine abuse. Drank too much in the past.  Currently avoiding alcohol. Smokes cigarettes.       BP: BP 94/67   Pulse 86   Temp 97.8 °F (36.6 °C)   Ht 5' 5\" (1.651 m)   Wt 139 lb 9.6 oz (63.3 kg)   SpO2 100%   BMI 23.23 kg/m²         Review of Systems - 14 point review:  All negative:      Constitutional: (fevers, chills, night sweats, wt loss/gain, change in appetite, fatigue, somnolence)     HEENT: (ear pain or discharge, hearing loss, ear ringing, sinus pressure, nosebleed, nasal discharge, sore throat, oral sores, tooth pain, bleeding gums, hoarse voice, neck pain)      Cardiovascular: (HTN, chest pain, elevated cholesterol/lipids, palpitations, leg swelling, leg pain with walking)     Respiratory: (cough, wheezing, snoring, SOB with activity (dyspnea), SOB while lying flat (orthopnea), awakening with severe SOB (paroxysmal nocturnal dyspnea))     Gastrointestinal: (NVD, constipation, abdominal pain, bright red stools, black tarry stools, stool incontinence)     Genitourinary:  (pelvic pain, burning or frequency of urination, urinary urgency, blood in urine incomplete bladder emptying, urinary incontinence, STD; MEN: testicular pain or swelling, erectile dysfunction; WOMEN: LMP, heavy menstrual bleeding (menorrhagia), irregular periods, postmenopausal bleeding, menstrual pain (dymenorrhea, vaginal discharge)     Musculoskeletal: (bone pain/fracture, joint pain or swelling, musle pain)     Integumentary: (rashes, acne, non-healing sores, itching, breast lumps, breast pain, nipple discharge, hair loss)     Neurologic: (HA, muscle weakness, paresthesias (numbness, coldness, crawling or prickling), memory loss, seizure, dizziness)     Endocrine: (heat or cold intolerance, excessive thirst (polydipsia), excessive hunger (polyphagia))     Immune/Allergic: (hives, seasonal or environmental allergies, HIV exposure)     Hematologic/Lymphatic: (lymph node enlargement, easy bleeding or bruising)     History obtained via chart review and patient     PCP is Melinda Noriega MD         Current Meds:     Home Medications           Prior to Admission medications    Medication Sig Start Date End Date Taking? Authorizing Provider   diclofenac (VOLTAREN) 75 MG EC tablet Take 1 tablet by mouth 2 times daily as needed for Pain 9/21/22     Adolfo Pearson MD   butalbital-APAP-caffeine -40 MG CAPS per capsule Take 1 capsule by mouth every 6 hours as needed for Headaches or Migraine 8/19/22 12/17/22   Venice Hodgkin, MD   buPROPion (WELLBUTRIN XL) 150 MG extended release tablet TAKE 1 TABLET BY MOUTH ONE TIME A DAY IN THE MORNING 8/4/22     Rafita Tay MD   lamoTRIgine (LAMICTAL) 200 MG tablet Take 1 tablet by mouth in the morning.  8/4/22 11/2/22   Rafita Tay MD   QUEtiapine (SEROQUEL) 100 MG tablet Take 1.5 tablets by mouth nightly 8/2/22 9/21/22   Rafita Tay MD   clonazePAM (KLONOPIN) 0.5 MG tablet Take 1 tablet by mouth 2 times daily as needed for Anxiety for up to 30 days. 8/2/22 9/21/22   Emir Paris MD   tranexamic acid (LYSTEDA) 650 MG TABS tablet Take 2 tablets by mouth 3 times daily 5/31/22     CHRIS Jauregui   topiramate (TOPAMAX) 100 MG tablet TAKE 1 TABLET BY MOUTH TWO TIMES A DAY 4/26/22     Leatha Auguste MD   AIMOVIG 140 MG/ML SOAJ INJECT 140MG UNDER THE SKIN ONCE MONTHLY 3/29/22     Leatha Auguste MD            MSE:  Appearance: Appropriately groomed. Made good eye contact. Behavior: Bright  Speech: Normal in tone, volume, and quality. No slurring, dysarthria or pressured speech noted. Mood: \"Better\"   Affect: Mood congruent. Thought Process: Appears linear, logical and goal oriented. Causality appears intact. Thought Content: Denies active suicidal and homicidal ideations. No overt delusions or paranoia appreciated. Perceptions: Denies auditory or visual hallucinations at present time. Not responding to internal stimuli. Concentration: Intact. Orientation: to person, place, date, and situation. Language: Intact. Fund of information: Intact. Memory: Recent and remote appear intact. Impulsivity: Limited. Neurovegitative: Fair appetite and sleep. Insight: Fair. Judgment: Fair.               Lab Results   Component Value Date      07/27/2022     K 3.7 07/27/2022      07/27/2022     CO2 21 (L) 07/27/2022     BUN 14 07/27/2022     CREATININE 0.6 07/27/2022     GLUCOSE 99 07/27/2022     CALCIUM 9.1 07/27/2022     PROT 6.9 07/27/2022     LABALBU 4.4 07/27/2022     BILITOT <0.2 07/27/2022     ALKPHOS 73 07/27/2022     AST 11 07/27/2022     ALT 8 07/27/2022     LABGLOM >60 07/27/2022     GFRAA >59 07/27/2022            Lab Results   Component Value Date/Time      07/27/2022 08:16 AM     K 3.7 07/27/2022 08:16 AM      07/27/2022 08:16 AM     CO2 21 07/27/2022 08:16 AM     BUN 14 07/27/2022 08:16 AM     CREATININE 0.6 07/27/2022 08:16 AM     GLUCOSE 99 07/27/2022 08:16 AM     CALCIUM 9.1 07/27/2022 08:16 AM      No results found for: CHOL  No results found for: TRIG  No results found for: HDL  No results found for: LDLCHOLESTEROL, LDLCALC  No results found for: LABVLDL, VLDL  No results found for: CHOLHDLRATIO  No results found for: LABA1C  No results found for: EAG  No results found for: TSHFT4, TSH  No results found for: VITD25     Assessments Administered:        Assessment:    1. Bipolar II disorder, mild, depressed, with anxious distress ([facility]    2. Generalized anxiety disorder with panic attacks    3. Chronic post-traumatic stress disorder (PTSD)    4. Insomnia due to other mental disorder                No evidence of acute suicidality, homicidality or psychosis observed today. Psychiatrically stable. Plan:  1. Continue current regimen. The risks, benefits, side effects, indications, contraindications, and adverse effects of the medications have been discussed. Yes.  2. The pt has verbalized understanding and has capacity to give informed consent. 3. The Nessa Pugh report has been reviewed according to [facility] regulations. 4. Supportive therapy offered. Patient will continue seeing her therapist.  5. Follow up:    4  mo   6. The patient has been advised to call with any problems. 7. Controlled substance Treatment Plan: NA  8. The above listed medications have been continued, modifications in meds and other orders/labs as follows:                 Encounter Medications    No orders of the defined types were placed in this encounter. No orders of the defined types were placed in this encounter. 9. Additional comments: Consider checking thyroid function and vitamin B12 and D levels         10. Over 50% of the total visit time of 31  minutes was spent on counseling and/or coordination of care of:                         1. Bipolar II disorder, mild, depressed, with anxious distress ([facility]    2. Generalized anxiety disorder with panic attacks    3. Chronic post-traumatic stress disorder (PTSD)    4.  Insomnia due to other mental disorder                Armando Tate MD

## 2022-12-09 NOTE — TELEPHONE ENCOUNTER
Requested Prescriptions     Pending Prescriptions Disp Refills    butalbital-APAP-caffeine -40 MG CAPS per capsule 40 capsule 1     Sig: Take 1 capsule by mouth every 6 hours as needed for Headaches or Migraine       Last Office Visit:  12/28/2021  Next Office Visit:  12/29/2022  Last Medication Refill:  8/4/2022 with 3 RF   Gilberto Longoria up to date:  12/9/2022    *RX updated to reflect   12/9/2022  fill date*

## 2022-12-10 RX ORDER — CLONAZEPAM 0.5 MG/1
0.5 TABLET ORAL 2 TIMES DAILY PRN
Qty: 60 TABLET | Refills: 1 | Status: SHIPPED | OUTPATIENT
Start: 2022-12-10 | End: 2023-01-09

## 2022-12-29 ENCOUNTER — OFFICE VISIT (OUTPATIENT)
Dept: NEUROLOGY | Age: 32
End: 2022-12-29
Payer: COMMERCIAL

## 2022-12-29 VITALS
HEIGHT: 65 IN | WEIGHT: 145 LBS | OXYGEN SATURATION: 98 % | SYSTOLIC BLOOD PRESSURE: 112 MMHG | BODY MASS INDEX: 24.16 KG/M2 | DIASTOLIC BLOOD PRESSURE: 64 MMHG | HEART RATE: 77 BPM

## 2022-12-29 DIAGNOSIS — G89.29 CHRONIC BILATERAL LOW BACK PAIN WITH BILATERAL SCIATICA: ICD-10-CM

## 2022-12-29 DIAGNOSIS — G43.019 INTRACTABLE MIGRAINE WITHOUT AURA AND WITHOUT STATUS MIGRAINOSUS: Primary | ICD-10-CM

## 2022-12-29 DIAGNOSIS — M54.41 CHRONIC BILATERAL LOW BACK PAIN WITH BILATERAL SCIATICA: ICD-10-CM

## 2022-12-29 DIAGNOSIS — G44.221 CHRONIC TENSION-TYPE HEADACHE, INTRACTABLE: ICD-10-CM

## 2022-12-29 DIAGNOSIS — M54.42 CHRONIC BILATERAL LOW BACK PAIN WITH BILATERAL SCIATICA: ICD-10-CM

## 2022-12-29 PROCEDURE — 99213 OFFICE O/P EST LOW 20 MIN: CPT | Performed by: PSYCHIATRY & NEUROLOGY

## 2022-12-29 NOTE — PROGRESS NOTES
[facility]  [address] [place] [address] [address], [address]  [place], [place] [address]  Phone [phone]  Fax [phone]     [facility] Follow Up Encounter  22 10:10 AM CST    Information:   Patient Name: Peg Baca  :   1990  Age:   34 y.o. MRN:   [id number]  Account #:  [de-identified]  Today:  22    Provider: John Landa M.D. Chief Complaint:   Chief Complaint   Patient presents with    1 Year Follow Up    Migraine       Subjective:   Peg Baca is a 34 y.o. woman with a history of chronic tension headaches and intractable migraine headaches who is following up. She remains on Topamax and Aimovig. Her headaches are about the same. She has dull bifrontal headaches at some point most every day. These are often briefly noted and not bad enough for her to take analgesics. She gets a more severe tension headache or migraine about twice a week. Triptans have not helped in the past.  She does take Fioricet, about 10 a month. It does help. She does have intermittent back pain that radiated down the bilateral proximal legs. Objective:     Past Medical History:  Past Medical History:   Diagnosis Date    Anxiety     Bipolar 2 disorder ([facility] 2021    DDD (degenerative disc disease), lumbosacral 2022    Headache(784.0)     Headache, paroxysmal hemicrania, episodic     History of posttraumatic stress disorder (PTSD) 2020    Kidney stone     Migraine     Tension headache        Past Surgical History:   Procedure Laterality Date    ADENOIDECTOMY      CHOLECYSTECTOMY, LAPAROSCOPIC  2012    MYRINGOTOMY AND TYMPANOSTOMY TUBE PLACEMENT      TUBAL LIGATION         Recent Hospitalizations  None    Significant Injuries  None    Habits  Peg Baca reports that she has been smoking cigarettes. She started smoking about 18 years ago. She has been smoking an average of .5 packs per day. She has never used smokeless tobacco. She reports current alcohol use.  She reports that she does not use drugs. 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    Medications:  Current Outpatient Medications   Medication Sig Dispense Refill    clonazePAM (KLONOPIN) 0.5 MG tablet Take 1 tablet by mouth 2 times daily as needed for Anxiety for up to 30 days. 60 tablet 1    butalbital-APAP-caffeine -40 MG CAPS per capsule Take 1 capsule by mouth every 6 hours as needed for Headaches or Migraine 40 capsule 1    tranexamic acid (LYSTEDA) 650 MG TABS tablet Take 2 tablets by mouth 3 times daily 30 tablet 5    QUEtiapine (SEROQUEL) 100 MG tablet Take 1.5 tablets by mouth nightly 45 tablet 3    topiramate (TOPAMAX) 100 MG tablet TAKE 1 TABLET BY MOUTH TWO TIMES A  tablet 3    buPROPion (WELLBUTRIN XL) 150 MG extended release tablet TAKE 1 TABLET BY MOUTH ONE TIME A DAY IN THE MORNING 90 tablet 3    lamoTRIgine (LAMICTAL) 200 MG tablet Take 1 tablet by mouth in the morning. 90 tablet 3    AIMOVIG 140 MG/ML SOAJ INJECT 140MG UNDER THE SKIN ONCE MONTHLY 3 mL 3     No current facility-administered medications for this visit. Allergies:   Allergies as of 12/29/2022 - Fully Reviewed 12/29/2022   Allergen Reaction Noted    Morphine Other (See Comments) 05/18/2017    Salonpas [camphor-menthol-methyl sal]  11/12/2021       Review of Systems:  Constitutional: negative for - chills and fever  Eyes:  negative for - visual disturbance and photophobia  HENMT: positive for - headaches and sinus pain  Respiratory: negative for - cough, hemoptysis, and shortness of breath  Cardiovascular: negative for - chest pain and palpitations  Gastrointestinal: negative for - blood in stools, constipation, diarrhea, nausea, and vomiting  Genito-Urinary: negative for - hematuria, urinary frequency, urinary urgency, and urinary retention  Musculoskeletal: positive for - joint pain, joint stiffness, and joint swelling  Hematological and Lymphatic: negative for - bleeding problems, abnormal bruising, and swollen lymph nodes  Endocrine:  negative for - polydipsia and polyphagia  Allergy/Immunology:  negative for - rhinorrhea, sinus congestion, hives  Integument:  negative for - negative for - rash, change in moles, new or changing lesions  Psychological: negative for - anxiety and depression  Neurological: negative for - memory loss, numbness/tingling, and weakness     PHYSICAL EXAMINATION:  Vitals:  /64   Pulse 77   Ht 5' 5\" (1.651 m)   Wt 145 lb (65.8 kg)   SpO2 98%   BMI 24.13 kg/m²   General appearance:  Alert, well developed, well nourished, in no distress  HEENT:  normocephalic, atraumatic, sclera appear normal, no nasal abnormalities, no rhinorrhea, Ears appear normal, oral mucous membranes are moist without erythema, trachea midline, thyroid is normal, no lymphadenopathy or neck mass. Cardiovascular:  Regular rate and rhythm without murmer. No peripheral edema, No cyanosis or clubbing. No carotid bruits. Pulmonary:  Lungs are clear to auscultation. Breathing appears normal, good expansion, normal effort without use of accessory muscles  Musculoskeletal:  Joints are normal.    Integument:  No rash, erythema, or pallor  Psychiatric:  Mood, affect, and behavior appear normal      NEUROLOGIC EXAMINATION:  Mental Status:  alert, oriented to person, place, and time.   Speech:  Clear without dysarthria or dysphonia  Language:  Fluent without aphasia  Cranial Nerves:   II Visual fields are full to confrontation   III,IV, VI Extraocular movements are full   VII Facial movements are symmetrical without weakness   VIII Hearing is intact   IX,X Shoulder shrug and head rotation strength are intact   XII No tongue atrophy or fasciculations. Normal tongue protrusion. No tongue weakness  Motor:  Normal strength in both upper and lower extremities. Normal muscle tone and bulk. Deep tendon reflexes are intact and symmetrical in both upper and lower extremities. Martin's signs are absent bilaterally. There is no ankle clonus on either side. Sensation:  Sensation is intact to light touch, temperature, and vibration in all extremities. Coordination:  Rapid alternating movements are normal in both upper and lower extremities. Finger to nose testing is unimpaired bilaterally. Gait:  Normal station and gait. Pertinent Diagnostic Studies:  CLAUDE is appropriate    Assessment:       ICD-10-CM    1. Intractable migraine without aura and without status migrainosus  G43.019       2. Chronic tension-type headache, intractable  G44.221       3. Chronic bilateral low back pain with bilateral sciatica  M54.42     M54.41     G89.29       Her headaches are stable. Plan:   1. Continue Aimovig, Topamax, Fioricet  2. Follow up here in a year.     Electronically signed by Mary Kong MD on 12/29/22

## 2023-01-03 ENCOUNTER — OFFICE VISIT (OUTPATIENT)
Dept: PRIMARY CARE CLINIC | Age: 33
End: 2023-01-03
Payer: COMMERCIAL

## 2023-01-03 VITALS
HEIGHT: 65 IN | WEIGHT: 146 LBS | OXYGEN SATURATION: 98 % | TEMPERATURE: 98 F | BODY MASS INDEX: 24.32 KG/M2 | HEART RATE: 85 BPM | DIASTOLIC BLOOD PRESSURE: 62 MMHG | RESPIRATION RATE: 16 BRPM | SYSTOLIC BLOOD PRESSURE: 94 MMHG

## 2023-01-03 DIAGNOSIS — M51.37 DDD (DEGENERATIVE DISC DISEASE), LUMBOSACRAL: Primary | ICD-10-CM

## 2023-01-03 DIAGNOSIS — F31.81 BIPOLAR 2 DISORDER (HCC): ICD-10-CM
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
## 2023-01-03 DIAGNOSIS — M26.643 ARTHRITIS OF BOTH TEMPOROMANDIBULAR JOINTS: ICD-10-CM

## 2023-01-03 PROCEDURE — 99214 OFFICE O/P EST MOD 30 MIN: CPT | Performed by: FAMILY MEDICINE

## 2023-01-03 RX ORDER — GABAPENTIN 100 MG/1
100 CAPSULE ORAL DAILY PRN
Qty: 30 CAPSULE | Refills: 0 | Status: SHIPPED | OUTPATIENT
Start: 2023-01-03 | End: 2023-02-02

## 2023-01-03 RX ORDER — DICLOFENAC SODIUM 75 MG/1
75 TABLET, DELAYED RELEASE ORAL 2 TIMES DAILY
COMMUNITY

## 2023-01-03 NOTE — PROGRESS NOTES
[address] [facility]  [address] [address] [place] [address]  Dept: [phone]  Dept Fax: [phone]  Loc: [phone]    Subjective:     Sissy Schneider is a 34 y.o. female who presents today for her medical conditions/complaints as noted below. Sissy Schneider is c/o of Follow-up and Back Pain        HPI:   Patient presents for follow-up of degenerative disc disease and TMJ. She states that she fell recently which aggravated her back pain. She takes Voltaren p.o. with some relief. She is requesting to start gabapentin if possible. Psychiatry notes reviewed. PHQ Scores 9/26/2022 9/21/2022 8/2/2022 8/1/2022 6/30/2022 5/18/2022 2/28/2022   PHQ2 Score 3 2 4 4 3 1 1   PHQ9 Score 7 5 16 10 8 4 5     Interpretation of Total Score Depression Severity: 1-4 = Minimal depression, 5-9 = Mild depression, 10-14 = Moderate depression, 15-19 = Moderately severe depression, 20-27 = Severe depression     BRETT 7 SCORE 9/26/2022 8/1/2022 6/30/2022 2/28/2022 12/7/2021 11/9/2021 10/11/2021   BRETT-7 Total Score 6 11 5 2 2 11 5     Interpretation of BRETT-7 score: 5-9 = mild anxiety, 10-14 = moderate anxiety, 15+ = severe anxiety. Recommend referral to behavioral health for scores 10 or greater.      Past Medical History:   Diagnosis Date    Anxiety     Bipolar 2 disorder ([facility] 01/25/2021    DDD (degenerative disc disease), lumbosacral 01/05/2022    Headache(784.0)     Headache, paroxysmal hemicrania, episodic     History of posttraumatic stress disorder (PTSD) 12/01/2020    Kidney stone     Migraine     Tension headache      Past Surgical History:   Procedure Laterality Date    ADENOIDECTOMY      CHOLECYSTECTOMY, LAPAROSCOPIC  11/05/2012    MYRINGOTOMY AND TYMPANOSTOMY TUBE PLACEMENT      TUBAL LIGATION         Family History   Problem Relation Age of Onset    Kidney stones Mother     Hypertension Father     Cancer Maternal Grandmother         cervical    Heart Failure Maternal Grandmother     Diabetes Paternal Grandfather     High Blood Pressure Paternal Grandfather     Diabetes Maternal Aunt     Cancer Maternal Cousin         cervical    Cancer Maternal Cousin         cervical       Social History     Tobacco Use    Smoking status: Every Day     Packs/day: 0.50     Types: Cigarettes     Start date: 2005    Smokeless tobacco: Never   Substance Use Topics    Alcohol use: Yes     Comment: rarely      Current Outpatient Medications   Medication Sig Dispense Refill    diclofenac (VOLTAREN) 75 MG EC tablet Take 75 mg by mouth 2 times daily      gabapentin (NEURONTIN) 100 MG capsule Take 1 capsule by mouth daily as needed (pain) for up to 30 days. Max Daily Amount: 100 mg 30 capsule 0    butalbital-APAP-caffeine -40 MG CAPS per capsule Take 1 capsule by mouth every 6 hours as needed for Headaches or Migraine 40 capsule 1    tranexamic acid (LYSTEDA) 650 MG TABS tablet Take 2 tablets by mouth 3 times daily 30 tablet 5    QUEtiapine (SEROQUEL) 100 MG tablet Take 1.5 tablets by mouth nightly 45 tablet 3    topiramate (TOPAMAX) 100 MG tablet TAKE 1 TABLET BY MOUTH TWO TIMES A  tablet 3    buPROPion (WELLBUTRIN XL) 150 MG extended release tablet TAKE 1 TABLET BY MOUTH ONE TIME A DAY IN THE MORNING 90 tablet 3    lamoTRIgine (LAMICTAL) 200 MG tablet Take 1 tablet by mouth in the morning. 90 tablet 3    AIMOVIG 140 MG/ML SOAJ INJECT 140MG UNDER THE SKIN ONCE MONTHLY 3 mL 3    clonazePAM (KLONOPIN) 0.5 MG tablet Take 1 tablet by mouth 2 times daily as needed for Anxiety for up to 30 days. (Patient not taking: Reported on 1/3/2023) 60 tablet 1     No current facility-administered medications for this visit. Allergies   Allergen Reactions    Morphine Other (See Comments)     Hallucinations->then headaches for about 4-5 days. Salonpas [Camphor-Menthol-Methyl [place]]      \"had severe burn\"       Review of Systems   Constitutional:  Negative for chills and fever.    HENT:  Negative for facial swelling and mouth sores. Eyes:  Negative for discharge and itching. Respiratory:  Negative for apnea and stridor. Cardiovascular:  Negative for chest pain and palpitations. Gastrointestinal:  Negative for nausea and vomiting. Endocrine: Negative for cold intolerance and heat intolerance. Genitourinary:  Negative for frequency and urgency. Musculoskeletal:  Negative for arthralgias and back pain. Skin:  Negative for color change and rash. See HPI for visit specific review of symptoms. All others negative      Objective:   BP 94/62   Pulse 85   Temp 98 °F (36.7 °C) (Temporal)   Resp 16   Ht 5' 5\" (1.651 m)   Wt 146 lb (66.2 kg)   SpO2 98%   BMI 24.30 kg/m²   Physical Exam  Constitutional:       Appearance: She is well-developed. HENT:      Head: Normocephalic and atraumatic. Right Ear: External ear normal.      Left Ear: External ear normal.   Eyes:      Conjunctiva/sclera: Conjunctivae normal.      Pupils: Pupils are equal, round, and reactive to light. Cardiovascular:      Rate and Rhythm: Normal rate and regular rhythm. Heart sounds: Normal heart sounds. Pulmonary:      Effort: Pulmonary effort is normal. No respiratory distress. Breath sounds: Normal breath sounds. Abdominal:      General: Bowel sounds are normal. There is no distension. Palpations: Abdomen is soft. Tenderness: There is no abdominal tenderness. Musculoskeletal:         General: Normal range of motion. Cervical back: Normal range of motion and neck supple. Skin:     General: Skin is warm. Capillary Refill: Capillary refill takes less than 2 seconds. Findings: No rash. Neurological:      Mental Status: She is alert and oriented to person, place, and time. Cranial Nerves: No cranial nerve deficit. Psychiatric:         Thought Content:  Thought content normal.         Lab Review   No results found for this or any previous visit (from the past 672 hour(s)). Assessment & Plan: The following diagnoses and conditions are stable with no further orders unless indicated:    Patient Active Problem List    Diagnosis Date Noted    Eustachian tube dysfunction, left 07/20/2022     Priority: Medium    DDD (degenerative disc disease), lumbosacral 01/05/2022     Overview Note:     Worsening, will do trial Mobic, defer imaging at this time. Continue PT/HEP/Flexeril. Switch to oral diclofenac. She has tried Voltaren gel in the past also, ineffective. Arthritis of both temporomandibular joints 05/09/2021     Overview Note:     Discussed preventive measures, info given. Encouraged her to get nightguard, she is considering a sports mouthguard which is not optimal but reasonable to try. Flexeril ineffective, will switch to Zanaflex. Dermatitis 05/09/2021     Overview Note:     Trial Kenalog for possible eczema      Ovarian cyst 05/03/2021    Bipolar 2 disorder ([facility] [address] 01/25/2021    Chronic tension-type headache, intractable 11/19/2018    Chronic paroxysmal hemicrania, intractable 05/03/2018    Migraine         Artelia Corporal was seen today for follow-up and back pain. Diagnoses and all orders for this visit:    DDD (degenerative disc disease), lumbosacral  -     gabapentin (NEURONTIN) 100 MG capsule; Take 1 capsule by mouth daily as needed (pain) for up to 30 days. Max Daily Amount: 100 mg    Bipolar 2 disorder (HCC)    Arthritis of both temporomandibular joints    Trial gabapentin for 30 days. If effective, will do UDS etc. at next appointment. She voiced understanding.     Health Maintenance   Topic Date Due    Varicella vaccine (1 of 2 - 2-dose childhood series) Never done    Pneumococcal 0-64 years Vaccine (1 - PCV) Never done    COVID-19 Vaccine (3 - Booster for Moderna series) 01/21/2022    Flu vaccine (1) 08/01/2022    Depression Monitoring  09/26/2023    Cervical cancer screen  05/26/2026    DTaP/Tdap/Td vaccine (2 - Td or Tdap) 12/03/2028 Hepatitis A vaccine  Aged Out    Hib vaccine  Aged Out    Meningococcal (ACWY) vaccine  Aged Out    Hepatitis C screen  Discontinued    HIV screen  Discontinued         Return in about 1 month (around 2/3/2023) for back pain, Gabapentin, in office. Discussed use, benefit, and side effects of prescribed medications. All patient questions answered. Pt voiced understanding. Reviewed health maintenance. Instructed to continue current medications, diet and exercise. Patient agreedwith treatment plan. Follow up as directed. Old records reviewed, where available.     Leonidas Hale MD    Note:  dictated using Dragon software

## 2023-01-04 ENCOUNTER — TELEPHONE (OUTPATIENT)
Dept: PSYCHIATRY | Age: 33
End: 2023-01-04

## 2023-01-04 NOTE — TELEPHONE ENCOUNTER
Called pt to cancel/reschedule appt for 01/17/23 with Dr. Reno Gant because the provider will not be in the office that day. Rescheduled for 01/09/23 @ 10.     Electronically signed by Sonia Sutherland MA on 1/4/2023 at 12:45 PM

## 2023-01-06 ENCOUNTER — TELEPHONE (OUTPATIENT)
Dept: PSYCHIATRY | Age: 33
End: 2023-01-06

## 2023-01-06 RX ORDER — QUETIAPINE FUMARATE 100 MG/1
150 TABLET, FILM COATED ORAL NIGHTLY
Qty: 45 TABLET | Refills: 3 | Status: SHIPPED | OUTPATIENT
Start: 2023-01-06 | End: 2023-02-05

## 2023-01-06 NOTE — TELEPHONE ENCOUNTER
Pharmacy sent a request to refill pt's medication       Last office visit : 9/21/2022 Jesusita Stoner MD  Next office visit : 1/9/2023 Jesusita Stoner MD    Requested Prescriptions     Pending Prescriptions Disp Refills    QUEtiapine (SEROQUEL) 100 MG tablet 45 tablet 3     Sig: Take 1.5 tablets by mouth nightly            Jonathan Gao           9/21/2022 1:07 PM                             Progress Note           [facility] [place] 1990                             Chief Complaint   Patient presents with    Medication Check                  Subjective:    28-year-old white female with history of bipolar disorder, anxiety, panic attacks, presents for follow-up. Currently kept on Lamictal, Wellbutrin, Seroquel - increased last time. Added Klonopin last time. Patient reports compliance. No SEs. Bright affect today. Smiling. Denies SI. Sleeping well. Good family support. Going on vacation soon. Current Substance Use: Never had any issues with a stimulant, opioid or benzodiazepine abuse. Drank too much in the past.  Currently avoiding alcohol. Smokes cigarettes.       BP: BP 94/67   Pulse 86   Temp 97.8 °F (36.6 °C)   Ht 5' 5\" (1.651 m)   Wt 139 lb 9.6 oz (63.3 kg)   SpO2 100%   BMI 23.23 kg/m²         Review of Systems - 14 point review:  All negative:      Constitutional: (fevers, chills, night sweats, wt loss/gain, change in appetite, fatigue, somnolence)     HEENT: (ear pain or discharge, hearing loss, ear ringing, sinus pressure, nosebleed, nasal discharge, sore throat, oral sores, tooth pain, bleeding gums, hoarse voice, neck pain)      Cardiovascular: (HTN, chest pain, elevated cholesterol/lipids, palpitations, leg swelling, leg pain with walking)     Respiratory: (cough, wheezing, snoring, SOB with activity (dyspnea), SOB while lying flat (orthopnea), awakening with severe SOB (paroxysmal nocturnal dyspnea))     Gastrointestinal: (NVD, constipation, abdominal pain, bright red stools, black tarry stools, stool incontinence)     Genitourinary:  (pelvic pain, burning or frequency of urination, urinary urgency, blood in urine incomplete bladder emptying, urinary incontinence, STD; MEN: testicular pain or swelling, erectile dysfunction; WOMEN: LMP, heavy menstrual bleeding (menorrhagia), irregular periods, postmenopausal bleeding, menstrual pain (dymenorrhea, vaginal discharge)     Musculoskeletal: (bone pain/fracture, joint pain or swelling, musle pain)     Integumentary: (rashes, acne, non-healing sores, itching, breast lumps, breast pain, nipple discharge, hair loss)     Neurologic: (HA, muscle weakness, paresthesias (numbness, coldness, crawling or prickling), memory loss, seizure, dizziness)     Endocrine: (heat or cold intolerance, excessive thirst (polydipsia), excessive hunger (polyphagia))     Immune/Allergic: (hives, seasonal or environmental allergies, HIV exposure)     Hematologic/Lymphatic: (lymph node enlargement, easy bleeding or bruising)     History obtained via chart review and patient     PCP is Niko Linares MD         Current Meds:     Home Medications           Prior to Admission medications    Medication Sig Start Date End Date Taking? Authorizing Provider   diclofenac (VOLTAREN) 75 MG EC tablet Take 1 tablet by mouth 2 times daily as needed for Pain 9/21/22     Venecia Leong MD   butalbital-APAP-caffeine -40 MG CAPS per capsule Take 1 capsule by mouth every 6 hours as needed for Headaches or Migraine 8/19/22 12/17/22   uSnni Hernandez MD   buPROPion (WELLBUTRIN XL) 150 MG extended release tablet TAKE 1 TABLET BY MOUTH ONE TIME A DAY IN THE MORNING 8/4/22     Khadijah Ulrich MD   lamoTRIgine (LAMICTAL) 200 MG tablet Take 1 tablet by mouth in the morning.  8/4/22 11/2/22   Khadijah Ulrich MD   QUEtiapine (SEROQUEL) 100 MG tablet Take 1.5 tablets by mouth nightly 8/2/22 9/21/22   Khadijah Ulrich MD   clonazePAM (KLONOPIN) 0.5 MG tablet Take 1 tablet by mouth 2 times daily as needed for Anxiety for up to 30 days. 8/2/22 9/21/22   Kalpesh Broyd MD   tranexamic acid (LYSTEDA) 650 MG TABS tablet Take 2 tablets by mouth 3 times daily 5/31/22     Inmiguel  APRSANDRA   topiramate (TOPAMAX) 100 MG tablet TAKE 1 TABLET BY MOUTH TWO TIMES A DAY 4/26/22     Luis Alberto Baum MD   AIMOVIG 140 MG/ML SOAJ INJECT 140MG UNDER THE SKIN ONCE MONTHLY 3/29/22     Luis Alberto Baum MD            MSE:  Appearance: Appropriately groomed. Made good eye contact. Behavior: Bright  Speech: Normal in tone, volume, and quality. No slurring, dysarthria or pressured speech noted. Mood: \"Better\"   Affect: Mood congruent. Thought Process: Appears linear, logical and goal oriented. Causality appears intact. Thought Content: Denies active suicidal and homicidal ideations. No overt delusions or paranoia appreciated. Perceptions: Denies auditory or visual hallucinations at present time. Not responding to internal stimuli. Concentration: Intact. Orientation: to person, place, date, and situation. Language: Intact. Fund of information: Intact. Memory: Recent and remote appear intact. Impulsivity: Limited. Neurovegitative: Fair appetite and sleep. Insight: Fair. Judgment: Fair.               Lab Results   Component Value Date      07/27/2022     K 3.7 07/27/2022      07/27/2022     CO2 21 (L) 07/27/2022     BUN 14 07/27/2022     CREATININE 0.6 07/27/2022     GLUCOSE 99 07/27/2022     CALCIUM 9.1 07/27/2022     PROT 6.9 07/27/2022     LABALBU 4.4 07/27/2022     BILITOT <0.2 07/27/2022     ALKPHOS 73 07/27/2022     AST 11 07/27/2022     ALT 8 07/27/2022     LABGLOM >60 07/27/2022     GFRAA >59 07/27/2022            Lab Results   Component Value Date/Time      07/27/2022 08:16 AM     K 3.7 07/27/2022 08:16 AM      07/27/2022 08:16 AM     CO2 21 07/27/2022 08:16 AM     BUN 14 07/27/2022 08:16 AM     CREATININE 0.6 07/27/2022 08:16 AM     GLUCOSE 99 07/27/2022 08:16 AM     CALCIUM 9.1 07/27/2022 08:16 AM      No results found for: CHOL  No results found for: TRIG  No results found for: HDL  No results found for: LDLCHOLESTEROL, LDLCALC  No results found for: LABVLDL, VLDL  No results found for: CHOLHDLRATIO  No results found for: LABA1C  No results found for: EAG  No results found for: TSHFT4, TSH  No results found for: VITD25     Assessments Administered:        Assessment:    1. Bipolar II disorder, mild, depressed, with anxious distress ([facility]    2. Generalized anxiety disorder with panic attacks    3. Chronic post-traumatic stress disorder (PTSD)    4. Insomnia due to other mental disorder                No evidence of acute suicidality, homicidality or psychosis observed today. Psychiatrically stable. Plan:  1. Continue current regimen. The risks, benefits, side effects, indications, contraindications, and adverse effects of the medications have been discussed. Yes.  2. The pt has verbalized understanding and has capacity to give informed consent. 3. The Albert Carter report has been reviewed according to [facility] regulations. 4. Supportive therapy offered. Patient will continue seeing her therapist.  5. Follow up:    4  mo   6. The patient has been advised to call with any problems. 7. Controlled substance Treatment Plan: NA  8. The above listed medications have been continued, modifications in meds and other orders/labs as follows:                 Encounter Medications    No orders of the defined types were placed in this encounter. No orders of the defined types were placed in this encounter. 9. Additional comments: Consider checking thyroid function and vitamin B12 and D levels         10. Over 50% of the total visit time of 31  minutes was spent on counseling and/or coordination of care of:                         1. Bipolar II disorder, mild, depressed, with anxious distress ([facility]    2. Generalized anxiety disorder with panic attacks    3.  Chronic post-traumatic stress disorder (PTSD)    4.  Insomnia due to other mental disorder                Tamiko Barros MD

## 2023-01-06 NOTE — TELEPHONE ENCOUNTER
Called and confirmed appt with pt for 1/9 @ 10 AM    Electronically signed by Bernadine Guo on 1/6/2023 at 11:15 AM

## 2023-01-09 ENCOUNTER — OFFICE VISIT (OUTPATIENT)
Dept: PSYCHIATRY | Age: 33
End: 2023-01-09

## 2023-01-09 VITALS
HEART RATE: 86 BPM | SYSTOLIC BLOOD PRESSURE: 94 MMHG | WEIGHT: 135.7 LBS | BODY MASS INDEX: 22.61 KG/M2 | DIASTOLIC BLOOD PRESSURE: 61 MMHG | OXYGEN SATURATION: 98 % | HEIGHT: 65 IN | TEMPERATURE: 98.2 F

## 2023-01-09 DIAGNOSIS — F41.0 GENERALIZED ANXIETY DISORDER WITH PANIC ATTACKS: ICD-10-CM

## 2023-01-09 DIAGNOSIS — F51.05 INSOMNIA DUE TO OTHER MENTAL DISORDER: ICD-10-CM

## 2023-01-09 DIAGNOSIS — F31.81 BIPOLAR II DISORDER, MILD, DEPRESSED, WITH ANXIOUS DISTRESS (HCC): Primary | ICD-10-CM

## 2023-01-09 DIAGNOSIS — F99 INSOMNIA DUE TO OTHER MENTAL DISORDER: ICD-10-CM

## 2023-01-09 DIAGNOSIS — F43.12 CHRONIC POST-TRAUMATIC STRESS DISORDER (PTSD): ICD-10-CM

## 2023-01-09 DIAGNOSIS — F41.1 GENERALIZED ANXIETY DISORDER WITH PANIC ATTACKS: ICD-10-CM

## 2023-01-09 RX ORDER — BUPROPION HYDROCHLORIDE 300 MG/1
TABLET ORAL
Qty: 90 TABLET | Refills: 3 | Status: SHIPPED | OUTPATIENT
Start: 2023-01-09

## 2023-01-09 RX ORDER — LAMOTRIGINE 25 MG/1
50 TABLET ORAL DAILY
Qty: 180 TABLET | Refills: 3 | Status: SHIPPED | OUTPATIENT
Start: 2023-01-09 | End: 2023-04-09

## 2023-01-09 ASSESSMENT — PATIENT HEALTH QUESTIONNAIRE - PHQ9
8. MOVING OR SPEAKING SO SLOWLY THAT OTHER PEOPLE COULD HAVE NOTICED. OR THE OPPOSITE, BEING SO FIGETY OR RESTLESS THAT YOU HAVE BEEN MOVING AROUND A LOT MORE THAN USUAL: 0
3. TROUBLE FALLING OR STAYING ASLEEP: 2
SUM OF ALL RESPONSES TO PHQ QUESTIONS 1-9: 9
2. FEELING DOWN, DEPRESSED OR HOPELESS: 1
9. THOUGHTS THAT YOU WOULD BE BETTER OFF DEAD, OR OF HURTING YOURSELF: 0
SUM OF ALL RESPONSES TO PHQ QUESTIONS 1-9: 9
SUM OF ALL RESPONSES TO PHQ9 QUESTIONS 1 & 2: 2
SUM OF ALL RESPONSES TO PHQ QUESTIONS 1-9: 9
1. LITTLE INTEREST OR PLEASURE IN DOING THINGS: 1
10. IF YOU CHECKED OFF ANY PROBLEMS, HOW DIFFICULT HAVE THESE PROBLEMS MADE IT FOR YOU TO DO YOUR WORK, TAKE CARE OF THINGS AT HOME, OR GET ALONG WITH OTHER PEOPLE: 1
4. FEELING TIRED OR HAVING LITTLE ENERGY: 1
6. FEELING BAD ABOUT YOURSELF - OR THAT YOU ARE A FAILURE OR HAVE LET YOURSELF OR YOUR FAMILY DOWN: 1
7. TROUBLE CONCENTRATING ON THINGS, SUCH AS READING THE NEWSPAPER OR WATCHING TELEVISION: 1
5. POOR APPETITE OR OVEREATING: 2
SUM OF ALL RESPONSES TO PHQ QUESTIONS 1-9: 9

## 2023-01-09 ASSESSMENT — ENCOUNTER SYMPTOMS
STRIDOR: 0
BACK PAIN: 0
EYE ITCHING: 0
COLOR CHANGE: 0
APNEA: 0
VOMITING: 0
EYE DISCHARGE: 0
FACIAL SWELLING: 0
NAUSEA: 0

## 2023-01-09 NOTE — PROGRESS NOTES
1/9/2023 10:28 AM   Progress Note        Kesha Loyd 1990      Chief Complaint   Patient presents with    Medication Check           Subjective:    79-year-old white female with history of bipolar disorder, anxiety, panic attacks, presents for follow-up. Currently kept on Lamictal, Wellbutrin, Seroquel, Klonopin PRN. Patient reports compliance. No SEs. Bright affect today. Smiling. More mood swings. Feels meds are not working well. Denies SI. Sleeping well. Good family support. Had good holidays. Some stress with mom and ex. Current Substance Use: Never had any issues with a stimulant, opioid or benzodiazepine abuse. Drank too much in the past.  Currently avoiding alcohol. Smokes cigarettes.      BP: BP 94/61   Pulse 86   Temp 98.2 °F (36.8 °C)   Ht 5' 5\" (1.651 m)   Wt 135 lb 11.2 oz (61.6 kg)   SpO2 98%   BMI 22.58 kg/m²       Review of Systems - 14 point review:  All negative:     Constitutional: (fevers, chills, night sweats, wt loss/gain, change in appetite, fatigue, somnolence)    HEENT: (ear pain or discharge, hearing loss, ear ringing, sinus pressure, nosebleed, nasal discharge, sore throat, oral sores, tooth pain, bleeding gums, hoarse voice, neck pain)      Cardiovascular: (HTN, chest pain, elevated cholesterol/lipids, palpitations, leg swelling, leg pain with walking)    Respiratory: (cough, wheezing, snoring, SOB with activity (dyspnea), SOB while lying flat (orthopnea), awakening with severe SOB (paroxysmal nocturnal dyspnea))    Gastrointestinal: (NVD, constipation, abdominal pain, bright red stools, black tarry stools, stool incontinence)     Genitourinary:  (pelvic pain, burning or frequency of urination, urinary urgency, blood in urine incomplete bladder emptying, urinary incontinence, STD; MEN: testicular pain or swelling, erectile dysfunction; WOMEN: LMP, heavy menstrual bleeding (menorrhagia), irregular periods, postmenopausal bleeding, menstrual pain (dymenorrhea, vaginal discharge)    Musculoskeletal: (bone pain/fracture, joint pain or swelling, musle pain)    Integumentary: (rashes, acne, non-healing sores, itching, breast lumps, breast pain, nipple discharge, hair loss)    Neurologic: (HA, muscle weakness, paresthesias (numbness, coldness, crawling or prickling), memory loss, seizure, dizziness)    Endocrine: (heat or cold intolerance, excessive thirst (polydipsia), excessive hunger (polyphagia))    Immune/Allergic: (hives, seasonal or environmental allergies, HIV exposure)    Hematologic/Lymphatic: (lymph node enlargement, easy bleeding or bruising)    History obtained via chart review and patient    PCP is Ashley Gomez MD       Current Meds:    Prior to Admission medications    Medication Sig Start Date End Date Taking? Authorizing Provider   QUEtiapine (SEROQUEL) 100 MG tablet Take 1.5 tablets by mouth nightly 1/6/23 2/5/23  Natalie Alves MD   diclofenac (VOLTAREN) 75 MG EC tablet Take 75 mg by mouth 2 times daily    Historical Provider, MD   gabapentin (NEURONTIN) 100 MG capsule Take 1 capsule by mouth daily as needed (pain) for up to 30 days. Max Daily Amount: 100 mg 1/3/23 2/2/23  Aviva Palacio MD   clonazePAM (KLONOPIN) 0.5 MG tablet Take 1 tablet by mouth 2 times daily as needed for Anxiety for up to 30 days.   Patient not taking: Reported on 1/3/2023 12/10/22 1/9/23  Natalie Alves MD   butalbital-APAP-caffeine -40 MG CAPS per capsule Take 1 capsule by mouth every 6 hours as needed for Headaches or Migraine 12/9/22 4/8/23  Judith Garcia MD   tranexamic acid (LYSTEDA) 650 MG TABS tablet Take 2 tablets by mouth 3 times daily 11/28/22   CHRIS Wilson   topiramate (TOPAMAX) 100 MG tablet TAKE 1 TABLET BY MOUTH TWO TIMES A DAY 10/27/22   Judith Garcia MD   buPROPion (WELLBUTRIN XL) 150 MG extended release tablet TAKE 1 TABLET BY MOUTH ONE TIME A DAY IN THE MORNING 8/4/22   Natalie Alves MD   lamoTRIgine (LAMICTAL) 200 MG tablet Take 1 tablet by mouth in the morning. 8/4/22 1/3/23  Bashir Chirinos MD   AIMOVIG 140 MG/ML SOAJ INJECT 140MG UNDER THE SKIN ONCE MONTHLY 3/29/22   Marlene Fabry, MD       MSE:  Appearance: Appropriately groomed. Made good eye contact. Behavior: Bright  Speech: Normal in tone, volume, and quality. No slurring, dysarthria or pressured speech noted. Mood: \"ok\"   Affect: Mood congruent. Thought Process: Appears linear, logical and goal oriented. Causality appears intact. Thought Content: Denies active suicidal and homicidal ideations. No overt delusions or paranoia appreciated. Perceptions: Denies auditory or visual hallucinations at present time. Not responding to internal stimuli. Concentration: Intact. Orientation: to person, place, date, and situation. Language: Intact. Fund of information: Intact. Memory: Recent and remote appear intact. Impulsivity: Limited. Neurovegitative: Fair appetite and sleep. Insight: Fair. Judgment: Fair.       Lab Results   Component Value Date     07/27/2022    K 3.7 07/27/2022     07/27/2022    CO2 21 (L) 07/27/2022    BUN 14 07/27/2022    CREATININE 0.6 07/27/2022    GLUCOSE 99 07/27/2022    CALCIUM 9.1 07/27/2022    PROT 6.9 07/27/2022    LABALBU 4.4 07/27/2022    BILITOT <0.2 07/27/2022    ALKPHOS 73 07/27/2022    AST 11 07/27/2022    ALT 8 07/27/2022    LABGLOM >60 07/27/2022    GFRAA >59 07/27/2022     Lab Results   Component Value Date/Time     07/27/2022 08:16 AM    K 3.7 07/27/2022 08:16 AM     07/27/2022 08:16 AM    CO2 21 07/27/2022 08:16 AM    BUN 14 07/27/2022 08:16 AM    CREATININE 0.6 07/27/2022 08:16 AM    GLUCOSE 99 07/27/2022 08:16 AM    CALCIUM 9.1 07/27/2022 08:16 AM      No results found for: CHOL  No results found for: TRIG  No results found for: HDL  No results found for: LDLCHOLESTEROL, LDLCALC  No results found for: LABVLDL, VLDL  No results found for: CHOLHDLRATIO  No results found for: LABA1C  No results found for: EAG  No results found for: TSHFT4, TSH  No results found for: VITD25    Assessments Administered:      Assessment:   1. Bipolar II disorder, mild, depressed, with anxious distress ([address]    2. Generalized anxiety disorder with panic attacks    3. Chronic post-traumatic stress disorder (PTSD)    4. Insomnia due to other mental disorder        No evidence of acute suicidality, homicidality or psychosis observed today. Psychiatrically stable. Plan:  1. Increase Lamictal and Wellbutrin for mood stabilization. Continue current regimen. The risks, benefits, side effects, indications, contraindications, and adverse effects of the medications have been discussed. Yes.  2. The pt has verbalized understanding and has capacity to give informed consent. 3. The [facility] report has been reviewed according to [facility] regulations. 4. Supportive therapy offered. Patient will continue seeing her therapist.  5. Follow up: 4  mo   6. The patient has been advised to call with any problems. 7. Controlled substance Treatment Plan: NA  8. The above listed medications have been continued, modifications in meds and other orders/labs as follows: No orders of the defined types were placed in this encounter. No orders of the defined types were placed in this encounter. 9. Additional comments: Consider checking thyroid function and vitamin B12 and D levels       10. Over 50% of the total visit time of 31  minutes was spent on counseling and/or coordination of care of:                        1. Bipolar II disorder, mild, depressed, with anxious distress ([facility] [address]    2. Generalized anxiety disorder with panic attacks    3. Chronic post-traumatic stress disorder (PTSD)    4.  Insomnia due to other mental disorder              Nimo Willoughby MD

## 2023-01-17 ENCOUNTER — HOSPITAL ENCOUNTER (OUTPATIENT)
Dept: GENERAL RADIOLOGY | Age: 33
Discharge: HOME | End: 2023-01-17
Payer: COMMERCIAL

## 2023-01-17 ENCOUNTER — OFFICE VISIT (OUTPATIENT)
Dept: UROLOGY | Age: 33
End: 2023-01-17
Payer: COMMERCIAL

## 2023-01-17 VITALS — WEIGHT: 137 LBS | BODY MASS INDEX: 22.82 KG/M2 | HEIGHT: 65 IN

## 2023-01-17 DIAGNOSIS — R30.0 DYSURIA: Primary | ICD-10-CM

## 2023-01-17 DIAGNOSIS — N20.0 BILATERAL NEPHROLITHIASIS: ICD-10-CM

## 2023-01-17 LAB
BACTERIA URINE, POC: NORMAL
BILIRUBIN URINE: 0 MG/DL
BLOOD, URINE: POSITIVE
CASTS URINE, POC: 0
CLARITY: CLEAR
COLOR: YELLOW
CRYSTALS URINE, POC: 0
EPI CELLS URINE, POC: NORMAL
GLUCOSE URINE: NORMAL
KETONES, URINE: NEGATIVE
LEUKOCYTE EST, POC: NORMAL
NITRITE, URINE: NEGATIVE
PH UA: 5 (ref 4.5–8)
PROTEIN UA: NEGATIVE
RBC URINE, POC: 0
SPECIFIC GRAVITY UA: 1.02 (ref 1–1.03)
UROBILINOGEN, URINE: NORMAL
WBC URINE, POC: 40
YEAST URINE, POC: 0

## 2023-01-17 PROCEDURE — 74018 RADEX ABDOMEN 1 VIEW: CPT

## 2023-01-17 PROCEDURE — 96372 THER/PROPH/DIAG INJ SC/IM: CPT | Performed by: NURSE PRACTITIONER

## 2023-01-17 PROCEDURE — 74018 RADEX ABDOMEN 1 VIEW: CPT | Performed by: RADIOLOGY

## 2023-01-17 PROCEDURE — 81001 URINALYSIS AUTO W/SCOPE: CPT | Performed by: NURSE PRACTITIONER

## 2023-01-17 PROCEDURE — 99214 OFFICE O/P EST MOD 30 MIN: CPT | Performed by: NURSE PRACTITIONER

## 2023-01-17 RX ORDER — CEFTRIAXONE SODIUM 250 MG/1
250 INJECTION, POWDER, FOR SOLUTION INTRAMUSCULAR; INTRAVENOUS ONCE
Status: COMPLETED | OUTPATIENT
Start: 2023-01-17 | End: 2023-01-17

## 2023-01-17 RX ADMIN — CEFTRIAXONE SODIUM 250 MG: 250 INJECTION, POWDER, FOR SOLUTION INTRAMUSCULAR; INTRAVENOUS at 15:50

## 2023-01-17 ASSESSMENT — ENCOUNTER SYMPTOMS
NAUSEA: 0
ABDOMINAL DISTENTION: 0
BACK PAIN: 0
VOMITING: 0
ABDOMINAL PAIN: 0

## 2023-01-17 NOTE — PROGRESS NOTES
After obtaining consent from patient and receiving verbal/written orders from CHRIS Hull, I gave patient 1g of CEFTRIAXONE injection in Right upper quad. gluteus, patient tolerated well. Medication was not supplied by the patient.

## 2023-01-17 NOTE — PROGRESS NOTES
Olegario Damian is a 34 y.o., female, Established patient who presents today   Chief Complaint   Patient presents with    Follow-up     I am here today for my 6 month stone follow up. HPI   Patient presents for follow-up of nephrolithiasis. Her first stone episode was about 4 years ago. She reports passing at least 4 to 5 stones spontaneously and has never required surgery in the past.  She does have a strong family history of nephrolithiasis especially in her mother side of the family. Prior CT evaluation reveals multiple small right renal stones and 1-2 left-sided punctate renal stones. She does have KUB for review today. Of note, she is chronically maintained on Topamax for chronic headaches. Patient also reports here over the last week or so she has been experiencing more issues with urinary frequency, urgency, voiding small amounts, difficulty urinating, dysuria. She is experience no fevers, chills, nausea, vomiting. REVIEW OF SYSTEMS:  Review of Systems   Constitutional:  Negative for chills and fever. Gastrointestinal:  Negative for abdominal distention, abdominal pain, nausea and vomiting. Genitourinary:  Positive for dysuria, frequency and urgency. Negative for difficulty urinating, flank pain and hematuria. Musculoskeletal:  Negative for back pain and gait problem. Psychiatric/Behavioral:  Negative for agitation and confusion. PHYSICAL EXAM:  Ht 5' 5\" (1.651 m)   Wt 137 lb (62.1 kg)   BMI 22.80 kg/m²   Physical Exam  Vitals and nursing note reviewed. Constitutional:       General: She is not in acute distress. Appearance: Normal appearance. She is not ill-appearing. Pulmonary:      Effort: Pulmonary effort is normal. No respiratory distress. Abdominal:      General: There is no distension. Tenderness: There is no abdominal tenderness. There is no right CVA tenderness or left CVA tenderness.    Neurological:      Mental Status: She is alert and oriented to person, place, and time. Mental status is at baseline. Psychiatric:         Mood and Affect: Mood normal.         Behavior: Behavior normal.       DATA:  Results for orders placed or performed in visit on 01/17/23   POCT Urinalysis Dipstick w/ Micro (Auto)   Result Value Ref Range    Color, UA Yellow     Clarity, UA Clear Clear    Glucose, Ur neg     Bilirubin Urine 0 mg/dL    Ketones, Urine Negative     Specific Gravity, UA 1.025 1.005 - 1.030    Blood, Urine Positive     pH, UA 5.0 4.5 - 8.0    Protein, UA Negative Negative    Nitrite, Urine Negative     Leukocytes, UA trace     Urobilinogen, Urine Normal     RBC Urine, POC 0     WBC Urine, POC 40     Bacteria Urine, POC 2+     yeast urine, poc 0     Casts Urine, POC 0     Epi Cells Urine, POC +     crystals urine, poc 0        IMAGING:  Impression   Impression: 1. Nonobstructive bowel gas pattern. 2.No definite renal/urinary tract calculi are identified radiographically. I have reviewed CT imaging and agree with radiologist interpretation that there are no obvious nephrolithiasis noted. ASSESSMENT/PLAN  1. Dysuria  Patient with dysuria and other lower urinary tract symptoms today. We will send her urine for culture and give dose of Rocephin here in the office.  - Culture, Urine  - POCT Urinalysis Dipstick w/ Micro (Auto)    2. Bilateral nephrolithiasis  Previous CT revealed very small bilateral stones. These are not visualized on KUB today. We will continue to monitor for gross of stones for possible ESWL in the future. - XR ABDOMEN (KUB) (SINGLE AP VIEW); Future    Orders Placed This Encounter   Procedures    Culture, Urine     Order Specific Question:   Specify (ex-cath, midstream, cysto, etc)? Answer:   clean catch    XR ABDOMEN (KUB) (SINGLE AP VIEW)     Standing Status:   Future     Standing Expiration Date:   1/17/2024    POCT Urinalysis Dipstick w/ Micro (Auto)          Return in about 1 year (around 1/17/2024) for KUB prior.     An electronic signature was used to authenticate this note. TOMA CARRION, APRN - CNP    All information inputted into the note by the MA to include chief complaint, past medical history, past surgical history, medications, allergies, social and family history and review of systems has been reviewed and updated as needed by me. EMR Dragon/transcription disclaimer: Much of this document is electronic transcription/translation of spoken language to printed text. The electronic translation of spoken language may be erroneous or, at times, nonsensical words or phrases may be inadvertently transcribed.  Although I have reviewed the document for such errors, some may still exist.

## 2023-01-19 DIAGNOSIS — F41.1 GENERALIZED ANXIETY DISORDER WITH PANIC ATTACKS: ICD-10-CM

## 2023-01-19 DIAGNOSIS — R30.0 DYSURIA: Primary | ICD-10-CM

## 2023-01-19 DIAGNOSIS — F41.0 GENERALIZED ANXIETY DISORDER WITH PANIC ATTACKS: ICD-10-CM

## 2023-01-19 LAB
ORGANISM: ABNORMAL
URINE CULTURE, ROUTINE: ABNORMAL
URINE CULTURE, ROUTINE: ABNORMAL

## 2023-01-19 RX ORDER — CEFDINIR 300 MG/1
300 CAPSULE ORAL 2 TIMES DAILY
Qty: 20 CAPSULE | Refills: 0 | Status: SHIPPED | OUTPATIENT
Start: 2023-01-19 | End: 2023-01-29

## 2023-01-20 NOTE — TELEPHONE ENCOUNTER
Johana Chavarria called to request a refill on her medication. UDS completed: 4/22/22  [facility]     Last office visit : 1/9/2023 - Dr. Wendy Greenwood  Next office visit : 5/9/2023 - Dr. Wendy Greenwood    Requested Prescriptions     Pending Prescriptions Disp Refills    clonazePAM (KLONOPIN) 0.5 MG tablet 60 tablet 1     Sig: Take 1 tablet by mouth 2 times daily as needed for Anxiety for up to 30 days. buPROPion (WELLBUTRIN XL) 300 MG extended release tablet 90 tablet 3     Sig: TAKE 1 TABLET BY MOUTH ONE TIME A DAY IN THE MORNING            Monica Gallegos     1/9/2023 10:28 AM                             Progress Note           Johana Chavarria 1990                             Chief Complaint   Patient presents with    Medication Check               Subjective:    26-year-old white female with history of bipolar disorder, anxiety, panic attacks, presents for follow-up. Currently kept on Lamictal, Wellbutrin, Seroquel, Klonopin PRN. Patient reports compliance. No SEs. Bright affect today. Smiling. More mood swings. Feels meds are not working well. Denies SI. Sleeping well. Good family support. Had good holidays. Some stress with mom and ex. Current Substance Use: Never had any issues with a stimulant, opioid or benzodiazepine abuse. Drank too much in the past.  Currently avoiding alcohol. Smokes cigarettes.       BP: BP 94/61   Pulse 86   Temp 98.2 °F (36.8 °C)   Ht 5' 5\" (1.651 m)   Wt 135 lb 11.2 oz (61.6 kg)   SpO2 98%   BMI 22.58 kg/m²         Review of Systems - 14 point review:  All negative:      Constitutional: (fevers, chills, night sweats, wt loss/gain, change in appetite, fatigue, somnolence)     HEENT: (ear pain or discharge, hearing loss, ear ringing, sinus pressure, nosebleed, nasal discharge, sore throat, oral sores, tooth pain, bleeding gums, hoarse voice, neck pain)      Cardiovascular: (HTN, chest pain, elevated cholesterol/lipids, palpitations, leg swelling, leg pain with walking)     Respiratory: (cough, wheezing, snoring, SOB with activity (dyspnea), SOB while lying flat (orthopnea), awakening with severe SOB (paroxysmal nocturnal dyspnea))     Gastrointestinal: (NVD, constipation, abdominal pain, bright red stools, black tarry stools, stool incontinence)     Genitourinary:  (pelvic pain, burning or frequency of urination, urinary urgency, blood in urine incomplete bladder emptying, urinary incontinence, STD; MEN: testicular pain or swelling, erectile dysfunction; WOMEN: LMP, heavy menstrual bleeding (menorrhagia), irregular periods, postmenopausal bleeding, menstrual pain (dymenorrhea, vaginal discharge)     Musculoskeletal: (bone pain/fracture, joint pain or swelling, musle pain)     Integumentary: (rashes, acne, non-healing sores, itching, breast lumps, breast pain, nipple discharge, hair loss)     Neurologic: (HA, muscle weakness, paresthesias (numbness, coldness, crawling or prickling), memory loss, seizure, dizziness)     Endocrine: (heat or cold intolerance, excessive thirst (polydipsia), excessive hunger (polyphagia))     Immune/Allergic: (hives, seasonal or environmental allergies, HIV exposure)     Hematologic/Lymphatic: (lymph node enlargement, easy bleeding or bruising)     History obtained via chart review and patient     PCP is MARSHALL FERRELL MD         Current Meds:     Home Medications           Prior to Admission medications    Medication Sig Start Date End Date Taking? Authorizing Provider   QUEtiapine (SEROQUEL) 100 MG tablet Take 1.5 tablets by mouth nightly 1/6/23 2/5/23   Florence Wu MD   diclofenac (VOLTAREN) 75 MG EC tablet Take 75 mg by mouth 2 times daily       Historical Provider, MD   gabapentin (NEURONTIN) 100 MG capsule Take 1 capsule by mouth daily as needed (pain) for up to 30 days. Max Daily Amount: 100 mg 1/3/23 2/2/23   Marshall Ferrell MD   clonazePAM (KLONOPIN) 0.5 MG tablet Take 1 tablet by mouth 2 times daily as needed for Anxiety for up  to 30 days. Patient not taking: Reported on 1/3/2023 12/10/22 1/9/23   Clark Burgess MD   butalbital-APAP-caffeine -40 MG CAPS per capsule Take 1 capsule by mouth every 6 hours as needed for Headaches or Migraine 12/9/22 4/8/23   Bam Mac MD   tranexamic acid (LYSTEDA) 650 MG TABS tablet Take 2 tablets by mouth 3 times daily 11/28/22     CHRIS Love   topiramate (TOPAMAX) 100 MG tablet TAKE 1 TABLET BY MOUTH TWO TIMES A DAY 10/27/22     Bam Mac MD   buPROPion (WELLBUTRIN XL) 150 MG extended release tablet TAKE 1 TABLET BY MOUTH ONE TIME A DAY IN THE MORNING 8/4/22     Clark Burgess MD   lamoTRIgine (LAMICTAL) 200 MG tablet Take 1 tablet by mouth in the morning. 8/4/22 1/3/23   Clark Burgess MD   AIMOVIG 140 MG/ML SOAJ INJECT 140MG UNDER THE SKIN ONCE MONTHLY 3/29/22     Bam Mac MD            MSE:  Appearance: Appropriately groomed. Made good eye contact. Behavior: Bright  Speech: Normal in tone, volume, and quality. No slurring, dysarthria or pressured speech noted. Mood: \"ok\"   Affect: Mood congruent. Thought Process: Appears linear, logical and goal oriented. Causality appears intact. Thought Content: Denies active suicidal and homicidal ideations. No overt delusions or paranoia appreciated. Perceptions: Denies auditory or visual hallucinations at present time. Not responding to internal stimuli. Concentration: Intact. Orientation: to person, place, date, and situation. Language: Intact. Fund of information: Intact. Memory: Recent and remote appear intact. Impulsivity: Limited. Neurovegitative: Fair appetite and sleep. Insight: Fair. Judgment: Fair.               Lab Results   Component Value Date      07/27/2022     K 3.7 07/27/2022      07/27/2022     CO2 21 (L) 07/27/2022     BUN 14 07/27/2022     CREATININE 0.6 07/27/2022     GLUCOSE 99 07/27/2022     CALCIUM 9.1 07/27/2022     PROT 6.9 07/27/2022     LABALBU 4.4 07/27/2022     BILITOT <0.2 07/27/2022     ALKPHOS 73 07/27/2022     AST 11 07/27/2022     ALT 8 07/27/2022     LABGLOM >60 07/27/2022     GFRAA >59 07/27/2022            Lab Results   Component Value Date/Time      07/27/2022 08:16 AM     K 3.7 07/27/2022 08:16 AM      07/27/2022 08:16 AM     CO2 21 07/27/2022 08:16 AM     BUN 14 07/27/2022 08:16 AM     CREATININE 0.6 07/27/2022 08:16 AM     GLUCOSE 99 07/27/2022 08:16 AM     CALCIUM 9.1 07/27/2022 08:16 AM      No results found for: CHOL  No results found for: TRIG  No results found for: HDL  No results found for: LDLCHOLESTEROL, LDLCALC  No results found for: LABVLDL, VLDL  No results found for: CHOLHDLRATIO  No results found for: LABA1C  No results found for: EAG  No results found for: TSHFT4, TSH  No results found for: VITD25     Assessments Administered:        Assessment:    1. Bipolar II disorder, mild, depressed, with anxious distress ([facility] [address]    2. Generalized anxiety disorder with panic attacks    3. Chronic post-traumatic stress disorder (PTSD)    4. Insomnia due to other mental disorder          No evidence of acute suicidality, homicidality or psychosis observed today. Psychiatrically stable. Plan:  1. Increase Lamictal and Wellbutrin for mood stabilization. Continue current regimen. The risks, benefits, side effects, indications, contraindications, and adverse effects of the medications have been discussed. Yes.  2. The pt has verbalized understanding and has capacity to give informed consent. 3. The [facility]in [facility] report has been reviewed according to [facility] regulations. 4. Supportive therapy offered. Patient will continue seeing her therapist.  5. Follow up:    4  mo   6. The patient has been advised to call with any problems. 7. Controlled substance Treatment Plan: NA  8.  The above listed medications have been continued, modifications in meds and other orders/labs as follows:                 Encounter Medications    No orders of the defined types were placed in this encounter.                       No orders of the defined types were placed in this encounter.        9. Additional comments: Consider checking thyroid function and vitamin B12 and D levels         10.Over 50% of the total visit time of 31  minutes was spent on counseling and/or coordination of care of:                         1. Bipolar II disorder, mild, depressed, with anxious distress (HCC)    2. Generalized anxiety disorder with panic attacks    3. Chronic post-traumatic stress disorder (PTSD)    4. Insomnia due to other mental disorder                   Florence Wu MD

## 2023-01-22 RX ORDER — BUPROPION HYDROCHLORIDE 300 MG/1
TABLET ORAL
Qty: 90 TABLET | Refills: 3 | Status: SHIPPED | OUTPATIENT
Start: 2023-01-22

## 2023-01-22 RX ORDER — CLONAZEPAM 0.5 MG/1
0.5 TABLET ORAL 2 TIMES DAILY PRN
Qty: 60 TABLET | Refills: 1 | Status: SHIPPED | OUTPATIENT
Start: 2023-01-22 | End: 2023-02-21

## 2023-02-02 PROCEDURE — 99284 EMERGENCY DEPT VISIT MOD MDM: CPT

## 2023-02-03 ENCOUNTER — HOSPITAL ENCOUNTER (EMERGENCY)
Age: 33
Discharge: HOME | End: 2023-02-03
Attending: EMERGENCY MEDICINE
Payer: COMMERCIAL

## 2023-02-03 ENCOUNTER — APPOINTMENT (OUTPATIENT)
Dept: CT IMAGING | Age: 33
End: 2023-02-03
Payer: COMMERCIAL

## 2023-02-03 VITALS
HEART RATE: 77 BPM | RESPIRATION RATE: 18 BRPM | WEIGHT: 137 LBS | HEIGHT: 65 IN | SYSTOLIC BLOOD PRESSURE: 91 MMHG | BODY MASS INDEX: 22.82 KG/M2 | OXYGEN SATURATION: 95 % | DIASTOLIC BLOOD PRESSURE: 57 MMHG | TEMPERATURE: 97.5 F

## 2023-02-03 DIAGNOSIS — S09.90XA CLOSED HEAD INJURY, INITIAL ENCOUNTER: ICD-10-CM

## 2023-02-03 DIAGNOSIS — S16.1XXA ACUTE STRAIN OF NECK MUSCLE, INITIAL ENCOUNTER: ICD-10-CM
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
## 2023-02-03 DIAGNOSIS — Z86.69 HISTORY OF MIGRAINE: ICD-10-CM

## 2023-02-03 LAB — HCG(URINE) PREGNANCY TEST: NEGATIVE

## 2023-02-03 PROCEDURE — 70450 CT HEAD/BRAIN W/O DYE: CPT | Performed by: RADIOLOGY

## 2023-02-03 PROCEDURE — 72125 CT NECK SPINE W/O DYE: CPT

## 2023-02-03 PROCEDURE — 72125 CT NECK SPINE W/O DYE: CPT | Performed by: RADIOLOGY

## 2023-02-03 PROCEDURE — 70450 CT HEAD/BRAIN W/O DYE: CPT

## 2023-02-03 PROCEDURE — 84703 CHORIONIC GONADOTROPIN ASSAY: CPT

## 2023-02-03 RX ORDER — CYCLOBENZAPRINE HCL 10 MG
10 TABLET ORAL 2 TIMES DAILY PRN
Qty: 15 TABLET | Refills: 0 | Status: SHIPPED | OUTPATIENT
Start: 2023-02-03 | End: 2023-02-13

## 2023-02-03 ASSESSMENT — ENCOUNTER SYMPTOMS
GASTROINTESTINAL NEGATIVE: 1
RESPIRATORY NEGATIVE: 1
EYES NEGATIVE: 1

## 2023-02-03 NOTE — ED NOTES
PT's father returned with carseat at this time. EMS loading at this time.      Yosef Plunkett RN  02/03/23 [id number]
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
## 2023-02-06 DIAGNOSIS — M51.37 DDD (DEGENERATIVE DISC DISEASE), LUMBOSACRAL: ICD-10-CM

## 2023-02-08 RX ORDER — GABAPENTIN 100 MG/1
100 CAPSULE ORAL DAILY PRN
Qty: 30 CAPSULE | Refills: 0 | Status: SHIPPED | OUTPATIENT
Start: 2023-02-08 | End: 2023-03-10

## 2023-02-08 NOTE — TELEPHONE ENCOUNTER
Sola  called to request a refill on her medication. Last office visit : 1/3/2023   Next office visit : 3/8/2023     Last UDS:   Amphetamine Screen, Urine   Date Value Ref Range Status   04/22/2022 Negative Negative <1000 ng/mL Final     Benzodiazepine Screen, Urine   Date Value Ref Range Status   04/22/2022 Negative Negative <100 ng/mL Final     Cocaine Metabolite Screen, Urine   Date Value Ref Range Status   04/22/2022 Negative Negative <300 ng/mL Final     Opiate Scrn, Ur   Date Value Ref Range Status   04/22/2022 Negative Negative < 300 ng/mL Final       Last Palmer Priestly: 01/20/23  Medication Contract: 01/09/23   Last Fill: 01/03/23    Requested Prescriptions     Pending Prescriptions Disp Refills    gabapentin (NEURONTIN) 100 MG capsule 30 capsule 0     Sig: Take 1 capsule by mouth daily as needed (pain) for up to 30 days. Max Daily Amount: 100 mg         Please approve or refuse this medication.    Daniel Jack MA

## 2023-02-08 NOTE — TELEPHONE ENCOUNTER
The current medical regimen is effective. Continue present plan and medications. UDS and controlled substance contract up to date. No unusual filling on current CLAUDE report. Tx continues to be medically necessary.     Ramiro Alfonso MD

## 2023-02-15 DIAGNOSIS — N92.0 MENORRHAGIA WITH REGULAR CYCLE: ICD-10-CM

## 2023-02-15 DIAGNOSIS — N94.6 DYSMENORRHEA: ICD-10-CM

## 2023-02-15 RX ORDER — TRANEXAMIC ACID 650 MG/1
1300 TABLET ORAL 3 TIMES DAILY
Qty: 30 TABLET | Refills: 5 | OUTPATIENT
Start: 2023-02-15

## 2023-02-20 DIAGNOSIS — G44.221 CHRONIC TENSION-TYPE HEADACHE, INTRACTABLE: ICD-10-CM

## 2023-02-20 DIAGNOSIS — G43.019 INTRACTABLE MIGRAINE WITHOUT AURA AND WITHOUT STATUS MIGRAINOSUS: ICD-10-CM

## 2023-02-20 RX ORDER — BUTALBITAL, ACETAMINOPHEN AND CAFFEINE 300; 40; 50 MG/1; MG/1; MG/1
1 CAPSULE ORAL EVERY 6 HOURS PRN
Qty: 40 CAPSULE | Refills: 5 | Status: SHIPPED | OUTPATIENT
Start: 2023-02-20 | End: 2023-06-20

## 2023-02-20 NOTE — TELEPHONE ENCOUNTER
Requested Prescriptions     Pending Prescriptions Disp Refills    butalbital-APAP-caffeine -40 MG CAPS per capsule 40 capsule 1     Sig: Take 1 capsule by mouth every 6 hours as needed for Headaches or Migraine       Last Office Visit:  12/29/2022  Next Office Visit:  12/28/23  Last Medication Refill:  12/9/22 with 1 REESE Paniagua up to date:  1/20/23    *RX updated to reflect   2/20/23  fill date*

## 2023-03-08 ENCOUNTER — OFFICE VISIT (OUTPATIENT)
Dept: PRIMARY CARE CLINIC | Age: 33
End: 2023-03-08

## 2023-03-08 VITALS
TEMPERATURE: 97.8 F | HEIGHT: 65 IN | SYSTOLIC BLOOD PRESSURE: 120 MMHG | HEART RATE: 88 BPM | OXYGEN SATURATION: 96 % | DIASTOLIC BLOOD PRESSURE: 60 MMHG | WEIGHT: 143.6 LBS | BODY MASS INDEX: 23.93 KG/M2

## 2023-03-08 DIAGNOSIS — Z51.81 MEDICATION MONITORING ENCOUNTER: ICD-10-CM

## 2023-03-08 DIAGNOSIS — M51.37 DDD (DEGENERATIVE DISC DISEASE), LUMBOSACRAL: Primary | ICD-10-CM

## 2023-03-08 DIAGNOSIS — M26.643 ARTHRITIS OF BOTH TEMPOROMANDIBULAR JOINTS: ICD-10-CM

## 2023-03-08 DIAGNOSIS — F31.81 BIPOLAR 2 DISORDER (HCC): ICD-10-CM

## 2023-03-08 RX ORDER — GABAPENTIN 100 MG/1
100 CAPSULE ORAL 3 TIMES DAILY PRN
Qty: 90 CAPSULE | Refills: 0 | Status: SHIPPED | OUTPATIENT
Start: 2023-03-10 | End: 2023-04-09

## 2023-03-08 SDOH — ECONOMIC STABILITY: FOOD INSECURITY: WITHIN THE PAST 12 MONTHS, THE FOOD YOU BOUGHT JUST DIDN'T LAST AND YOU DIDN'T HAVE MONEY TO GET MORE.: NEVER TRUE

## 2023-03-08 SDOH — ECONOMIC STABILITY: INCOME INSECURITY: HOW HARD IS IT FOR YOU TO PAY FOR THE VERY BASICS LIKE FOOD, HOUSING, MEDICAL CARE, AND HEATING?: NOT VERY HARD

## 2023-03-08 SDOH — ECONOMIC STABILITY: FOOD INSECURITY: WITHIN THE PAST 12 MONTHS, YOU WORRIED THAT YOUR FOOD WOULD RUN OUT BEFORE YOU GOT MONEY TO BUY MORE.: NEVER TRUE

## 2023-03-08 ASSESSMENT — ENCOUNTER SYMPTOMS
NAUSEA: 0
APNEA: 0
COLOR CHANGE: 0
EYE ITCHING: 0
EYE DISCHARGE: 0
BACK PAIN: 0
STRIDOR: 0
FACIAL SWELLING: 0
VOMITING: 0
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
## 2023-04-05 ENCOUNTER — HOSPITAL ENCOUNTER (EMERGENCY)
Age: 33
Discharge: HOME | End: 2023-04-05
Payer: COMMERCIAL

## 2023-04-05 VITALS
TEMPERATURE: 98.3 F | HEART RATE: 81 BPM | BODY MASS INDEX: 23.82 KG/M2 | SYSTOLIC BLOOD PRESSURE: 97 MMHG | WEIGHT: 143 LBS | HEIGHT: 65 IN | OXYGEN SATURATION: 97 % | RESPIRATION RATE: 20 BRPM | DIASTOLIC BLOOD PRESSURE: 63 MMHG

## 2023-04-05 DIAGNOSIS — R51.9 ACUTE NONINTRACTABLE HEADACHE, UNSPECIFIED HEADACHE TYPE: Primary | ICD-10-CM

## 2023-04-05 PROCEDURE — 99284 EMERGENCY DEPT VISIT MOD MDM: CPT

## 2023-04-05 PROCEDURE — 96372 THER/PROPH/DIAG INJ SC/IM: CPT

## 2023-04-05 PROCEDURE — 6360000002 HC RX W HCPCS: Performed by: NURSE PRACTITIONER

## 2023-04-05 RX ORDER — HYDROMORPHONE HYDROCHLORIDE 1 MG/ML
1 INJECTION, SOLUTION INTRAMUSCULAR; INTRAVENOUS; SUBCUTANEOUS ONCE
Status: COMPLETED | OUTPATIENT
Start: 2023-04-05 | End: 2023-04-05

## 2023-04-05 RX ORDER — PROMETHAZINE HYDROCHLORIDE 25 MG/ML
25 INJECTION, SOLUTION INTRAMUSCULAR; INTRAVENOUS ONCE
Status: COMPLETED | OUTPATIENT
Start: 2023-04-05 | End: 2023-04-05

## 2023-04-05 RX ORDER — KETOROLAC TROMETHAMINE 30 MG/ML
30 INJECTION, SOLUTION INTRAMUSCULAR; INTRAVENOUS ONCE
Status: COMPLETED | OUTPATIENT
Start: 2023-04-05 | End: 2023-04-05

## 2023-04-05 RX ADMIN — KETOROLAC TROMETHAMINE 30 MG: 30 INJECTION, SOLUTION INTRAMUSCULAR; INTRAVENOUS at 22:19

## 2023-04-05 RX ADMIN — PROMETHAZINE HYDROCHLORIDE 25 MG: 25 INJECTION INTRAMUSCULAR; INTRAVENOUS at 22:30

## 2023-04-05 RX ADMIN — HYDROMORPHONE HYDROCHLORIDE 1 MG: 1 INJECTION, SOLUTION INTRAMUSCULAR; INTRAVENOUS; SUBCUTANEOUS at 22:20

## 2023-04-05 ASSESSMENT — PAIN DESCRIPTION - LOCATION: LOCATION: HEAD

## 2023-04-05 ASSESSMENT — ENCOUNTER SYMPTOMS: NAUSEA: 1

## 2023-04-05 ASSESSMENT — PAIN DESCRIPTION - DESCRIPTORS: DESCRIPTORS: ACHING

## 2023-04-05 ASSESSMENT — PAIN SCALES - GENERAL: PAINLEVEL_OUTOF10: 9

## 2023-04-06 NOTE — ED PROVIDER NOTES
SOAJ    INJECT 140MG UNDER THE SKIN ONCE MONTHLY    BUPROPION (WELLBUTRIN XL) 300 MG EXTENDED RELEASE TABLET    TAKE 1 TABLET BY MOUTH ONE TIME A DAY IN THE MORNING    BUTALBITAL-APAP-CAFFEINE -40 MG CAPS PER CAPSULE    Take 1 capsule by mouth every 6 hours as needed for Headaches or Migraine    CLONAZEPAM (KLONOPIN) 0.5 MG TABLET    Take 1 tablet by mouth 2 times daily as needed for Anxiety for up to 30 days. GABAPENTIN (NEURONTIN) 100 MG CAPSULE    Take 1 capsule by mouth 3 times daily as needed (pain) for up to 30 days. Max Daily Amount: 300 mg    LAMOTRIGINE (LAMICTAL) 200 MG TABLET    Take 1 tablet by mouth in the morning.     LAMOTRIGINE (LAMICTAL) 25 MG TABLET    Take 2 tablets by mouth daily    QUETIAPINE (SEROQUEL) 100 MG TABLET    Take 1.5 tablets by mouth nightly    TOPIRAMATE (TOPAMAX) 100 MG TABLET    TAKE 1 TABLET BY MOUTH TWO TIMES A DAY    TRANEXAMIC ACID (LYSTEDA) 650 MG TABS TABLET    Take 2 tablets by mouth 3 times daily       ALLERGIES     Morphine and Salonpas [camphor-menthol-methyl sal]    FAMILY HISTORY       Family History   Problem Relation Age of Onset    Kidney stones Mother     Hypertension Father     Cancer Maternal Grandmother         cervical    Heart Failure Maternal Grandmother     Diabetes Paternal Grandfather     High Blood Pressure Paternal Grandfather     Diabetes Maternal Aunt     Cancer Maternal Cousin         cervical    Cancer Maternal Cousin         cervical          SOCIAL HISTORY       Social History     Socioeconomic History    Marital status:      Spouse name: Sam Oseguera    Number of children: 2    Years of education: 12    Highest education level: Some college, no degree   Occupational History    Occupation: Behavioral Tech      Comment: [employer]   Tobacco Use    Smoking status: Every Day     Packs/day: 0.50     Types: Cigarettes     Start date: 2005    Smokeless tobacco: Never   Vaping Use    Vaping Use: Never used   Substance and Sexual Activity

## 2023-04-13 ENCOUNTER — TELEPHONE (OUTPATIENT)
Dept: NEUROLOGY | Age: 33
End: 2023-04-13

## 2023-04-17 NOTE — TELEPHONE ENCOUNTER
Tried calling patient, had to leave a voicemail letting her know Dr. Asia Taylor does not have any sooner appointment as of right now but she is on the cancellation list. Advised her to contact her PCP or go to ED if headaches do not subside.

## 2023-04-27 DIAGNOSIS — M51.37 DDD (DEGENERATIVE DISC DISEASE), LUMBOSACRAL: ICD-10-CM

## 2023-04-27 DIAGNOSIS — G43.019 INTRACTABLE MIGRAINE WITHOUT AURA AND WITHOUT STATUS MIGRAINOSUS: ICD-10-CM

## 2023-04-27 DIAGNOSIS — G44.221 CHRONIC TENSION-TYPE HEADACHE, INTRACTABLE: ICD-10-CM

## 2023-04-27 RX ORDER — BUTALBITAL, ACETAMINOPHEN AND CAFFEINE 300; 40; 50 MG/1; MG/1; MG/1
1 CAPSULE ORAL EVERY 6 HOURS PRN
Qty: 40 CAPSULE | Refills: 5 | OUTPATIENT
Start: 2023-04-27 | End: 2023-08-25

## 2023-04-27 RX ORDER — GABAPENTIN 100 MG/1
100 CAPSULE ORAL 3 TIMES DAILY PRN
Qty: 48 CAPSULE | Refills: 0 | Status: SHIPPED | OUTPATIENT
Start: 2023-04-27 | End: 2023-05-13

## 2023-04-27 NOTE — TELEPHONE ENCOUNTER
Sanjuana Kothari called to request a refill on her medication. Last office visit : 3/8/2023   Next office visit : 6/13/2023     Last UDS:   Amphetamine Screen, Urine   Date Value Ref Range Status   03/08/2023 neg  Final     Barbiturate Screen, Urine   Date Value Ref Range Status   03/08/2023 pos  Final     Benzodiazepine Screen, Urine   Date Value Ref Range Status   03/08/2023 neg  Final     Buprenorphine Urine   Date Value Ref Range Status   03/08/2023 neg  Final     Cocaine Metabolite Screen, Urine   Date Value Ref Range Status   03/08/2023 neg  Final     Gabapentin Screen, Urine   Date Value Ref Range Status   03/08/2023 neg  Final     MDMA, Urine   Date Value Ref Range Status   03/08/2023 neg  Final     Methamphetamine, Urine   Date Value Ref Range Status   03/08/2023 neg  Final     Opiate Scrn, Ur   Date Value Ref Range Status   03/08/2023 neg  Final     Oxycodone Screen, Ur   Date Value Ref Range Status   03/08/2023 neg  Final     PCP Screen, Urine   Date Value Ref Range Status   03/08/2023 neg  Final     Propoxyphene Screen, Urine   Date Value Ref Range Status   03/08/2023 neg  Final     THC Screen, Urine   Date Value Ref Range Status   03/08/2023 neg  Final     Tricyclic Antidepressants, Urine   Date Value Ref Range Status   03/08/2023 neg  Final       Last Cece Hernandezerer: 01/20/23  Medication Contract: 03/08/23   Last Fill: 03/10/23    Requested Prescriptions     Pending Prescriptions Disp Refills    gabapentin (NEURONTIN) 100 MG capsule 90 capsule 0     Sig: Take 1 capsule by mouth 3 times daily as needed (pain) for up to 30 days. Max Daily Amount: 300 mg         Please approve or refuse this medication.    Layla Candelaria MA

## 2023-04-27 NOTE — TELEPHONE ENCOUNTER
The current medical regimen is effective. Continue present plan and medications. UDS and controlled substance contract up to date. No unusual filling on current CLAUDE report. Tx continues to be medically necessary.     Berlin Solis MD

## 2023-05-10 ENCOUNTER — OFFICE VISIT (OUTPATIENT)
Dept: ENT CLINIC | Age: 33
End: 2023-05-10
Payer: COMMERCIAL

## 2023-05-10 VITALS
SYSTOLIC BLOOD PRESSURE: 114 MMHG | DIASTOLIC BLOOD PRESSURE: 74 MMHG | BODY MASS INDEX: 22.66 KG/M2 | WEIGHT: 136 LBS | HEIGHT: 65 IN

## 2023-05-10 DIAGNOSIS — H69.82 EUSTACHIAN TUBE DYSFUNCTION, LEFT: ICD-10-CM

## 2023-05-10 DIAGNOSIS — H69.92 NEGATIVE MIDDLE EAR PRESSURE OF LEFT EAR: Primary | ICD-10-CM

## 2023-05-10 PROCEDURE — 99213 OFFICE O/P EST LOW 20 MIN: CPT | Performed by: PHYSICIAN ASSISTANT

## 2023-05-10 RX ORDER — METHYLPREDNISOLONE 4 MG/1
TABLET ORAL
Qty: 1 KIT | Refills: 0 | Status: SHIPPED | OUTPATIENT
Start: 2023-05-10

## 2023-05-10 RX ORDER — PSEUDOEPHEDRINE HCL 30 MG
30 TABLET ORAL 3 TIMES DAILY
Qty: 30 TABLET | Refills: 3 | Status: SHIPPED | OUTPATIENT
Start: 2023-05-10 | End: 2024-05-09

## 2023-05-10 RX ORDER — CLINDAMYCIN HYDROCHLORIDE 300 MG/1
300 CAPSULE ORAL 3 TIMES DAILY
Qty: 30 CAPSULE | Refills: 0 | Status: SHIPPED | OUTPATIENT
Start: 2023-05-10 | End: 2023-05-20

## 2023-05-10 NOTE — PROGRESS NOTES
Ms. Cristina Baig is a pleasant 27-year-old female that presents for reevaluation of the left ear. Patient reports that over the last 7 to 10 days she has noted increasing amounts of discomfort to the left ear as well as muffled hearing. She has a history of chronic eustachian tube dysfunction that was previously treated. She denies any drainage from the ear canal or any issues with dizziness. Patient has a history of having multiple myringotomy tubes that have been placed by Dr. Pradeep Valerio in the past.      Physical examination with the microscope revealed the patient to have a retracted TM with a dull TM noted. There is no infection of the canal appreciated. Examination of the right ear demonstrated normal TM and canal.  Neck exam demonstrated no lymphadenopathy or thyromegaly. Oral exam demonstrated no evidence of TMJ arthralgia with her dental disease noted to remain about the same with no evidence of an abscess. Impression: Recurrent negative middle ear pressure to the left ear secondary to chronic eustachian tube dysfunction    Plan: We will place the patient on clindamycin, Medrol Dosepak, and Sudafed. She is to follow-up in 2 weeks for reevaluation. Due to her recurring history, I would like her to see Dr. Vanessa Ramirez to discuss the possibilities of eustachian tube dilatation of the left ear. Patient is agreeable and wishes to proceed. She was reminded to call if she has any questions or problems.       Electronically signed by Yasmin Caceres PA-C on 5/10/23 at 11:44 AM CDT

## 2023-05-30 DIAGNOSIS — M51.37 DDD (DEGENERATIVE DISC DISEASE), LUMBOSACRAL: ICD-10-CM

## 2023-05-30 DIAGNOSIS — G43.019 INTRACTABLE MIGRAINE WITHOUT AURA AND WITHOUT STATUS MIGRAINOSUS: ICD-10-CM

## 2023-05-30 DIAGNOSIS — G44.221 CHRONIC TENSION-TYPE HEADACHE, INTRACTABLE: ICD-10-CM

## 2023-05-30 RX ORDER — GABAPENTIN 100 MG/1
CAPSULE ORAL
Qty: 90 CAPSULE | Refills: 0 | OUTPATIENT
Start: 2023-05-30

## 2023-05-30 RX ORDER — GABAPENTIN 100 MG/1
100 CAPSULE ORAL 3 TIMES DAILY PRN
Qty: 42 CAPSULE | Refills: 0 | Status: SHIPPED | OUTPATIENT
Start: 2023-05-30 | End: 2023-06-13

## 2023-05-30 RX ORDER — BUTALBITAL, ACETAMINOPHEN AND CAFFEINE 300; 40; 50 MG/1; MG/1; MG/1
1 CAPSULE ORAL EVERY 6 HOURS PRN
Qty: 40 CAPSULE | Refills: 5 | OUTPATIENT
Start: 2023-05-30 | End: 2023-09-27

## 2023-05-30 NOTE — TELEPHONE ENCOUNTER
Iftikhar Piter called to request a refill on her medication. Last office visit : 3/8/2023   Next office visit : 5/30/2023     Last UDS:   Amphetamine Screen, Urine   Date Value Ref Range Status   03/08/2023 neg  Final     Barbiturate Screen, Urine   Date Value Ref Range Status   03/08/2023 pos  Final     Benzodiazepine Screen, Urine   Date Value Ref Range Status   03/08/2023 neg  Final     Buprenorphine Urine   Date Value Ref Range Status   03/08/2023 neg  Final     Cocaine Metabolite Screen, Urine   Date Value Ref Range Status   03/08/2023 neg  Final     Gabapentin Screen, Urine   Date Value Ref Range Status   03/08/2023 neg  Final     MDMA, Urine   Date Value Ref Range Status   03/08/2023 neg  Final     Methamphetamine, Urine   Date Value Ref Range Status   03/08/2023 neg  Final     Opiate Scrn, Ur   Date Value Ref Range Status   03/08/2023 neg  Final     Oxycodone Screen, Ur   Date Value Ref Range Status   03/08/2023 neg  Final     PCP Screen, Urine   Date Value Ref Range Status   03/08/2023 neg  Final     Propoxyphene Screen, Urine   Date Value Ref Range Status   03/08/2023 neg  Final     THC Screen, Urine   Date Value Ref Range Status   03/08/2023 neg  Final     Tricyclic Antidepressants, Urine   Date Value Ref Range Status   03/08/2023 neg  Final       Last Moraima Downs: 5/30/23  Medication Contract: 3/8/23   Last Fill: 4/27/23    Requested Prescriptions     Pending Prescriptions Disp Refills    gabapentin (NEURONTIN) 100 MG capsule 48 capsule 0     Sig: Take 1 capsule by mouth 3 times daily as needed (pain) for up to 16 days. Max Daily Amount: 300 mg         Please approve or refuse this medication.    Lazaro Mccormick LPN
The current medical regimen is effective. Continue present plan and medications. UDS and controlled substance contract up to date. No unusual filling on current CLAUDE report. Tx continues to be medically necessary.     Ewelina De Santiago MD
Pertinent HPI/PMH/PSH/FHx/SHx and Review of Systems contained within  HPI:  Patient p/w CC skin tear L tibial region  x  1 days, new onset. presents with EMS from home, reports patient woke up and got up to go to the bathroom walking with walker reports she hit her lower left leg on a piece of furniture. pt aaox3, has 24hr aide at home, denies falling or head trauma  PMH/PSH relevant for: HTN, HLD, 81mg asa  ROS negative for: fever, Chest pain, SOB, Nausea, vomiting, diarrhea, abdominal pain, HA, LLE pain  FamilyHx not otherwise contributory  SocialHx: former  at , of [employer], moved to [place] & has been in [place] for 65 years

## 2023-05-31 RX ORDER — ERENUMAB-AOOE 140 MG/ML
INJECTION, SOLUTION SUBCUTANEOUS
Qty: 3 ML | Refills: 3 | Status: SHIPPED | OUTPATIENT
Start: 2023-05-31

## 2023-05-31 NOTE — TELEPHONE ENCOUNTER
Requested Prescriptions     Pending Prescriptions Disp Refills    AIMOVIG 140 MG/ML SOAJ [Pharmacy Med Name: AIMOVIG 140MG/ML AUTOINJECTOR] 3 mL 3     Sig: INJECT 140MG UNDER THE SKIN ONCE MONTHLY       Last Office Visit: 12/29/2022  Next Office Visit: 6/29/2023  Last Medication Refill: 3/29/2022 with 3 RF

## 2023-06-02 ENCOUNTER — OFFICE VISIT (OUTPATIENT)
Dept: ENT CLINIC | Age: 33
End: 2023-06-02
Payer: COMMERCIAL

## 2023-06-02 VITALS
WEIGHT: 136 LBS | SYSTOLIC BLOOD PRESSURE: 128 MMHG | HEIGHT: 65 IN | BODY MASS INDEX: 22.66 KG/M2 | DIASTOLIC BLOOD PRESSURE: 80 MMHG

## 2023-06-02 DIAGNOSIS — H69.82 EUSTACHIAN TUBE DYSFUNCTION, LEFT: Primary | ICD-10-CM

## 2023-06-02 PROCEDURE — 99213 OFFICE O/P EST LOW 20 MIN: CPT | Performed by: PHYSICIAN ASSISTANT

## 2023-06-02 NOTE — PROGRESS NOTES
Ms. Anaya Ragland is a pleasant 59-year-old female who presents for a 2-week follow-up due to a left middle ear effusion. Patient has a chronic history of recurring eustachian tube dysfunction. Patient reports that after completing the medication, the ear is feeling back to normal with normal hearing and no otalgia. She denies any drainage from the canal.      Physical examination with the microscope revealed the patient to have normal TMs and canals bilaterally with normal mobility to pneumonic exam.  Neck exam demonstrated no lymphadenopathy or thyromegaly. Oral exam was unrevealing for change. Impression: Resolved left middle ear effusion      Plan: Due to the patient's history of recurring middle ear effusions to the left ear, I have recommended her to see Dr. Xander Logan for possible eustachian tube dilatation. She currently has an appt. to see Dr. Xander Logan for the end of June. Patient was reminded to call if she has any questions or problems.       Electronically signed by Sarah Humphries PA-C on 6/2/23 at 11:58 AM CDT

## 2023-06-07 ENCOUNTER — TELEPHONE (OUTPATIENT)
Dept: NEUROLOGY | Age: 33
End: 2023-06-07

## 2023-06-07 NOTE — TELEPHONE ENCOUNTER
Obi Pierce (Key: [id number])  Rx #: [id number]  Aimovig 140MG/ML auto-injectors     Form  MedImpact ePA Form 2017 NCPDP  Created  6 days ago  Sent to Plan  2 minutes ago  Plan Response  2 minutes ago  Submit Clinical Questions  less than a minute ago  Determination  Wait for Determination  Please wait for MedImpact 2017 to return a determination.

## 2023-06-13 ENCOUNTER — OFFICE VISIT (OUTPATIENT)
Dept: PRIMARY CARE CLINIC | Age: 33
End: 2023-06-13
Payer: COMMERCIAL

## 2023-06-13 VITALS
WEIGHT: 135.8 LBS | HEART RATE: 77 BPM | DIASTOLIC BLOOD PRESSURE: 74 MMHG | HEIGHT: 65 IN | BODY MASS INDEX: 22.63 KG/M2 | OXYGEN SATURATION: 99 % | TEMPERATURE: 97.2 F | SYSTOLIC BLOOD PRESSURE: 116 MMHG

## 2023-06-13 DIAGNOSIS — Z13.220 SCREENING FOR HYPERLIPIDEMIA: ICD-10-CM

## 2023-06-13 DIAGNOSIS — R53.83 OTHER FATIGUE: ICD-10-CM

## 2023-06-13 DIAGNOSIS — G43.019 INTRACTABLE MIGRAINE WITHOUT AURA AND WITHOUT STATUS MIGRAINOSUS: ICD-10-CM

## 2023-06-13 DIAGNOSIS — Z79.899 MEDICATION MANAGEMENT: ICD-10-CM

## 2023-06-13 DIAGNOSIS — M51.37 DDD (DEGENERATIVE DISC DISEASE), LUMBOSACRAL: ICD-10-CM

## 2023-06-13 DIAGNOSIS — Z11.4 SCREENING FOR HIV WITHOUT PRESENCE OF RISK FACTORS: ICD-10-CM

## 2023-06-13 DIAGNOSIS — Z00.00 WELL ADULT EXAM: Primary | ICD-10-CM

## 2023-06-13 DIAGNOSIS — F31.81 BIPOLAR 2 DISORDER (HCC): ICD-10-CM

## 2023-06-13 DIAGNOSIS — Z11.59 ENCOUNTER FOR HEPATITIS C SCREENING TEST FOR LOW RISK PATIENT: ICD-10-CM

## 2023-06-13 PROCEDURE — 99214 OFFICE O/P EST MOD 30 MIN: CPT | Performed by: NURSE PRACTITIONER

## 2023-06-15 DIAGNOSIS — R53.83 OTHER FATIGUE: ICD-10-CM

## 2023-06-15 DIAGNOSIS — Z11.59 ENCOUNTER FOR HEPATITIS C SCREENING TEST FOR LOW RISK PATIENT: ICD-10-CM

## 2023-06-15 DIAGNOSIS — Z13.220 SCREENING FOR HYPERLIPIDEMIA: ICD-10-CM

## 2023-06-15 DIAGNOSIS — Z11.4 SCREENING FOR HIV WITHOUT PRESENCE OF RISK FACTORS: ICD-10-CM

## 2023-06-15 DIAGNOSIS — F31.81 BIPOLAR 2 DISORDER (HCC): ICD-10-CM

## 2023-06-15 DIAGNOSIS — Z00.00 WELL ADULT EXAM: ICD-10-CM

## 2023-06-15 LAB
25(OH)D3 SERPL-MCNC: 24.7 NG/ML
ALBUMIN SERPL-MCNC: 4.5 G/DL (ref 3.5–5.2)
ALP SERPL-CCNC: 73 U/L (ref 35–104)
ALT SERPL-CCNC: 9 U/L (ref 5–33)
ANION GAP SERPL CALCULATED.3IONS-SCNC: 12 MMOL/L (ref 7–19)
AST SERPL-CCNC: 15 U/L (ref 5–32)
BASOPHILS # BLD: 0 K/UL (ref 0–0.2)
BASOPHILS NFR BLD: 0.5 % (ref 0–1)
BILIRUB SERPL-MCNC: <0.2 MG/DL (ref 0.2–1.2)
BUN SERPL-MCNC: 11 MG/DL (ref 6–20)
CALCIUM SERPL-MCNC: 9.4 MG/DL (ref 8.6–10)
CHLORIDE SERPL-SCNC: 108 MMOL/L (ref 98–111)
CHOLEST SERPL-MCNC: 182 MG/DL (ref 160–199)
CO2 SERPL-SCNC: 23 MMOL/L (ref 22–29)
CREAT SERPL-MCNC: 0.6 MG/DL (ref 0.5–0.9)
EOSINOPHIL # BLD: 0.3 K/UL (ref 0–0.6)
EOSINOPHIL NFR BLD: 5.7 % (ref 0–5)
ERYTHROCYTE [DISTWIDTH] IN BLOOD BY AUTOMATED COUNT: 13.9 % (ref 11.5–14.5)
GLUCOSE SERPL-MCNC: 86 MG/DL (ref 74–109)
HCT VFR BLD AUTO: 41.4 % (ref 37–47)
HCV AB SERPL QL IA: NORMAL
HDLC SERPL-MCNC: 47 MG/DL (ref 65–121)
HGB BLD-MCNC: 13.1 G/DL (ref 12–16)
IMM GRANULOCYTES # BLD: 0 K/UL
LDLC SERPL CALC-MCNC: 123 MG/DL
LYMPHOCYTES # BLD: 2.1 K/UL (ref 1.1–4.5)
LYMPHOCYTES NFR BLD: 37.1 % (ref 20–40)
MCH RBC QN AUTO: 30.6 PG (ref 27–31)
MCHC RBC AUTO-ENTMCNC: 31.6 G/DL (ref 33–37)
MCV RBC AUTO: 96.7 FL (ref 81–99)
MONOCYTES # BLD: 0.4 K/UL (ref 0–0.9)
MONOCYTES NFR BLD: 6.8 % (ref 0–10)
NEUTROPHILS # BLD: 2.8 K/UL (ref 1.5–7.5)
NEUTS SEG NFR BLD: 49.7 % (ref 50–65)
PLATELET # BLD AUTO: 248 K/UL (ref 130–400)
PMV BLD AUTO: 11.8 FL (ref 9.4–12.3)
POTASSIUM SERPL-SCNC: 3.9 MMOL/L (ref 3.5–5)
PROT SERPL-MCNC: 7.3 G/DL (ref 6.6–8.7)
RBC # BLD AUTO: 4.28 M/UL (ref 4.2–5.4)
SODIUM SERPL-SCNC: 143 MMOL/L (ref 136–145)
TRIGL SERPL-MCNC: 61 MG/DL (ref 0–149)
TSH SERPL DL<=0.005 MIU/L-ACNC: 1.4 UIU/ML (ref 0.35–5.5)
WBC # BLD AUTO: 5.6 K/UL (ref 4.8–10.8)

## 2023-06-20 ENCOUNTER — TELEPHONE (OUTPATIENT)
Dept: PRIMARY CARE CLINIC | Age: 33
End: 2023-06-20

## 2023-06-20 PROBLEM — Z00.00 WELL ADULT EXAM: Status: ACTIVE | Noted: 2023-06-20

## 2023-06-20 LAB
HIV INTERPRETATION: NEGATIVE
HIV-1 ANTIBODY: NEGATIVE
HIV-2 AB: NEGATIVE

## 2023-06-20 ASSESSMENT — ENCOUNTER SYMPTOMS
BACK PAIN: 1
VOMITING: 0
COLOR CHANGE: 0
CHEST TIGHTNESS: 0
ABDOMINAL PAIN: 0
NAUSEA: 0
DIARRHEA: 0
COUGH: 0
SORE THROAT: 0
SHORTNESS OF BREATH: 0

## 2023-06-20 NOTE — ASSESSMENT & PLAN NOTE
Patient here today for a 3 month follow up and for the purpose of establishing care. She was a previous patient of Dr. Celena Mejia. She reports chronic low back pain in which she is taking Gabapentin 100mg three times a day, in which she notes moderate relief. She sees Dr. Indira Shine for her migraines. Will proceed today with labs, along with updated medication contract and urine drug screen.

## 2023-06-20 NOTE — PROGRESS NOTES
2023     Gerardo Chew (:  1990) is a 28 y.o. female,Established patient, here for evaluation of the following chief complaint(s):  3 Month Follow-Up      ASSESSMENT/PLAN:  1. Well adult exam  Assessment & Plan:   Patient here today for a 3 month follow up and for the purpose of establishing care. She was a previous patient of Dr. Martina Gu. She reports chronic low back pain in which she is taking Gabapentin 100mg three times a day, in which she notes moderate relief. She sees Dr. Sandra Gardiner for her migraines. Will proceed today with labs, along with updated medication contract and urine drug screen. Orders:  -     Comprehensive Metabolic Panel; Future  2. DDD (degenerative disc disease), lumbosacral  Assessment & Plan:   Currently taking Gabapentin 100mg three times a day with moderate noted relief. Patient reports to still be in pain. Will refer to pain management per patient's request for further evaluation and treatment. [facility]:  -     [facility] CHRIS Grider, Pain Medicine, [place]  3. Medication management  -     POCT Rapid Drug Screen  4. Intractable migraine without aura and without status migrainosus  Assessment & Plan:  Follows and treated by Dr. Sandra Gardiner (Amovig, Fioricet, Topamax)   5. Bipolar 2 disorder [facility])  Assessment & Plan:   Takes Klonopin and Seroquel prescribed by psychiatry. Orders:  -     CBC with Auto Differential; Future  6. Screening for hyperlipidemia  -     Lipid Panel; Future  7. Other fatigue  -     TSH with Reflex to FT4; Future  -     Vitamin D 25 Hydroxy; Future  8. Encounter for hepatitis C screening test for low risk patient  -     Hepatitis C Antibody; Future  9. Screening for HIV without presence of risk factors  -     HIV 1/2 Antibody Multispot; Future      Return in about 3 months (around 2023). SUBJECTIVE/OBJECTIVE:  HPI    Prior to Visit Medications    Medication Sig Taking?  Authorizing Provider   AIMOVIG 140 MG/ML SOAJ INJECT 140MG

## 2023-06-20 NOTE — ASSESSMENT & PLAN NOTE
Currently taking Gabapentin 100mg three times a day with moderate noted relief. Patient reports to still be in pain. Will refer to pain management per patient's request for further evaluation and treatment.

## 2023-06-20 NOTE — TELEPHONE ENCOUNTER
----- Message from CHRIS Caldwell CNP sent at 6/19/2023 10:11 PM CDT -----  Please call and notify patient that her vitamin D is slightly low, she could benefit from taking over the counter daily supplementation. Additional her HDL \"good cholesterol\" is low. Recommend increasing daily exercise to improve.

## 2023-06-21 ENCOUNTER — TELEPHONE (OUTPATIENT)
Dept: PRIMARY CARE CLINIC | Age: 33
End: 2023-06-21

## 2023-06-21 NOTE — TELEPHONE ENCOUNTER
----- Message from CHRIS Gill CNP sent at 6/21/2023 11:00 AM CDT -----  Please notify patient that her HIV panel came back negative

## 2023-06-26 ENCOUNTER — PROCEDURE VISIT (OUTPATIENT)
Dept: ENT CLINIC | Age: 33
End: 2023-06-26
Payer: COMMERCIAL

## 2023-06-26 ENCOUNTER — OFFICE VISIT (OUTPATIENT)
Dept: ENT CLINIC | Age: 33
End: 2023-06-26
Payer: COMMERCIAL

## 2023-06-26 VITALS
DIASTOLIC BLOOD PRESSURE: 68 MMHG | BODY MASS INDEX: 22.49 KG/M2 | WEIGHT: 135 LBS | SYSTOLIC BLOOD PRESSURE: 118 MMHG | HEIGHT: 65 IN

## 2023-06-26 DIAGNOSIS — H60.93 RECURRENT OTITIS EXTERNA OF BOTH EARS: Primary | ICD-10-CM

## 2023-06-26 DIAGNOSIS — H69.83 DYSFUNCTION OF BOTH EUSTACHIAN TUBES: ICD-10-CM

## 2023-06-26 DIAGNOSIS — H69.82 DYSFUNCTION OF LEFT EUSTACHIAN TUBE: Primary | ICD-10-CM

## 2023-06-26 PROCEDURE — 92567 TYMPANOMETRY: CPT | Performed by: AUDIOLOGIST

## 2023-06-26 PROCEDURE — 99213 OFFICE O/P EST LOW 20 MIN: CPT | Performed by: OTOLARYNGOLOGY

## 2023-06-26 RX ORDER — ACETIC ACID 20.65 MG/ML
4 SOLUTION AURICULAR (OTIC) 2 TIMES DAILY
Qty: 15 ML | Refills: 2 | Status: SHIPPED | OUTPATIENT
Start: 2023-06-26

## 2023-06-26 ASSESSMENT — ENCOUNTER SYMPTOMS
ALLERGIC/IMMUNOLOGIC NEGATIVE: 1
EYES NEGATIVE: 1
GASTROINTESTINAL NEGATIVE: 1
RESPIRATORY NEGATIVE: 1

## 2023-07-05 ENCOUNTER — TELEPHONE (OUTPATIENT)
Dept: NEUROLOGY | Age: 33
End: 2023-07-05

## 2023-07-05 NOTE — TELEPHONE ENCOUNTER
Sherman Haywood ([employer])  Rx #: [id number]  Emgality 120MG/ML auto-injectors (migraine)     Form  MedImpact ePA Form 2017 NCPDP  Created  6 days ago  Sent to Plan  5 minutes ago  Plan Response  4 minutes ago  Submit Clinical Questions  less than a minute ago  Determination  Wait for Determination  Please wait for MedImpact 2017 to return a determination.

## 2023-07-06 ENCOUNTER — OFFICE VISIT (OUTPATIENT)
Dept: PRIMARY CARE CLINIC | Age: 33
End: 2023-07-06
Payer: COMMERCIAL

## 2023-07-06 VITALS
WEIGHT: 132.2 LBS | TEMPERATURE: 97.3 F | BODY MASS INDEX: 22.02 KG/M2 | DIASTOLIC BLOOD PRESSURE: 74 MMHG | SYSTOLIC BLOOD PRESSURE: 108 MMHG | HEART RATE: 90 BPM | HEIGHT: 65 IN | OXYGEN SATURATION: 98 %

## 2023-07-06 DIAGNOSIS — N94.6 DYSMENORRHEA: ICD-10-CM

## 2023-07-06 DIAGNOSIS — N92.0 MENORRHAGIA WITH REGULAR CYCLE: ICD-10-CM

## 2023-07-06 DIAGNOSIS — M51.37 DDD (DEGENERATIVE DISC DISEASE), LUMBOSACRAL: Primary | ICD-10-CM

## 2023-07-06 PROCEDURE — 99213 OFFICE O/P EST LOW 20 MIN: CPT | Performed by: NURSE PRACTITIONER

## 2023-07-06 RX ORDER — TRANEXAMIC ACID 650 MG/1
1300 TABLET ORAL 3 TIMES DAILY
Qty: 30 TABLET | Refills: 5 | Status: SHIPPED | OUTPATIENT
Start: 2023-07-06

## 2023-07-06 ASSESSMENT — ENCOUNTER SYMPTOMS
VOMITING: 0
CHEST TIGHTNESS: 0
NAUSEA: 0
COLOR CHANGE: 0
SORE THROAT: 0
SHORTNESS OF BREATH: 0
DIARRHEA: 0
COUGH: 0
BACK PAIN: 1
ABDOMINAL PAIN: 0

## 2023-07-06 NOTE — ASSESSMENT & PLAN NOTE
Patient reports continued pain. She is currently scheduled with Pain management on 07/24. She is currently in the process of rescheduling the appointment due to a scheduling conflict. Patient reports that she has an hour drive tomorrow to take her children to the [employer]. She states that long car rides usually increase her pain. Recommended frequent stops and stretching to help with stiffness, rather than adding in additional medications.

## 2023-07-06 NOTE — PROGRESS NOTES
2023     Shashank Zhang (:  1990) is a 28 y.o. female,Established patient, here for evaluation of the following chief complaint(s):  Follow-up      ASSESSMENT/PLAN:  1. DDD (degenerative disc disease), lumbosacral  Assessment & Plan:   Patient reports continued pain. She is currently scheduled with Pain management on . She is currently in the process of rescheduling the appointment due to a scheduling conflict. Patient reports that she has an hour drive tomorrow to take her children to the [employer]. She states that long car rides usually increase her pain. Recommended frequent stops and stretching to help with stiffness, rather than adding in additional medications. Return in about 3 months (around 10/6/2023). SUBJECTIVE/OBJECTIVE:  HPI    Prior to Visit Medications    Medication Sig Taking? Authorizing Provider   tranexamic acid (LYSTEDA) 650 MG TABS tablet Take 2 tablets by mouth 3 times daily Yes CHRIS Moulton   Galcanezumab-gnlm (EMGALITY) 120 MG/ML SOAJ Inject 120 mg into the skin every 30 days Yes Laura Belcher MD   rizatriptan (MAXALT) 10 MG tablet Take 1 tablet by mouth once as needed for Migraine May repeat in 2 hours if needed Yes Laura Belcher MD   acetic acid (VOSOL) 2 % otic solution Place 4 drops into both ears in the morning and 4 drops in the evening. Yes Gray Garza MD   pseudoephedrine (DECONGESTANT) 30 MG tablet Take 1 tablet by mouth three times daily Yes Neeru Shepherd PA-C   clonazePAM (KLONOPIN) 0.5 MG tablet Take 1 tablet by mouth 2 times daily as needed for Anxiety for up to 30 days.  Yes Jayshree Louis MD   QUEtiapine (SEROQUEL) 100 MG tablet Take 1.5 tablets by mouth nightly  Patient taking differently: Take 2 tablets by mouth nightly Yes Jayshree Louis MD   topiramate (TOPAMAX) 100 MG tablet TAKE 1 TABLET BY MOUTH TWO TIMES A DAY Yes Laura Belcher MD   AIMOVIG 140 MG/ML SOAJ INJECT 140MG UNDER THE SKIN ONCE

## 2023-07-14 DIAGNOSIS — M51.37 DDD (DEGENERATIVE DISC DISEASE), LUMBOSACRAL: ICD-10-CM

## 2023-07-18 RX ORDER — GABAPENTIN 100 MG/1
100 CAPSULE ORAL 3 TIMES DAILY
Qty: 90 CAPSULE | Refills: 0 | Status: SHIPPED | OUTPATIENT
Start: 2023-07-18 | End: 2023-08-17

## 2023-07-20 PROBLEM — Z00.00 WELL ADULT EXAM: Status: RESOLVED | Noted: 2023-06-20 | Resolved: 2023-07-20

## 2023-07-31 ENCOUNTER — HOSPITAL ENCOUNTER (OUTPATIENT)
Dept: PAIN MANAGEMENT | Age: 33
Discharge: HOME | End: 2023-07-31
Payer: COMMERCIAL

## 2023-07-31 VITALS
TEMPERATURE: 98.3 F | SYSTOLIC BLOOD PRESSURE: 96 MMHG | RESPIRATION RATE: 16 BRPM | DIASTOLIC BLOOD PRESSURE: 70 MMHG | HEIGHT: 65 IN | BODY MASS INDEX: 22.16 KG/M2 | OXYGEN SATURATION: 100 % | HEART RATE: 61 BPM | WEIGHT: 133 LBS

## 2023-07-31 DIAGNOSIS — M51.37 DDD (DEGENERATIVE DISC DISEASE), LUMBOSACRAL: Primary | ICD-10-CM

## 2023-07-31 DIAGNOSIS — M53.3 SACROILIAC JOINT DYSFUNCTION OF BOTH SIDES: ICD-10-CM

## 2023-07-31 PROCEDURE — 99215 OFFICE O/P EST HI 40 MIN: CPT

## 2023-07-31 RX ORDER — QUETIAPINE FUMARATE 200 MG/1
1 TABLET, FILM COATED ORAL NIGHTLY
COMMUNITY
Start: 2023-06-08

## 2023-07-31 RX ORDER — NAPROXEN AND ESOMEPRAZOLE MAGNESIUM 20; 500 MG/1; MG/1
1 TABLET, DELAYED RELEASE ORAL 2 TIMES DAILY PRN
Qty: 60 TABLET | Refills: 2 | Status: SHIPPED | OUTPATIENT
Start: 2023-07-31 | End: 2023-10-29

## 2023-07-31 RX ORDER — IBUPROFEN AND FAMOTIDINE 26.6; 8 MG/1; MG/1
1 TABLET, FILM COATED ORAL 3 TIMES DAILY PRN
Qty: 90 TABLET | Refills: 2 | Status: SHIPPED | OUTPATIENT
Start: 2023-07-31 | End: 2023-07-31 | Stop reason: CLARIF

## 2023-07-31 ASSESSMENT — ENCOUNTER SYMPTOMS
BACK PAIN: 1
CONSTIPATION: 0

## 2023-07-31 ASSESSMENT — PAIN DESCRIPTION - LOCATION: LOCATION: BACK

## 2023-07-31 ASSESSMENT — PAIN SCALES - GENERAL: PAINLEVEL_OUTOF10: 7

## 2023-07-31 ASSESSMENT — PAIN DESCRIPTION - ORIENTATION: ORIENTATION: LOWER

## 2023-07-31 ASSESSMENT — PAIN DESCRIPTION - PAIN TYPE: TYPE: CHRONIC PAIN

## 2023-07-31 NOTE — H&P
[facility]    History and Physical    Patient Name: Angle Jefferson    MR #: [id number]    Account [de-identified]    : 1990    Age: 28 y.o. Sex: female    Date: ***    PCP: CHRIS Christianson CNP    Referring Provider: PCP    Chief Complaint:   Chief Complaint   Patient presents with    Back Pain     7/10       History of Present Illness: The patient is a 28 y.o. female who was referrred with primary complaints of chronic low back pain. Patient has pain that radiates down from the low back to the bilaterally more to right than to left posterior, and then lateral and stops hips Patient has more pain getting up from a seated position than the act of setting down Yes Patient has 10% of pain in the back and 90 % in the leg(s). Patient leans on shopping cart or in a chair to relieve low back pain Yes Patient has to use assistive devices due to pain No. Patient uses a  none      Pain worsens going up and downstairs? No Patient has to take one step at a time due to pain? No Patient is a side sleeper that tosses and turns at night due to pain? Yes Patient sleeps with a pillow between knees to help lessen pain? No Patient sleeps in an recliner or in a inclined position due to pain? No Patient has pain that radiates into the groin? No    Patient describes pain as aching, sharp, and throbbing    Does pain affect ADLs Patient works through the pain     Patient has made adjusts to ADL's such as sitting to do dishes, sitting to get dressed, using a shower chair for bathing, taking frequent rest periods when doing activities that affect pain. No    Does pain affect quality of life? Yes    Does pain affect activities of enjoyment? Yes    Have you been on pain medication for your pain? No    If so, does the pain medication keep your pain tolerable to perform the tasks that affect your pain?  No      Of Note: This is a work related injury No    Patient has been a CNA for

## 2023-07-31 NOTE — PROGRESS NOTES
Office agreement reviewed with patient and signed per patient. No questions voiced at the present and patient states understanding of agreement. Copy given to patient. Also gave patient a copy of office phone number options.

## 2023-07-31 NOTE — PROGRESS NOTES
Clinic Documentation      Education Provided:  [x] Review of Dayana Lean  [x] Agreement Review  [x] PEG Score Calculated [x] PHQ Score Calculated [x] ORT Score Calculated    [] Compliance Issues Discussed [] Cognitive Behavior Needs [x] Exercise [] Review of Test [] Financial Issues  [x] Tobacco/Alcohol Use Reviewed [x] Teaching [x] New Patient [x] Picture Obtained    Physician Plan:  [] Outgoing Referral  [] Pharmacy Consult  [x] Test Ordered [x] Prescription Ordered/Changed   [] Obtained Test Results / Consult Notes        Complete if patient is withholding blood thinner for procedure     Blood Thinner Patient is currently taking:      [] Plavix (Hold for 7 days)  [] Aspirin (Hold for 5 days)     [] Pletal (Hold for 2 days)  [] Pradaxa (Hold for 3 days)    [] Effient (Hold for 7 days)  [] Xarelto (Hold for 2 days)    [] Eliquis (Hold for 2 days)  [] Brilinta (Hold for 7 days)    [] Coumadin (Hold for 5 days) - (INR needs to be drawn the day prior to procedure- INR < 2.0)    [] Aggrenox (Hold for 7 days)        [] Patient will stop medication on their own.    [] Blood Thinner Form Faxed for approval to hold.    Provider form faxed to:     Assessment Completed by:  Electronically signed by Aleida Campos RN on 7/31/2023 at 11:37 AM

## 2023-07-31 NOTE — DISCHARGE INSTRUCTIONS
Treatment: The treatment goal of pain management is to find the cause behind the pain and treat the causes. To find the cause, a plan is developed which could included, but not limited to, diagnostic testing, imaging, physical therapy, possibly injections. Once the cause has been determined, a treatment plan is developed to treat the source of the pain. This could include, but not limited to, chiropractics, physical therapy, massage therapy, dry needling, medical equipment, cognitive behavioral therapy, injections, surgical referrals, and medications. The plan is to treat what is causing the pain with conservative alternate treatments. Masking the pain with opioids and other medications is not an effective treatment but a temporary fix. Opioids are not given to every patient in pain management. Actually, opioids are avoided if possible. If opioid medications were given, the goal is to use the least amount of opioids as possible while tailoring a more effective treatment plan to treat the cause of your pain which will help reduce and possibly eliminate your pain. If opioids were given the goal is to wean medication as soon as appropriate. If your pain is proven to be chronic pain that continuous around the clock pain medication is required, the use of abuse deterrent medication will be used as directed by the opioids drug task force. Exercise - Relying solely on medications and injections will not alleviate your pain or keep your pain at a tolerable level. Core strengthening coupled with a generalized exercise program has been proven to help function and reduce pain. Therefore, following a regular generalized exercise program is vital in helping to reduce your pain. Please follow these exercise instructions as given in your discharge instructions.      Thanh Wood, [...], and Juancarlos Viveros; (2012) A [place]-Analysis of Core Stability Exercise versus General Exercise for Chronic Low Back Pain; PloS

## 2023-08-01 ENCOUNTER — TELEPHONE (OUTPATIENT)
Dept: PAIN MANAGEMENT | Age: 33
End: 2023-08-01

## 2023-08-01 NOTE — TELEPHONE ENCOUNTER
This request has not been approved. Your request was reviewed by the health plan and based on the information submitted was not approved. Duexis and its associated generics are not covered by the health plan. Please consider prescribing the separate ingredients, ibuprofen and famotidine, to be taken together. Both ingredients are OTC and available without a prescription.

## 2023-08-02 ENCOUNTER — TELEPHONE (OUTPATIENT)
Dept: PAIN MANAGEMENT | Age: 33
End: 2023-08-02

## 2023-08-08 DIAGNOSIS — N94.6 DYSMENORRHEA: ICD-10-CM

## 2023-08-08 DIAGNOSIS — N92.0 MENORRHAGIA WITH REGULAR CYCLE: ICD-10-CM

## 2023-08-08 RX ORDER — TRANEXAMIC ACID 650 MG/1
1300 TABLET ORAL 3 TIMES DAILY
Qty: 30 TABLET | Refills: 5 | OUTPATIENT
Start: 2023-08-08

## 2023-08-14 NOTE — PROGRESS NOTES
Therapy Progress Note  Sanjuana Vasquez Portillo Briones 53  6/24/2019  8:21 AM      Time spent with Patient: 28 minutes  This is patient's second  Therapy appointment. Reason for Consult:  depression, anxiety and stress  Referring Provider: No referring provider defined for this encounter. Shellie Esteves ,a 26 y.o. female, for initial evaluation visit. Pt provided informed consent for the behavioral health program. Discussed with patient model of service to include the limits of confidentiality (i.e. abuse reporting, suicide intervention, etc.) and short-term intervention focused approach. Discussed no show and late cancellation policy. Pt indicated understanding. S:  Pt is quite talkative with therapist today discussing recent events such as the severe weather that's occurred as well as work stress. She is trying to get her 7 yo son in to see Arun Romero for testing as his therapist suspects he may have autism. Pt is having difficulty with his attitude and behavior at home. Will discuss positive steps to wellbeing further in next session. Denies SI, HI and AVH at this time.     MSE:    Appearance    alert, cooperative; casually dressed  Appetite fluctuates  Sleep disturbance Yes  Fatigue Yes  Loss of pleasure No  Impulsive behavior No  Speech    normal rate and normal volume  Mood    Anxious  Depressed  Affect    depressed affect  Thought Content    cognitive distortions  Thought Process    linear  Associations    logical connections  Insight    Good  Judgment    Intact  Orientation    oriented to person, place, time, and general circumstances  Memory    recent and remote memory intact  Attention/Concentration    intact  Morbid ideation No  Suicide Assessment    no suicidal ideation      History:  Social History     Socioeconomic History    Marital status:      Spouse name: Not on file    Number of children: Not on file    Years of education: Not on file    Highest education level: Not on file on file   Social Needs    Financial resource strain: Not on file    Food insecurity:     Worry: Not on file     Inability: Not on file    Transportation needs:     Medical: Not on file     Non-medical: Not on file   Tobacco Use    Smoking status: Current Every Day Smoker     Packs/day: 0.50     Years: 6.00     Pack years: 3.00     Types: Cigarettes    Smokeless tobacco: Never Used   Substance and Sexual Activity    Alcohol use: Yes     Comment: rarely    Drug use: No    Sexual activity: Not on file   Lifestyle    Physical activity:     Days per week: Not on file     Minutes per session: Not on file    Stress: Not on file   Relationships    Social connections:     Talks on phone: Not on file     Gets together: Not on file     Attends [employer] service: Not on file     Active member of club or organization: Not on file     Attends meetings of clubs or organizations: Not on file     Relationship status: Not on file    Intimate partner violence:     Fear of current or ex partner: Not on file     Emotionally abused: Not on file     Physically abused: Not on file     Forced sexual activity: Not on file   Other Topics Concern    Not on file   Social History Narrative    Not on file       TOBACCO:   reports that she has been smoking cigarettes. She has a 3.00 pack-year smoking history. She has never used smokeless tobacco.  ETOH:   reports that she drinks alcohol. Family History:   Family History   Problem Relation Age of Onset    Diabetes Maternal Aunt     Heart Failure Maternal Grandmother     Diabetes Paternal Grandfather     High Blood Pressure Paternal Grandfather        Diagnosis:    MDD      Diagnosis Date    Anxiety     Headache(784.0)     Headache, paroxysmal hemicrania, episodic     Migraine     Tension headache      Problems related to the social environment, Economic problems and Other psychosocial and environmental problems    Plan:  1. Continue medication management  2.  CBT to target depression/anxiety  3.  Discuss positive steps to wellbeing    Pt interventions:  Provided education, Discussed self-care (sleep, nutrition, rewarding activities, social support, exercise), Discussed use of imagery, distractions, relaxation, mood management, communication training, questioning unhelpful thinking, problem-solving, and behavioral activation to manage pain, Established rapport, Supportive techniques, Emphasized self-care as important for managing overall health, CBT to target depression and anxiety and Identified maladaptive thoughts      [address] right normal/left normal

## 2023-08-17 ENCOUNTER — TELEPHONE (OUTPATIENT)
Dept: PAIN MANAGEMENT | Age: 33
End: 2023-08-17

## 2023-08-17 NOTE — TELEPHONE ENCOUNTER
Patient left message on nurse line regarding denial of Duexis and Vimovo scripts. She is asking if there is an alternative that the provider would want to try. Routing to [place] at this time.

## 2023-08-21 DIAGNOSIS — M53.3 SACROILIAC JOINT DYSFUNCTION OF BOTH SIDES: ICD-10-CM

## 2023-08-21 DIAGNOSIS — M51.37 DDD (DEGENERATIVE DISC DISEASE), LUMBOSACRAL: ICD-10-CM

## 2023-08-21 DIAGNOSIS — M26.643 ARTHRITIS OF BOTH TEMPOROMANDIBULAR JOINTS: Primary | ICD-10-CM

## 2023-08-21 RX ORDER — NAPROXEN 500 MG/1
500 TABLET ORAL 2 TIMES DAILY PRN
Qty: 60 TABLET | Refills: 2 | Status: SHIPPED | OUTPATIENT
Start: 2023-08-21 | End: 2023-11-19

## 2023-09-06 DIAGNOSIS — M51.37 DDD (DEGENERATIVE DISC DISEASE), LUMBOSACRAL: ICD-10-CM

## 2023-09-06 DIAGNOSIS — N92.0 MENORRHAGIA WITH REGULAR CYCLE: ICD-10-CM

## 2023-09-06 DIAGNOSIS — N94.6 DYSMENORRHEA: ICD-10-CM

## 2023-09-06 RX ORDER — TRANEXAMIC ACID 650 MG/1
1300 TABLET ORAL 3 TIMES DAILY
Qty: 30 TABLET | Refills: 5 | Status: SHIPPED | OUTPATIENT
Start: 2023-09-06

## 2023-09-07 RX ORDER — GABAPENTIN 100 MG/1
100 CAPSULE ORAL 3 TIMES DAILY
Qty: 90 CAPSULE | Refills: 0 | Status: SHIPPED | OUTPATIENT
Start: 2023-09-07 | End: 2023-10-07

## 2023-09-07 RX ORDER — GABAPENTIN 100 MG/1
CAPSULE ORAL
Qty: 90 CAPSULE | Refills: 0 | OUTPATIENT
Start: 2023-09-07

## 2023-09-07 NOTE — TELEPHONE ENCOUNTER
Robb Remberto called to request a refill on her medication. Last office visit : 7/6/2023   Next office visit : 9/6/2023     Last UDS:   Amphetamine Screen, Urine   Date Value Ref Range Status   06/13/2023 neg  Final     Barbiturate Screen, Urine   Date Value Ref Range Status   06/13/2023 neg  Final     Benzodiazepine Screen, Urine   Date Value Ref Range Status   06/13/2023 neg  Final     Buprenorphine Urine   Date Value Ref Range Status   06/13/2023 neg  Final     Cocaine Metabolite Screen, Urine   Date Value Ref Range Status   06/13/2023 neg  Final     Gabapentin Screen, Urine   Date Value Ref Range Status   06/13/2023 neg  Final     MDMA, Urine   Date Value Ref Range Status   06/13/2023 neg  Final     Methamphetamine, Urine   Date Value Ref Range Status   06/13/2023 neg  Final     Opiate Scrn, Ur   Date Value Ref Range Status   06/13/2023 neg  Final     Oxycodone Screen, Ur   Date Value Ref Range Status   06/13/2023 neg  Final     PCP Screen, Urine   Date Value Ref Range Status   06/13/2023 neg  Final     Propoxyphene Screen, Urine   Date Value Ref Range Status   06/13/2023 neg  Final     THC Screen, Urine   Date Value Ref Range Status   06/13/2023 neg  Final     Tricyclic Antidepressants, Urine   Date Value Ref Range Status   06/13/2023 pos  Final       Last Lattie Barbone: 9/7/23  Medication Contract: 3/8/23   Last Fill: 7/18/23      Requested Prescriptions     Pending Prescriptions Disp Refills    gabapentin (NEURONTIN) 100 MG capsule 90 capsule 0     Sig: Take 1 capsule by mouth 3 times daily for 30 days. Max Daily Amount: 300 mg         Please approve or refuse this medication.    Bari Ortega

## 2023-09-21 ENCOUNTER — TELEPHONE (OUTPATIENT)
Dept: PAIN MANAGEMENT | Age: 33
End: 2023-09-21

## 2023-09-21 ENCOUNTER — HOSPITAL ENCOUNTER (OUTPATIENT)
Dept: GENERAL RADIOLOGY | Age: 33
Discharge: HOME | End: 2023-09-21
Payer: COMMERCIAL

## 2023-09-21 DIAGNOSIS — M53.3 SACROILIAC JOINT DYSFUNCTION OF BOTH SIDES: ICD-10-CM

## 2023-09-21 PROCEDURE — 73521 X-RAY EXAM HIPS BI 2 VIEWS: CPT

## 2023-09-21 NOTE — TELEPHONE ENCOUNTER
Patient left message on nurse line this morning prior to office hours. She is having an xray done today related to her upcoming office visit. She stated that last month she fell and has been having increased pain to her upper lumbar, lower thoracic region. She states that this morning she felt something in that area \"pop\". She said that she had to sit down and the pain was so severe she was crying. She is asking if Madie James would like to amend the xray to include that area also. Call received by this writer, however patient had already gotten xray. I called her back and let her know that I am making Madie James aware to see if she would like to order another xray to look at that area. I will call patient back with any further instructions. Patient verbalized understanding.

## 2023-09-26 ENCOUNTER — HOSPITAL ENCOUNTER (OUTPATIENT)
Dept: PAIN MANAGEMENT | Age: 33
Discharge: HOME | End: 2023-09-26
Payer: COMMERCIAL

## 2023-09-26 VITALS
RESPIRATION RATE: 16 BRPM | BODY MASS INDEX: 22.66 KG/M2 | TEMPERATURE: 98.1 F | SYSTOLIC BLOOD PRESSURE: 99 MMHG | OXYGEN SATURATION: 94 % | DIASTOLIC BLOOD PRESSURE: 66 MMHG | HEIGHT: 65 IN | WEIGHT: 136 LBS | HEART RATE: 82 BPM

## 2023-09-26 DIAGNOSIS — R55 SYNCOPE AND COLLAPSE: ICD-10-CM

## 2023-09-26 DIAGNOSIS — M53.3 SACROILIAC JOINT DYSFUNCTION OF BOTH SIDES: ICD-10-CM
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
## 2023-09-26 DIAGNOSIS — F11.90 OPIOID USE: Primary | ICD-10-CM

## 2023-09-26 DIAGNOSIS — M51.37 DDD (DEGENERATIVE DISC DISEASE), LUMBOSACRAL: ICD-10-CM

## 2023-09-26 PROCEDURE — 99214 OFFICE O/P EST MOD 30 MIN: CPT

## 2023-09-26 RX ORDER — TRAMADOL HYDROCHLORIDE 50 MG/1
50 TABLET ORAL EVERY 8 HOURS PRN
Qty: 90 TABLET | Refills: 2 | Status: SHIPPED | OUTPATIENT
Start: 2023-09-26 | End: 2023-12-25

## 2023-09-26 RX ORDER — NALOXONE HYDROCHLORIDE 4 MG/.1ML
1 SPRAY NASAL PRN
Qty: 2 EACH | Refills: 5 | Status: SHIPPED | OUTPATIENT
Start: 2023-09-26

## 2023-09-26 RX ORDER — QUETIAPINE FUMARATE 50 MG/1
TABLET, FILM COATED ORAL
COMMUNITY
Start: 2023-08-28

## 2023-09-26 ASSESSMENT — PAIN DESCRIPTION - PAIN TYPE: TYPE: CHRONIC PAIN

## 2023-09-26 ASSESSMENT — PAIN DESCRIPTION - ORIENTATION: ORIENTATION: LOWER

## 2023-09-26 ASSESSMENT — PAIN DESCRIPTION - LOCATION: LOCATION: BACK

## 2023-09-26 ASSESSMENT — ENCOUNTER SYMPTOMS: CONSTIPATION: 0

## 2023-09-26 ASSESSMENT — PAIN SCALES - GENERAL: PAINLEVEL_OUTOF10: 7

## 2023-09-26 NOTE — PROGRESS NOTES
Clinic Documentation      Education Provided:  [x] Review of Charis Cleverly  [x] Agreement Review  [x] PEG Score Calculated [] PHQ Score Calculated [] ORT Score Calculated    [] Compliance Issues Discussed [] Cognitive Behavior Needs [x] Exercise [] Review of Test [] Financial Issues  [x] Tobacco/Alcohol Use Reviewed [x] Teaching [] New Patient [] Picture Obtained    Physician Plan:  [] Outgoing Referral  [] Pharmacy Consult  [x] Test Ordered [x] Prescription Ordered/Changed   [] Obtained Test Results / Consult Notes        Complete if patient is withholding blood thinner for procedure     Blood Thinner Patient is currently taking:      [] Plavix (Hold for 7 days)  [] Aspirin (Hold for 5 days)     [] Pletal (Hold for 2 days)  [] Pradaxa (Hold for 3 days)    [] Effient (Hold for 7 days)  [] Xarelto (Hold for 2 days)    [] Eliquis (Hold for 2 days)  [] Brilinta (Hold for 7 days)    [] Coumadin (Hold for 5 days) - (INR needs to be drawn the day prior to procedure- INR < 2.0)    [] Aggrenox (Hold for 7 days)        [] Patient will stop medication on their own.    [] Blood Thinner Form Faxed for approval to hold.    Provider form faxed to:     Assessment Completed by:  Electronically signed by Landon Brittle, RN on 9/26/2023 at 11:42 AM

## 2023-09-26 NOTE — TELEPHONE ENCOUNTER
Requested Prescriptions     Pending Prescriptions Disp Refills    topiramate (TOPAMAX) 100 MG tablet 180 tablet 3     Sig: TAKE 1 TABLET BY MOUTH TWO TIMES A DAY       Last Office Visit: 6/29/2023  Next Office Visit: 12/28/2023  Last Medication Refill: 10/27/22 with 3 REESE

## 2023-09-26 NOTE — PROGRESS NOTES
[facility] Physical & Pain Medicine    Office Visit    Patient Name: Marge Marques    MR #: [id number]    Account [de-identified]    : 1990    Age: 28 y.o. Sex: female    Date: 2023    PCP: Metta Schirmer, APRN - CNP    Chief Complaint:   Chief Complaint   Patient presents with    Back Pain     7/10       History of Present Illness: The patient is a 28 y.o. female who presents for follow up. Patient had x-rays of her low back    On , Patient was at the [facility] and she got lightheaded. Patient woke up lying in the floor looking up at her boyfriend. Patient does not know how she landed but her head and back was hurting. Patient did not go to ED following the event. Patient has had some emdication adjusted and she has not had an episode since. Since the fall the patient has had a constant thoracic and lumbar pain. Last week patient getting oil changed she stretched and her a popping sound in her back. She had immedicate pain (10) shoot up her back. The pain has been stronger every since. Right leg has been going numb since the pop. The numbness is random. It can happen with sitting, standing, walking etc.     Back Pain  This is a chronic problem. The current episode started more than 1 year ago. The problem occurs constantly. The problem has been gradually worsening since onset. The pain is present in the sacro-iliac and lumbar spine. The quality of the pain is described as aching (sharp throbbing). Radiates to: bilaterally more to right than to left posterior, and then lateral and stops hips. The pain is at a severity of 7/10. The symptoms are aggravated by standing, bending and position. Associated symptoms include leg pain. Pertinent negatives include no numbness or weakness. Past Pain Management History  No PM history    Past Imaging  Thoracic Spine  2021  IMPRESSION:  No acute bony abnormality. A mild dextroscoliosis.     X-ray

## 2023-09-27 RX ORDER — TOPIRAMATE 100 MG/1
TABLET, FILM COATED ORAL
Qty: 180 TABLET | Refills: 3 | Status: SHIPPED | OUTPATIENT
Start: 2023-09-27

## 2023-10-05 ENCOUNTER — OFFICE VISIT (OUTPATIENT)
Dept: PRIMARY CARE CLINIC | Age: 33
End: 2023-10-05
Payer: COMMERCIAL

## 2023-10-05 VITALS
DIASTOLIC BLOOD PRESSURE: 58 MMHG | TEMPERATURE: 96.9 F | WEIGHT: 133.8 LBS | SYSTOLIC BLOOD PRESSURE: 110 MMHG | HEIGHT: 65 IN | HEART RATE: 81 BPM | BODY MASS INDEX: 22.29 KG/M2 | OXYGEN SATURATION: 98 %

## 2023-10-05 DIAGNOSIS — R55 SYNCOPE AND COLLAPSE: ICD-10-CM

## 2023-10-05 DIAGNOSIS — R41.840 ATTENTION DEFICIT: ICD-10-CM

## 2023-10-05 DIAGNOSIS — R79.89 LOW VITAMIN D LEVEL: ICD-10-CM

## 2023-10-05 DIAGNOSIS — L30.9 DERMATITIS: Primary | ICD-10-CM

## 2023-10-05 PROCEDURE — 96372 THER/PROPH/DIAG INJ SC/IM: CPT | Performed by: NURSE PRACTITIONER

## 2023-10-05 PROCEDURE — 99214 OFFICE O/P EST MOD 30 MIN: CPT | Performed by: NURSE PRACTITIONER

## 2023-10-05 RX ORDER — DEXAMETHASONE SODIUM PHOSPHATE 10 MG/ML
10 INJECTION INTRAMUSCULAR; INTRAVENOUS ONCE
Status: COMPLETED | OUTPATIENT
Start: 2023-10-05 | End: 2023-10-05

## 2023-10-05 RX ADMIN — DEXAMETHASONE SODIUM PHOSPHATE 10 MG: 10 INJECTION INTRAMUSCULAR; INTRAVENOUS at 10:29

## 2023-10-05 NOTE — ASSESSMENT & PLAN NOTE
Patient's vitamin D was 24.7 on 06/15. She has not been taking supplementation, as she states she forgot. Encouraged her to start, and will repeat at next follow up.

## 2023-10-05 NOTE — ASSESSMENT & PLAN NOTE
Patient reports that she passed out on 08/20, when she went to visit [facility]. She did not seek medical attention, but feels as if it may be related to her blood pressure. Explained this could be the cause, as he blood pressures tend to run on the lower side of normal, also her pain medications could be contributing. Recommend increasing hydration. Cautious when taking pain medications in changing positions slowly. She had just increased her Seroquel, but has since reduced it back down.

## 2023-10-05 NOTE — ASSESSMENT & PLAN NOTE
Patient reports concern for decreased attention. She sees Dr. Jesse Rojas for psychiatry. , ecommended that she discuss further evaluation at her next follow up.

## 2023-10-05 NOTE — PROGRESS NOTES
After obtaining consent, and per orders of Lucian PEREZ , injection of Dexamethasone 10mg   given in right dorsogluteal by Claudia Walker MA. Patient instructed to remain in clinic for 20 minutes afterwards, and to report any adverse reaction to me immediately.

## 2023-10-05 NOTE — ASSESSMENT & PLAN NOTE
Patient reports rash on left upper extremity, and itching on trunk. She admittingly changed laundry detergents. Advised that she go back the the one she used previously. Treat today with a steroid injection today. Patient does not tolerate Benadryl. Advised that she call back if rash persists.

## 2023-10-13 ASSESSMENT — ENCOUNTER SYMPTOMS
DIARRHEA: 0
NAUSEA: 0
SHORTNESS OF BREATH: 0
VOMITING: 0
SORE THROAT: 0
COUGH: 0
COLOR CHANGE: 0
ABDOMINAL PAIN: 0
CHEST TIGHTNESS: 0

## 2023-10-13 NOTE — PROGRESS NOTES
10/5/2023     Zeinab Meadows (:  1990) is a 28 y.o. female,Established patient, here for evaluation of the following chief complaint(s):  3 Month Follow-Up and Discuss Medications (tramadol)      ASSESSMENT/PLAN:  1. Dermatitis  Assessment & Plan:  Patient reports rash on left upper extremity, and itching on trunk. She admittingly changed laundry detergents. Advised that she go back the the one she used previously. Treat today with a steroid injection today. Patient does not tolerate Benadryl. Advised that she call back if rash persists. Orders:  -     dexamethasone (DECADRON) injection 10 mg; 10 mg, IntraMUSCular, ONCE, 1 dose, On Thu 10/5/23 at 1030  2. Low vitamin D level  Assessment & Plan:  Patient's vitamin D was 24.7 on 06/15. She has not been taking supplementation, as she states she forgot. Encouraged her to start, and will repeat at next follow up. 3. Syncope and collapse  Assessment & Plan:  Patient reports that she passed out on , when she went to visit [facility]. She did not seek medical attention, but feels as if it may be related to her blood pressure. Explained this could be the cause, as he blood pressures tend to run on the lower side of normal, also her pain medications could be contributing. Recommend increasing hydration. Cautious when taking pain medications in changing positions slowly. She had just increased her Seroquel, but has since reduced it back down. 4. Attention deficit  Assessment & Plan:  Patient reports concern for decreased attention. She sees Dr. Toshia Sheikh for psychiatry. , ecommended that she discuss further evaluation at her next follow up. Return in about 4 months (around 2024). SUBJECTIVE/OBJECTIVE:  HPI    Prior to Visit Medications    Medication Sig Taking?  Authorizing Provider   topiramate (TOPAMAX) 100 MG tablet TAKE 1 TABLET BY MOUTH TWO TIMES A DAY Yes Oscar Romano MD   QUEtiapine (SEROQUEL) 50 MG tablet  Yes

## 2023-10-23 ASSESSMENT — ENCOUNTER SYMPTOMS: BACK PAIN: 1
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
## 2023-11-01 DIAGNOSIS — M51.37 DDD (DEGENERATIVE DISC DISEASE), LUMBOSACRAL: ICD-10-CM

## 2023-11-01 RX ORDER — GABAPENTIN 100 MG/1
100 CAPSULE ORAL 3 TIMES DAILY
Qty: 90 CAPSULE | Refills: 0 | Status: SHIPPED | OUTPATIENT
Start: 2023-11-01 | End: 2023-12-01

## 2023-11-01 NOTE — TELEPHONE ENCOUNTER
Anitha Early called to request a refill on her medication. Last office visit : 10/5/2023   Next office visit : 2/6/2024     Last UDS:   Amphetamine Screen, Urine   Date Value Ref Range Status   06/13/2023 neg  Final     Barbiturate Screen, Urine   Date Value Ref Range Status   06/13/2023 neg  Final     Benzodiazepine Screen, Urine   Date Value Ref Range Status   06/13/2023 neg  Final     Buprenorphine Urine   Date Value Ref Range Status   06/13/2023 neg  Final     Cocaine Metabolite Screen, Urine   Date Value Ref Range Status   06/13/2023 neg  Final     Gabapentin Screen, Urine   Date Value Ref Range Status   06/13/2023 neg  Final     MDMA, Urine   Date Value Ref Range Status   06/13/2023 neg  Final     Methamphetamine, Urine   Date Value Ref Range Status   06/13/2023 neg  Final     Opiate Scrn, Ur   Date Value Ref Range Status   06/13/2023 neg  Final     Oxycodone Screen, Ur   Date Value Ref Range Status   06/13/2023 neg  Final     PCP Screen, Urine   Date Value Ref Range Status   06/13/2023 neg  Final     Propoxyphene Screen, Urine   Date Value Ref Range Status   06/13/2023 neg  Final     THC Screen, Urine   Date Value Ref Range Status   06/13/2023 neg  Final     Tricyclic Antidepressants, Urine   Date Value Ref Range Status   06/13/2023 pos  Final       Last Charis Cleverly: 9/7/23  Medication Contract: 6/13/23   Last Fill: 9/7/23    Requested Prescriptions     Pending Prescriptions Disp Refills    gabapentin (NEURONTIN) 100 MG capsule [Pharmacy Med Name: GABAPENTIN 100MG CAPS] 90 capsule 0     Sig: Take 1 capsule by mouth in the morning, at noon, and at bedtime. Please approve or refuse this medication.    Prabhu Lambert LPN

## 2023-11-20 ENCOUNTER — HOSPITAL ENCOUNTER (OUTPATIENT)
Dept: GENERAL RADIOLOGY | Age: 33
Discharge: HOME | End: 2023-11-20
Payer: COMMERCIAL

## 2023-11-20 DIAGNOSIS — M51.37 DDD (DEGENERATIVE DISC DISEASE), LUMBOSACRAL: ICD-10-CM

## 2023-11-20 DIAGNOSIS — M53.3 SACROILIAC JOINT DYSFUNCTION OF BOTH SIDES: ICD-10-CM

## 2023-11-20 PROCEDURE — 72100 X-RAY EXAM L-S SPINE 2/3 VWS: CPT

## 2023-11-20 PROCEDURE — 72072 X-RAY EXAM THORAC SPINE 3VWS: CPT

## 2023-11-21 ENCOUNTER — TELEPHONE (OUTPATIENT)
Dept: PAIN MANAGEMENT | Age: 33
End: 2023-11-21

## 2023-11-22 NOTE — TELEPHONE ENCOUNTER
Patient had multiple levels of degenerative disc disease and facet arthritis in thoracic and lumbar spine, and degeneration in her SI joints. Please notify the patient will discuss more at next appointment.  Electronically signed by CHRIS Ritter CNP on 11/21/2023 at 8:28 PM

## 2023-11-28 ENCOUNTER — HOSPITAL ENCOUNTER (OUTPATIENT)
Dept: PAIN MANAGEMENT | Age: 33
Discharge: HOME | End: 2023-11-28
Payer: COMMERCIAL

## 2023-11-28 VITALS
DIASTOLIC BLOOD PRESSURE: 68 MMHG | HEIGHT: 65 IN | TEMPERATURE: 98 F | BODY MASS INDEX: 21.66 KG/M2 | RESPIRATION RATE: 16 BRPM | OXYGEN SATURATION: 96 % | HEART RATE: 73 BPM | SYSTOLIC BLOOD PRESSURE: 97 MMHG | WEIGHT: 130 LBS

## 2023-11-28 PROCEDURE — 99213 OFFICE O/P EST LOW 20 MIN: CPT

## 2023-11-28 ASSESSMENT — PAIN DESCRIPTION - PAIN TYPE: TYPE: CHRONIC PAIN

## 2023-11-28 ASSESSMENT — PAIN DESCRIPTION - LOCATION: LOCATION: BACK

## 2023-11-28 ASSESSMENT — ENCOUNTER SYMPTOMS
BACK PAIN: 1
CONSTIPATION: 0

## 2023-11-28 ASSESSMENT — PAIN SCALES - GENERAL: PAINLEVEL_OUTOF10: 5

## 2023-11-28 ASSESSMENT — PAIN DESCRIPTION - ORIENTATION: ORIENTATION: LOWER

## 2023-11-28 NOTE — PROGRESS NOTES
Clinic Documentation      Education Provided:  [x] Review of Shellia Spore  [] Agreement Review  [x] PEG Score Calculated [] PHQ Score Calculated [] ORT Score Calculated    [] Compliance Issues Discussed [] Cognitive Behavior Needs [x] Exercise [] Review of Test [] Financial Issues  [x] Tobacco/Alcohol Use Reviewed [x] Teaching [] New Patient [] Picture Obtained    Physician Plan:  [] Outgoing Referral  [] Pharmacy Consult  [] Test Ordered [] Prescription Ordered/Changed   [] Obtained Test Results / Consult Notes        Complete if patient is withholding blood thinner for procedure     Blood Thinner Patient is currently taking:      [] Plavix (Hold for 7 days)  [] Aspirin (Hold for 5 days)     [] Pletal (Hold for 2 days)  [] Pradaxa (Hold for 3 days)    [] Effient (Hold for 7 days)  [] Xarelto (Hold for 2 days)    [] Eliquis (Hold for 2 days)  [] Brilinta (Hold for 7 days)    [] Coumadin (Hold for 5 days) - (INR needs to be drawn the day prior to procedure- INR < 2.0)    [] Aggrenox (Hold for 7 days)        [] Patient will stop medication on their own.    [] Blood Thinner Form Faxed for approval to hold.    Provider form faxed to:     Assessment Completed by:  Electronically signed by Flaquito So RN on 11/28/2023 at 12:12 PM
2    4. DDD (degenerative disc disease), lumbosacral  - traMADol (ULTRAM) 50 MG tablet; Take 1 tablet by mouth every 8 hours as needed for Pain for up to 90 days. 30 day supply per fill. Max Daily Amount: 150 mg  Dispense: 90 tablet; Refill: 2  - XR THORACIC SPINE (3 VIEWS); Future  - XR LUMBAR SPINE (2-3 VIEWS); Future    5. Sacroiliac joint dysfunction of both sides  - XR THORACIC SPINE (3 VIEWS); Future  - XR LUMBAR SPINE (2-3 VIEWS); Future     Pain Medication Agreement Signed  Agreement reviewed and signed 9/26/2023  Patient or Patient's Advocate verbalized understanding of agreement     Will obtain xray of thoracic and lumbar spine to determine cause of patient's pain. Will start low dose pain medication while evaluation of pain and treatment plan is being developed. [x] Follow up    [] 4 weeks   [] 6 weeks   [x] 8 weeks   [] 10 weeks   [] 3 months  [] Post procedure to evaluate effectiveness of treatment  [x] To evaluate medications changes made at office visit. [x] To review diagnostics ordered at last visit. [] To evaluate response to therapy    [] For management of controlled substance  [] Random UDS as indicated by ORT score or if indicated by abberent behaviors     Discussion: Discussed exam findings, reviewed imaging yes - , and gave education regarding pathophysiology, exercise, and treatment options yes - , and reviewed plan of care with patient. Strongly reinforced that exercise is exteremly important part of pain management. Exercises should be completed upon awaking and before bed. Patient agreed with POC and questions were asked and answered. See DC instructions. Activity: discussed exercise as beneficial to pain reduction, encouraged stretching exercise at least twice daily with a focus on torso (core) strengthening.     Goal:  Pain Management Goals of Therapy:   [] Resolution in pain  [x] Decrease in pain level  [x] Improvement in ADL's  [x] Increase in activities with less

## 2023-12-29 DIAGNOSIS — M51.37 DDD (DEGENERATIVE DISC DISEASE), LUMBOSACRAL: ICD-10-CM

## 2023-12-29 NOTE — TELEPHONE ENCOUNTER
Kesha SHANNAN Loyd called to request a refill on her medication.      Last office visit : 10/5/2023   Next office visit : 2/6/2024     Last UDS:   Amphetamine Screen, Urine   Date Value Ref Range Status   06/13/2023 neg  Final     Barbiturate Screen, Urine   Date Value Ref Range Status   06/13/2023 neg  Final     Benzodiazepine Screen, Urine   Date Value Ref Range Status   06/13/2023 neg  Final     Buprenorphine Urine   Date Value Ref Range Status   06/13/2023 neg  Final     Cocaine Metabolite Screen, Urine   Date Value Ref Range Status   06/13/2023 neg  Final     Gabapentin Screen, Urine   Date Value Ref Range Status   06/13/2023 neg  Final     MDMA, Urine   Date Value Ref Range Status   06/13/2023 neg  Final     Methamphetamine, Urine   Date Value Ref Range Status   06/13/2023 neg  Final     Opiate Scrn, Ur   Date Value Ref Range Status   06/13/2023 neg  Final     Oxycodone Screen, Ur   Date Value Ref Range Status   06/13/2023 neg  Final     PCP Screen, Urine   Date Value Ref Range Status   06/13/2023 neg  Final     Propoxyphene Screen, Urine   Date Value Ref Range Status   06/13/2023 neg  Final     THC Screen, Urine   Date Value Ref Range Status   06/13/2023 neg  Final     Tricyclic Antidepressants, Urine   Date Value Ref Range Status   06/13/2023 pos  Final       Last Jose: 12/29/23  Medication Contract: 6/13/23   Last Fill: 11/1/23    Requested Prescriptions     Pending Prescriptions Disp Refills    gabapentin (NEURONTIN) 100 MG capsule [Pharmacy Med Name: GABAPENTIN 100MG CAPS] 90 capsule 0     Sig: TAKE ONE CAPSULE IN THE MORNING, AT NOON, AND AT BEDTIME                                           Please approve or refuse this medication.   Ashanti Hammonds LPN

## 2024-01-01 RX ORDER — GABAPENTIN 100 MG/1
CAPSULE ORAL
Qty: 90 CAPSULE | Refills: 0 | Status: SHIPPED | OUTPATIENT
Start: 2024-01-01 | End: 2024-01-31

## 2024-01-12 ENCOUNTER — TELEPHONE (OUTPATIENT)
Dept: NEUROLOGY | Age: 34
End: 2024-01-12

## 2024-01-12 NOTE — TELEPHONE ENCOUNTER
Patient is needing a PA on her [employer] but we need a copy of her new insurance card. A message has been sent to the patient requesting a copy of her new card so we can get the PA completed.

## 2024-01-19 RX ORDER — TOPIRAMATE 100 MG/1
TABLET, FILM COATED ORAL
Qty: 180 TABLET | Refills: 3 | Status: SHIPPED | OUTPATIENT
Start: 2024-01-19

## 2024-01-19 NOTE — TELEPHONE ENCOUNTER
Requested Prescriptions     Pending Prescriptions Disp Refills    topiramate (TOPAMAX) 100 MG tablet [Pharmacy Med Name: TOPIRAMATE 100MG TABS] 180 tablet 3     Sig: TAKE ONE TABLET BY MOUTH 2 TIMES A DAY       Last Office Visit: 6/29/2023  Next Office Visit: 4/8/2024  Last Medication Refill:  9/27/23